# Patient Record
Sex: FEMALE | Race: WHITE | NOT HISPANIC OR LATINO | Employment: OTHER | URBAN - METROPOLITAN AREA
[De-identification: names, ages, dates, MRNs, and addresses within clinical notes are randomized per-mention and may not be internally consistent; named-entity substitution may affect disease eponyms.]

---

## 2017-01-04 ENCOUNTER — LAB (OUTPATIENT)
Dept: FAMILY MEDICINE CLINIC | Facility: CLINIC | Age: 57
End: 2017-01-04

## 2017-01-04 DIAGNOSIS — E83.52 HYPERCALCEMIA: ICD-10-CM

## 2017-01-04 DIAGNOSIS — E87.6 HYPOKALEMIA: ICD-10-CM

## 2017-01-04 DIAGNOSIS — N28.9 ABNORMAL KIDNEY FUNCTION: ICD-10-CM

## 2017-01-04 LAB
ANION GAP SERPL CALCULATED.3IONS-SCNC: 15.7 MMOL/L
BUN BLD-MCNC: 10 MG/DL (ref 6–20)
BUN/CREAT SERPL: 8.8 (ref 7–25)
CA-I BLD-MCNC: 5.2 MG/DL (ref 4.6–5.4)
CA-I SERPL ISE-MCNC: 1.29 MMOL/L (ref 1.1–1.35)
CALCIUM SPEC-SCNC: 10.6 MG/DL (ref 8.6–10.5)
CHLORIDE SERPL-SCNC: 92 MMOL/L (ref 98–107)
CO2 SERPL-SCNC: 34.3 MMOL/L (ref 22–29)
CREAT BLD-MCNC: 1.14 MG/DL (ref 0.57–1)
GFR SERPL CREATININE-BSD FRML MDRD: 49 ML/MIN/1.73
GLUCOSE BLD-MCNC: 122 MG/DL (ref 65–99)
POTASSIUM BLD-SCNC: 3.4 MMOL/L (ref 3.5–5.2)
PTH-INTACT SERPL-MCNC: 46.9 PG/ML (ref 15–65)
SODIUM BLD-SCNC: 142 MMOL/L (ref 136–145)
TSH SERPL DL<=0.05 MIU/L-ACNC: 3.33 MIU/ML (ref 0.27–4.2)

## 2017-01-04 PROCEDURE — 84443 ASSAY THYROID STIM HORMONE: CPT | Performed by: NURSE PRACTITIONER

## 2017-01-04 PROCEDURE — 82330 ASSAY OF CALCIUM: CPT | Performed by: NURSE PRACTITIONER

## 2017-01-04 PROCEDURE — 80048 BASIC METABOLIC PNL TOTAL CA: CPT | Performed by: NURSE PRACTITIONER

## 2017-01-04 PROCEDURE — 83970 ASSAY OF PARATHORMONE: CPT | Performed by: NURSE PRACTITIONER

## 2017-01-06 ENCOUNTER — TELEPHONE (OUTPATIENT)
Dept: FAMILY MEDICINE CLINIC | Facility: CLINIC | Age: 57
End: 2017-01-06

## 2017-01-06 DIAGNOSIS — R94.4 ABNORMAL RESULTS OF KIDNEY FUNCTION STUDIES: Primary | ICD-10-CM

## 2017-01-06 NOTE — TELEPHONE ENCOUNTER
----- Message from Dori Amado MA sent at 1/6/2017  9:30 AM EST -----  Contact: [phone]      ----- Message -----     From: Jessica Yen     Sent: 1/6/2017   9:01 AM       To: Dori Amado MA    PLEASE CALL PT WITH LAB RESULTS    Calcium elevated but further testing is negative for ionized calcium or parathyroid. We will monitor, no FU needed. BS slightly high 122 enc DM diet and regular exercise for BS control and weight loss. Kidney function is decreased again, recommend 24 hr urine collection to evaluate, suspect d/t dehydration from illness, increase H20 8 glasses day and RTC lab only recheck urine testing.

## 2017-01-12 ENCOUNTER — LAB (OUTPATIENT)
Dept: FAMILY MEDICINE CLINIC | Facility: CLINIC | Age: 57
End: 2017-01-12

## 2017-01-12 DIAGNOSIS — R94.4 NONSPECIFIC ABNORMAL RESULTS OF KIDNEY FUNCTION STUDY: Primary | ICD-10-CM

## 2017-01-12 DIAGNOSIS — R94.4 ABNORMAL RESULTS OF KIDNEY FUNCTION STUDIES: ICD-10-CM

## 2017-01-12 LAB
CREAT 24H UR-MCNC: 44.5 MG/DL
CREAT 24H UR-MRATE: 0.82 G/24 HR (ref 0.7–1.6)
CREAT CL 24H UR+SERPL-VRATE: 56 ML/MIN (ref 74.3–113.9)
CREAT SERPL-MCNC: 1.02 MG/DL (ref 0.57–1)
PROT 24H UR-MRATE: 74 MG/24HOURS (ref 0–150)
PROT UR-MCNC: 4 MG/DL

## 2017-01-17 ENCOUNTER — TELEPHONE (OUTPATIENT)
Dept: FAMILY MEDICINE CLINIC | Facility: CLINIC | Age: 57
End: 2017-01-17

## 2017-01-17 DIAGNOSIS — N28.9 FUNCTION KIDNEY DECREASED: Primary | ICD-10-CM

## 2017-01-17 NOTE — TELEPHONE ENCOUNTER
Kidney testing with urine shows some improvement but still decreased, need to FU with nephrology, referral made. Cont increase H20

## 2017-02-10 RX ORDER — TRIAMTERENE AND HYDROCHLOROTHIAZIDE 37.5; 25 MG/1; MG/1
TABLET ORAL
Qty: 30 TABLET | Refills: 4 | Status: SHIPPED | OUTPATIENT
Start: 2017-02-10 | End: 2017-04-17 | Stop reason: ALTCHOICE

## 2017-02-21 ENCOUNTER — TELEPHONE (OUTPATIENT)
Dept: FAMILY MEDICINE CLINIC | Facility: CLINIC | Age: 57
End: 2017-02-21

## 2017-02-21 NOTE — TELEPHONE ENCOUNTER
----- Message from JYOTI Disla sent at 2/21/2017 10:34 AM EST -----  Contact: TELE: [phone]   Did he recommend [facility] or [facility] or the hospital? He surely sends his patient for labs somewhere    ----- Message -----     From: Joel Vasquez MA     Sent: 2/21/2017   9:21 AM       To: JYOTI Disla        ----- Message -----     From: Nhung Greenwood     Sent: 2/21/2017   8:55 AM       To: ASPEN Archuleta DR THE NEPHROLOGIST  DOESN'T DO LABS AT THE OFFICE. WE NEED TO FIND A CLOSER PLACE TO HER THAT SHE CAN DO HER LABS.  WHAT SHOULD SHE DO?     Pt informed to call her nephrologist or insurance company to see if she can use [facility] or [facility]

## 2017-02-22 RX ORDER — LEVOTHYROXINE SODIUM 0.05 MG/1
TABLET ORAL
Qty: 90 TABLET | Refills: 2 | Status: SHIPPED | OUTPATIENT
Start: 2017-02-22 | End: 2017-11-21 | Stop reason: SDUPTHER

## 2017-04-06 RX ORDER — EZETIMIBE 10 MG/1
10 TABLET ORAL DAILY
Qty: 90 TABLET | Refills: 1 | Status: SHIPPED | OUTPATIENT
Start: 2017-04-06 | End: 2017-11-21 | Stop reason: SDUPTHER

## 2017-04-17 ENCOUNTER — OFFICE VISIT (OUTPATIENT)
Dept: FAMILY MEDICINE CLINIC | Facility: CLINIC | Age: 57
End: 2017-04-17

## 2017-04-17 VITALS
HEIGHT: 62 IN | TEMPERATURE: 98.5 F | OXYGEN SATURATION: 94 % | WEIGHT: 219 LBS | HEART RATE: 89 BPM | SYSTOLIC BLOOD PRESSURE: 120 MMHG | DIASTOLIC BLOOD PRESSURE: 88 MMHG | BODY MASS INDEX: 40.3 KG/M2

## 2017-04-17 DIAGNOSIS — E03.9 HYPOTHYROIDISM, UNSPECIFIED TYPE: ICD-10-CM

## 2017-04-17 DIAGNOSIS — R73.03 PREDIABETES: ICD-10-CM

## 2017-04-17 DIAGNOSIS — N28.9 KIDNEY INSUFFICIENCY: ICD-10-CM

## 2017-04-17 DIAGNOSIS — E66.01 MORBID OBESITY DUE TO EXCESS CALORIES (HCC): ICD-10-CM

## 2017-04-17 DIAGNOSIS — H81.01 MENIERE'S DISEASE, RIGHT: Primary | ICD-10-CM

## 2017-04-17 DIAGNOSIS — E83.52 HYPERCALCEMIA: ICD-10-CM

## 2017-04-17 DIAGNOSIS — R74.8 ELEVATED LIVER ENZYMES: ICD-10-CM

## 2017-04-17 DIAGNOSIS — L40.50 PSORIATIC ARTHRITIS (HCC): ICD-10-CM

## 2017-04-17 PROBLEM — I10 HYPERTENSION: Status: ACTIVE | Noted: 2017-04-17

## 2017-04-17 PROCEDURE — 99213 OFFICE O/P EST LOW 20 MIN: CPT | Performed by: NURSE PRACTITIONER

## 2017-04-17 RX ORDER — FUROSEMIDE 40 MG/1
40 TABLET ORAL DAILY
Qty: 90 TABLET | Refills: 1 | Status: SHIPPED | OUTPATIENT
Start: 2017-04-17 | End: 2021-10-27 | Stop reason: DRUGHIGH

## 2017-04-17 RX ORDER — POTASSIUM CHLORIDE 20 MEQ/1
20 TABLET, EXTENDED RELEASE ORAL DAILY
Qty: 90 TABLET | Refills: 1 | Status: SHIPPED | OUTPATIENT
Start: 2017-04-17 | End: 2017-08-25 | Stop reason: SDUPTHER

## 2017-04-17 RX ORDER — FUROSEMIDE 40 MG/1
20 TABLET ORAL EVERY OTHER DAY
COMMUNITY
End: 2017-04-17 | Stop reason: SDUPTHER

## 2017-04-17 NOTE — PROGRESS NOTES
Subjective   Laura Cerda is a 57 y.o. female.     History of Present Illness   Here to FU on kidney insufficiency, Saw Dr William payne on lasix 20 mg every other day with potassium, thinks HCTZ exacerbating kidney function but also monitoring hypercalcemia, checked here with PTH, TSH, and calcium all normal, also monitoring elevated liver enzymes, concern about stopping HCTZ causing exacerbation in meniere's, now on lasix 20 mg 1 PO QOD but has had problem getting at pharmacy needs 90 day supply request refill, Had FU with [facility] now has hearing aid R ear since 12/16 with menierfrank on mazide sees Dr Michelle, seems stable  With fibromyalgia and psoriatic arthritis sees Dr Diane Be, thinks already had hep c screening prior to starting humira  Last A1C 6.6 working on diet and exercise    The following portions of the patient's history were reviewed and updated as appropriate: allergies, current medications, past family history, past medical history, past social history, past surgical history and problem list.    Review of Systems   Constitutional: Negative for fever.   HENT: Positive for ear pain and hearing loss. Negative for ear discharge and facial swelling.    Respiratory: Negative for cough, shortness of breath and wheezing.    Cardiovascular: Negative for chest pain, palpitations and leg swelling.   Genitourinary: Negative for dysuria and flank pain.   Musculoskeletal: Positive for arthralgias. Negative for back pain.   Neurological: Negative for dizziness and headaches.   All other systems reviewed and are negative.      Objective   Physical Exam   Constitutional: She is oriented to person, place, and time. She appears well-developed and well-nourished.   HENT:   Head: Normocephalic and atraumatic.   Eyes: Conjunctivae and EOM are normal. Pupils are equal, round, and reactive to light.   Cardiovascular: Normal rate, regular rhythm and normal heart sounds.    Pulmonary/Chest: Effort normal and breath  sounds normal.   Musculoskeletal: Normal range of motion.   Neurological: She is alert and oriented to person, place, and time.   Skin: Skin is warm and dry.   Psychiatric: She has a normal mood and affect. Her behavior is normal. Judgment and thought content normal.   Vitals reviewed.      Assessment/Plan   Laura was seen today for follow-up.    Diagnoses and all orders for this visit:    Meniere's disease, right    Morbid obesity due to excess calories    Hypothyroidism, unspecified type    Psoriatic arthritis    Kidney insufficiency    Hypercalcemia    Elevated liver enzymes    Other orders  -     furosemide (LASIX) 40 MG tablet; Take 1 tablet by mouth Daily.  -     potassium chloride (KLOR-CON) 20 MEQ CR tablet; Take 1 tablet by mouth Daily.    will get labs from Dr Thomas [facility] to review, cont FU with him, cont FU with rheum, Dr Michelle and [facility], [facility] healthy diet and regular exercise for wt loss, cont levothyroxine 50 mcg daily, will recheck labs in 3 mo elevated A1C 6.6, refill lasix and potassium

## 2017-04-17 NOTE — PATIENT INSTRUCTIONS
will get labs from Dr Thomas [facility] to review, cont FU with him, cont FU with rheum, Dr Michelle and [facility], [facility] healthy diet and regular exercise for wt loss, cont levothyroxine 50 mcg daily, will recheck labs in 3 mo elevated A1C 6.6, refill lasix and potassium

## 2017-08-25 ENCOUNTER — TELEPHONE (OUTPATIENT)
Dept: FAMILY MEDICINE CLINIC | Facility: CLINIC | Age: 57
End: 2017-08-25

## 2017-08-25 DIAGNOSIS — E87.6 HYPOKALEMIA: Primary | ICD-10-CM

## 2017-08-25 RX ORDER — POTASSIUM CHLORIDE 20 MEQ/1
TABLET, EXTENDED RELEASE ORAL
Qty: 180 TABLET | Refills: 1 | Status: SHIPPED | OUTPATIENT
Start: 2017-08-25 | End: 2017-08-25 | Stop reason: DRUGHIGH

## 2017-08-25 RX ORDER — POTASSIUM CHLORIDE 20 MEQ/1
20 TABLET, EXTENDED RELEASE ORAL 3 TIMES DAILY
Qty: 90 TABLET | Refills: 1
Start: 2017-08-25 | End: 2018-03-13

## 2017-08-25 NOTE — TELEPHONE ENCOUNTER
Need to call as soon as possible 2 tablets 3 times a day and had magnesium 1 tablet twice a day and recheck labs on Monday

## 2017-08-28 ENCOUNTER — LAB (OUTPATIENT)
Dept: FAMILY MEDICINE CLINIC | Facility: CLINIC | Age: 57
End: 2017-08-28

## 2017-08-28 DIAGNOSIS — E87.6 HYPOKALEMIA: ICD-10-CM

## 2017-08-28 LAB
ANION GAP SERPL CALCULATED.3IONS-SCNC: 14.8 MMOL/L
BUN BLD-MCNC: 18 MG/DL (ref 6–20)
BUN/CREAT SERPL: 22.8 (ref 7–25)
CALCIUM SPEC-SCNC: 10.2 MG/DL (ref 8.6–10.5)
CHLORIDE SERPL-SCNC: 97 MMOL/L (ref 98–107)
CO2 SERPL-SCNC: 28.2 MMOL/L (ref 22–29)
CREAT BLD-MCNC: 0.79 MG/DL (ref 0.57–1)
GFR SERPL CREATININE-BSD FRML MDRD: 75 ML/MIN/1.73
GLUCOSE BLD-MCNC: 106 MG/DL (ref 65–99)
MAGNESIUM SERPL-MCNC: 2.2 MG/DL (ref 1.6–2.6)
POTASSIUM BLD-SCNC: 4 MMOL/L (ref 3.5–5.2)
SODIUM BLD-SCNC: 140 MMOL/L (ref 136–145)

## 2017-08-28 PROCEDURE — 80048 BASIC METABOLIC PNL TOTAL CA: CPT | Performed by: INTERNAL MEDICINE

## 2017-08-28 PROCEDURE — 83735 ASSAY OF MAGNESIUM: CPT | Performed by: INTERNAL MEDICINE

## 2017-08-28 PROCEDURE — 36415 COLL VENOUS BLD VENIPUNCTURE: CPT | Performed by: INTERNAL MEDICINE

## 2017-08-30 DIAGNOSIS — E87.6 HYPOKALEMIA: Primary | ICD-10-CM

## 2017-08-30 RX ORDER — SPIRONOLACTONE 50 MG/1
25 TABLET, FILM COATED ORAL DAILY
Qty: 30 TABLET | Refills: 3 | Status: SHIPPED | OUTPATIENT
Start: 2017-08-30 | End: 2017-08-30

## 2017-08-30 NOTE — TELEPHONE ENCOUNTER
Called and cancelled spirolactone sent bymistake and informed pt of labs .lab sent to [place] office

## 2017-11-22 RX ORDER — LEVOTHYROXINE SODIUM 0.05 MG/1
TABLET ORAL
Qty: 90 TABLET | Refills: 1 | Status: SHIPPED | OUTPATIENT
Start: 2017-11-22 | End: 2018-05-18 | Stop reason: SDUPTHER

## 2017-11-22 RX ORDER — EZETIMIBE 10 MG/1
TABLET ORAL
Qty: 90 TABLET | Refills: 0 | Status: SHIPPED | OUTPATIENT
Start: 2017-11-22 | End: 2018-04-12 | Stop reason: SDUPTHER

## 2018-01-10 ENCOUNTER — OFFICE VISIT (OUTPATIENT)
Dept: FAMILY MEDICINE CLINIC | Facility: CLINIC | Age: 58
End: 2018-01-10

## 2018-01-10 VITALS
WEIGHT: 215.8 LBS | BODY MASS INDEX: 39.71 KG/M2 | TEMPERATURE: 98.3 F | RESPIRATION RATE: 16 BRPM | HEART RATE: 119 BPM | DIASTOLIC BLOOD PRESSURE: 90 MMHG | HEIGHT: 62 IN | SYSTOLIC BLOOD PRESSURE: 120 MMHG | OXYGEN SATURATION: 98 %

## 2018-01-10 DIAGNOSIS — J01.00 ACUTE MAXILLARY SINUSITIS, RECURRENCE NOT SPECIFIED: Primary | ICD-10-CM

## 2018-01-10 DIAGNOSIS — J02.9 ACUTE PHARYNGITIS, UNSPECIFIED ETIOLOGY: ICD-10-CM

## 2018-01-10 DIAGNOSIS — J06.9 ACUTE URI: ICD-10-CM

## 2018-01-10 DIAGNOSIS — R05.9 COUGH: ICD-10-CM

## 2018-01-10 DIAGNOSIS — J30.2 CHRONIC SEASONAL ALLERGIC RHINITIS, UNSPECIFIED TRIGGER: ICD-10-CM

## 2018-01-10 PROCEDURE — 99213 OFFICE O/P EST LOW 20 MIN: CPT | Performed by: NURSE PRACTITIONER

## 2018-01-10 RX ORDER — CLINDAMYCIN HYDROCHLORIDE 300 MG/1
300 CAPSULE ORAL 3 TIMES DAILY
Qty: 30 CAPSULE | Refills: 0 | Status: SHIPPED | OUTPATIENT
Start: 2018-01-10 | End: 2018-04-30

## 2018-01-10 RX ORDER — TRIAMTERENE AND HYDROCHLOROTHIAZIDE 37.5; 25 MG/1; MG/1
1 CAPSULE ORAL EVERY OTHER DAY
COMMUNITY
End: 2018-03-23 | Stop reason: ALTCHOICE

## 2018-01-10 RX ORDER — BENZONATATE 100 MG/1
100 CAPSULE ORAL 3 TIMES DAILY PRN
Qty: 30 CAPSULE | Refills: 0 | Status: SHIPPED | OUTPATIENT
Start: 2018-01-10 | End: 2018-04-30

## 2018-01-10 RX ORDER — METHOCARBAMOL 750 MG/1
750 TABLET, FILM COATED ORAL
Status: ON HOLD | COMMUNITY
End: 2018-11-30

## 2018-01-10 RX ORDER — MOMETASONE FUROATE 50 UG/1
2 SPRAY, METERED NASAL DAILY
COMMUNITY
End: 2022-10-18

## 2018-01-10 RX ORDER — MELATONIN
1000 DAILY
Status: ON HOLD | COMMUNITY
End: 2018-08-24

## 2018-01-10 NOTE — PATIENT INSTRUCTIONS
Start cleocin 300mg 3x day for 10 days. Prev tolerated.  Tessalon perles 100mg up to 3x day as needed for cough.  May try plain mucinex OTC as needed for cough.  Cont allegra and nasonex daily.   May use albuterol inhaler as needed as prescribed.   If symptoms persist 5-7 days call office.   Avoid cold medicines, monitor BP at home.   Increase fluid intake, get plenty of rest.   Patient agrees with plan of care and understands instructions. Call if worsening symptoms or any problems or concerns.

## 2018-01-10 NOTE — PROGRESS NOTES
Subjective   Laura Cerda is a 57 y.o. female.     History of Present Illness   C/o sneezing, cough, congestion, sinus pressure, nasal drainage, sore throat, ear pain, chills and sweats. She states symptoms started about 7 days ago, she tried mucinex sius and sudafed OTC, he denies fever,she states she had low grade temp at home, she did get the flu shot, she denies vomiting or diarrhea. She has watery eyes, she has sinus pressure. She states her pressure has become worse. She has a productive cough,thick, green and yellow in color. She has not used inhaler, she has a hx of asthma, she has seasonal allergies. She has hx of psoriatic arthritis. She takes allegra. She denies wheezing.     The following portions of the patient's history were reviewed and updated as appropriate: allergies, current medications, past family history, past medical history, past social history, past surgical history and problem list.    Review of Systems   Constitutional: Positive for chills, diaphoresis and fever.   HENT: Positive for congestion, ear pain, postnasal drip, rhinorrhea, sinus pressure, sneezing and sore throat.    Eyes: Positive for discharge (watery). Negative for photophobia, pain and visual disturbance.   Respiratory: Positive for cough and wheezing. Negative for chest tightness and shortness of breath.    Cardiovascular: Negative for chest pain.   Musculoskeletal: Positive for myalgias. Negative for arthralgias.   Skin: Negative for pallor.   Allergic/Immunologic: Positive for environmental allergies.   Neurological: Negative for dizziness, light-headedness and headaches.   All other systems reviewed and are negative.      Objective   Physical Exam   Constitutional: She is oriented to person, place, and time. She appears well-developed and well-nourished.   HENT:   Head: Normocephalic.   Right Ear: Tympanic membrane and ear canal normal.   Left Ear: Ear canal normal. Tympanic membrane is erythematous.   Nose: Mucosal edema  present. Right sinus exhibits maxillary sinus tenderness. Left sinus exhibits maxillary sinus tenderness.   Mouth/Throat: Uvula is midline and mucous membranes are normal. Posterior oropharyngeal erythema present.   Eyes: Pupils are equal, round, and reactive to light.   Neck: Neck supple.   Cardiovascular: Normal rate, regular rhythm and normal heart sounds.    Pulmonary/Chest: Effort normal and breath sounds normal. No respiratory distress. She has no decreased breath sounds. She has no wheezes. She has no rhonchi. She has no rales.   Musculoskeletal: Normal range of motion.   Neurological: She is alert and oriented to person, place, and time.   Skin: Skin is warm and dry.   Psychiatric: She has a normal mood and affect. Her behavior is normal. Judgment and thought content normal.   Nursing note and vitals reviewed.      Assessment/Plan   Laura was seen today for uri.    Diagnoses and all orders for this visit:    Acute maxillary sinusitis, recurrence not specified    Acute URI    Cough    Chronic seasonal allergic rhinitis, unspecified trigger    Acute pharyngitis, unspecified etiology    Other orders  -     clindamycin (CLEOCIN) 300 MG capsule; Take 1 capsule by mouth 3 (Three) Times a Day. x10d  -     benzonatate (TESSALON PERLES) 100 MG capsule; Take 1 capsule by mouth 3 (Three) Times a Day As Needed for Cough.      Start cleocin 300mg 3x day for 10 days. Prev tolerated.  Tessalon perles 100mg up to 3x day as needed for cough.  May try plain mucinex OTC as needed for cough.  Cont allegra and nasonex daily.   May use albuterol inhaler as needed as prescribed.   If symptoms persist 5-7 days call office.   Avoid cold medicines, monitor BP at home.   Increase fluid intake, get plenty of rest.   Patient agrees with plan of care and understands instructions. Call if worsening symptoms or any problems or concerns.

## 2018-01-30 ENCOUNTER — OFFICE VISIT (OUTPATIENT)
Dept: OBSTETRICS AND GYNECOLOGY | Age: 58
End: 2018-01-30

## 2018-01-30 VITALS
BODY MASS INDEX: 40.48 KG/M2 | HEIGHT: 62 IN | DIASTOLIC BLOOD PRESSURE: 80 MMHG | WEIGHT: 220 LBS | SYSTOLIC BLOOD PRESSURE: 120 MMHG

## 2018-01-30 DIAGNOSIS — Z01.419 ENCOUNTER FOR GYNECOLOGICAL EXAMINATION: Primary | ICD-10-CM

## 2018-01-30 DIAGNOSIS — N94.10 DYSPAREUNIA, FEMALE: ICD-10-CM

## 2018-01-30 PROCEDURE — 99396 PREV VISIT EST AGE 40-64: CPT | Performed by: NURSE PRACTITIONER

## 2018-01-30 RX ORDER — ESTRADIOL 0.1 MG/G
1 CREAM VAGINAL 2 TIMES WEEKLY
Qty: 42.5 G | Refills: 12 | Status: SHIPPED | OUTPATIENT
Start: 2018-02-01 | End: 2018-05-04 | Stop reason: ALTCHOICE

## 2018-01-30 NOTE — PROGRESS NOTES
Subjective       History of Present Illness    Chief Complaint   Patient presents with   • Gynecologic Exam     Pt had MG in December.       Laura Cerda is a 58 y.o. female who presents for annual exam.  Her only problem is painful intercourse.  This has been an ongoing issue with menopause.  She has tried replense but it doesn't help.  No other problems.  No vaginal bleeding, bowel or bladder problems.    OB History    Para Term  AB Living   2 2 2   2   SAB TAB Ectopic Multiple Live Births       2      # Outcome Date GA Lbr Bernard/2nd Weight Sex Delivery Anes PTL Lv   2 Term  40w0d  3175 g (7 lb) M CS-LTranv Spinal N VASHTI   1 Term  40w0d  3629 g (8 lb) F CS-LTranv Spinal N VASHTI      Complications: Breech delivery          The following portions of the patient's history were reviewed and updated as appropriate: allergies, current medications, past family history, past medical history, past social history, past surgical history and problem list.      Current contraception: post menopausal status  History of abnormal Pap smear: no  Received Gardasil immunization: no  Perform regular self breast exam: yes - occasionallyt  Family history of uterine or ovarian cancer: no  Family History of colon cancer: no  Family history of breast cancer: yes - mother at 72    Mammogram: up to date.  Colonoscopy: up to date. 3 years ago, polyp removed.  She is due for another one soon and will schedule.  DEXA: never, suggested for next year  Last Pap:2016 neg/neg    Smoking status: Never Smoker                                                              Smokeless status: Never Used                        Exercise: moderately active  Calcium/Vitamin D: uses supplements    The following portions of the patient's history were reviewed and updated as appropriate: allergies, current medications, past family history, past medical history, past social history, past surgical history and problem list.    Review of Systems  "  Constitutional: Negative.    HENT: Negative.    Eyes: Negative.    Respiratory: Negative.    Cardiovascular: Negative.    Gastrointestinal: Negative.    Endocrine: Negative.    Genitourinary: Positive for dyspareunia.   Musculoskeletal: Negative.    Skin: Negative.    Allergic/Immunologic: Negative.    Neurological: Negative.    Hematological: Negative.    Psychiatric/Behavioral: Negative.          Objective   Physical Exam   Constitutional: She is oriented to person, place, and time. She appears well-developed and well-nourished.   Neck: No thyroid mass present.   Cardiovascular: Normal rate, regular rhythm and normal heart sounds.    Pulmonary/Chest: Effort normal and breath sounds normal. Right breast exhibits no mass, no nipple discharge, no skin change and no tenderness. Left breast exhibits no mass, no nipple discharge, no skin change and no tenderness.   Abdominal: Soft. There is no tenderness.   Genitourinary: Vagina normal and uterus normal. There is no rash or lesion on the right labia. There is no rash or lesion on the left labia. Cervix exhibits no motion tenderness, no discharge and no friability. Right adnexum displays no mass and no tenderness. Left adnexum displays no mass and no tenderness.   Neurological: She is alert and oriented to person, place, and time.   Skin: Skin is warm and dry.   Psychiatric: She has a normal mood and affect. Her behavior is normal.   Vitals reviewed.      /80  Ht 157.5 cm (62\")  Wt 99.8 kg (220 lb)  BMI 40.24 kg/m2    Assessment/Plan   Laura was seen today for gynecologic exam.    Diagnoses and all orders for this visit:    Encounter for gynecological examination    Dyspareunia, female    Other orders  -     estradiol (ESTRACE VAGINAL) 0.1 MG/GM vaginal cream; Insert 1 g into the vagina 2 (Two) Times a Week.        Breast self exam technique reviewed and patient encouraged to perform self-exam monthly.  Discussed healthy lifestyle modifications.  Pap smear up " to date  Recommended 30 minutes of aerobic exercise five times per week.  Discussed calcium needs to prevent osteoporosis  Discussed estrace vaginal cream.  R/B/A reviewed.  Patient would like to try it.  Plan BMD next year.

## 2018-02-26 RX ORDER — FEXOFENADINE HCL 180 MG/1
TABLET ORAL
Qty: 90 TABLET | Refills: 2 | Status: SHIPPED | OUTPATIENT
Start: 2018-02-26

## 2018-03-13 DIAGNOSIS — E87.6 HYPOKALEMIA: Primary | ICD-10-CM

## 2018-03-13 RX ORDER — POTASSIUM CHLORIDE 20 MEQ/1
20 TABLET, EXTENDED RELEASE ORAL 3 TIMES DAILY
Qty: 90 TABLET | Refills: 1
Start: 2018-03-13 | End: 2018-03-13

## 2018-03-13 RX ORDER — POTASSIUM CHLORIDE 20 MEQ/1
TABLET, EXTENDED RELEASE ORAL
Qty: 90 TABLET | Refills: 3 | Status: SHIPPED | OUTPATIENT
Start: 2018-03-13 | End: 2018-03-13 | Stop reason: SDUPTHER

## 2018-03-13 RX ORDER — POTASSIUM CHLORIDE 20 MEQ/1
TABLET, EXTENDED RELEASE ORAL
Qty: 120 TABLET | Refills: 3 | Status: SHIPPED | OUTPATIENT
Start: 2018-03-13 | End: 2018-03-13

## 2018-03-13 RX ORDER — PANTOPRAZOLE SODIUM 40 MG/1
TABLET, DELAYED RELEASE ORAL
Qty: 90 TABLET | Refills: 2 | Status: SHIPPED | OUTPATIENT
Start: 2018-03-13 | End: 2018-03-23 | Stop reason: ALTCHOICE

## 2018-03-13 RX ORDER — POTASSIUM CHLORIDE 20 MEQ/1
TABLET, EXTENDED RELEASE ORAL
Qty: 90 TABLET | Refills: 3 | Status: SHIPPED | OUTPATIENT
Start: 2018-03-13 | End: 2019-12-23

## 2018-03-20 ENCOUNTER — LAB (OUTPATIENT)
Dept: FAMILY MEDICINE CLINIC | Facility: CLINIC | Age: 58
End: 2018-03-20

## 2018-03-20 ENCOUNTER — TELEPHONE (OUTPATIENT)
Dept: FAMILY MEDICINE CLINIC | Facility: CLINIC | Age: 58
End: 2018-03-20

## 2018-03-20 DIAGNOSIS — E87.6 HYPOKALEMIA: ICD-10-CM

## 2018-03-20 LAB
ANION GAP SERPL CALCULATED.3IONS-SCNC: 12.3 MMOL/L
BUN BLD-MCNC: 9 MG/DL (ref 6–20)
BUN/CREAT SERPL: 10.3 (ref 7–25)
CALCIUM SPEC-SCNC: 9.9 MG/DL (ref 8.6–10.5)
CHLORIDE SERPL-SCNC: 100 MMOL/L (ref 98–107)
CO2 SERPL-SCNC: 30.7 MMOL/L (ref 22–29)
CREAT BLD-MCNC: 0.87 MG/DL (ref 0.57–1)
GFR SERPL CREATININE-BSD FRML MDRD: 67 ML/MIN/1.73
GLUCOSE BLD-MCNC: 102 MG/DL (ref 65–99)
MAGNESIUM SERPL-MCNC: 2.5 MG/DL (ref 1.6–2.6)
POTASSIUM BLD-SCNC: 4.3 MMOL/L (ref 3.5–5.2)
SODIUM BLD-SCNC: 143 MMOL/L (ref 136–145)

## 2018-03-20 PROCEDURE — 80048 BASIC METABOLIC PNL TOTAL CA: CPT | Performed by: INTERNAL MEDICINE

## 2018-03-20 PROCEDURE — 36415 COLL VENOUS BLD VENIPUNCTURE: CPT | Performed by: INTERNAL MEDICINE

## 2018-03-20 PROCEDURE — 83735 ASSAY OF MAGNESIUM: CPT | Performed by: INTERNAL MEDICINE

## 2018-03-23 ENCOUNTER — OFFICE VISIT (OUTPATIENT)
Dept: FAMILY MEDICINE CLINIC | Facility: CLINIC | Age: 58
End: 2018-03-23

## 2018-03-23 VITALS
DIASTOLIC BLOOD PRESSURE: 88 MMHG | OXYGEN SATURATION: 100 % | HEART RATE: 98 BPM | BODY MASS INDEX: 41.07 KG/M2 | SYSTOLIC BLOOD PRESSURE: 134 MMHG | TEMPERATURE: 97.9 F | HEIGHT: 62 IN | WEIGHT: 223.2 LBS

## 2018-03-23 DIAGNOSIS — R79.89 ELEVATED PTHRP LEVEL: Primary | ICD-10-CM

## 2018-03-23 DIAGNOSIS — E03.9 HYPOTHYROIDISM, UNSPECIFIED TYPE: ICD-10-CM

## 2018-03-23 LAB
T-UPTAKE NFR SERPL: 1.09 TBI (ref 0.8–1.3)
T4 SERPL-MCNC: 6.13 MCG/DL (ref 4.5–11.7)
TSH SERPL DL<=0.05 MIU/L-ACNC: 2.24 MIU/ML (ref 0.27–4.2)

## 2018-03-23 PROCEDURE — 99213 OFFICE O/P EST LOW 20 MIN: CPT | Performed by: INTERNAL MEDICINE

## 2018-03-23 PROCEDURE — 84479 ASSAY OF THYROID (T3 OR T4): CPT | Performed by: INTERNAL MEDICINE

## 2018-03-23 PROCEDURE — 84443 ASSAY THYROID STIM HORMONE: CPT | Performed by: INTERNAL MEDICINE

## 2018-03-23 PROCEDURE — 84436 ASSAY OF TOTAL THYROXINE: CPT | Performed by: INTERNAL MEDICINE

## 2018-03-23 PROCEDURE — 36415 COLL VENOUS BLD VENIPUNCTURE: CPT | Performed by: INTERNAL MEDICINE

## 2018-03-23 RX ORDER — RANITIDINE 150 MG/1
150 TABLET ORAL 2 TIMES DAILY
COMMUNITY
End: 2018-12-05

## 2018-03-23 NOTE — PROGRESS NOTES
Subjective   Laura Cerda is a 58 y.o. female.     History of Present Illness   Patient was seen for an elevated PTH.  Her nephrologists checked her PTH and it was 83.  She was referred to endocrine for hyperparathyroidism.  Her thyroid levels also recheck today.    Much of this encounter note is an electronic transcription/translation of spoken language to printed text.  The electronic translation of spoken language may permit erroneous, or at times, nonsensical words or phrases to be inadvertently transcribed.  Although I have reviewed the note for such errors, some may still exist.  The following portions of the patient's history were reviewed and updated as appropriate: allergies, current medications, past family history, past medical history, past social history, past surgical history and problem list.    Review of Systems   Constitutional: Negative for fatigue and fever.   HENT: Positive for congestion. Negative for trouble swallowing.    Eyes: Negative for discharge and visual disturbance.   Respiratory: Negative for choking and shortness of breath.    Cardiovascular: Negative for chest pain and palpitations.   Gastrointestinal: Negative for abdominal pain and blood in stool.   Endocrine: Negative.    Genitourinary: Negative for genital sores and hematuria.   Musculoskeletal: Negative for gait problem and joint swelling.   Skin: Negative for color change, pallor, rash and wound.   Allergic/Immunologic: Positive for environmental allergies. Negative for immunocompromised state.   Neurological: Negative for facial asymmetry and speech difficulty.   Psychiatric/Behavioral: Negative for hallucinations and suicidal ideas.       Objective   Physical Exam   Constitutional: She is oriented to person, place, and time. She appears well-developed and well-nourished.   HENT:   Head: Normocephalic.   Eyes: Conjunctivae are normal. Pupils are equal, round, and reactive to light.   Neck: Normal range of motion. Neck supple.    Cardiovascular: Normal rate, regular rhythm and normal heart sounds.    Pulmonary/Chest: Effort normal and breath sounds normal.   Abdominal: Soft. Bowel sounds are normal.   Musculoskeletal: Normal range of motion.   Neurological: She is alert and oriented to person, place, and time.   Skin: Skin is warm and dry.   Psychiatric: She has a normal mood and affect. Her behavior is normal. Judgment and thought content normal.   Nursing note and vitals reviewed.      Assessment/Plan   Problems Addressed this Visit        Endocrine    Hypothyroid    Relevant Orders    Thyroid Panel With TSH       Other    Elevated PTHrP level - Primary    Relevant Orders    Ambulatory Referral to Endocrinology      Other Visit Diagnoses    None.

## 2018-03-27 ENCOUNTER — TELEPHONE (OUTPATIENT)
Dept: FAMILY MEDICINE CLINIC | Facility: CLINIC | Age: 58
End: 2018-03-27

## 2018-04-12 RX ORDER — EZETIMIBE 10 MG/1
TABLET ORAL
Qty: 90 TABLET | Refills: 0 | Status: SHIPPED | OUTPATIENT
Start: 2018-04-12 | End: 2018-08-07 | Stop reason: SDUPTHER

## 2018-04-30 ENCOUNTER — OFFICE VISIT (OUTPATIENT)
Dept: FAMILY MEDICINE CLINIC | Facility: CLINIC | Age: 58
End: 2018-04-30

## 2018-04-30 VITALS
TEMPERATURE: 98.1 F | DIASTOLIC BLOOD PRESSURE: 78 MMHG | WEIGHT: 223.4 LBS | HEIGHT: 62 IN | RESPIRATION RATE: 16 BRPM | OXYGEN SATURATION: 98 % | HEART RATE: 99 BPM | BODY MASS INDEX: 41.11 KG/M2 | SYSTOLIC BLOOD PRESSURE: 100 MMHG

## 2018-04-30 DIAGNOSIS — R30.0 DYSURIA: Primary | ICD-10-CM

## 2018-04-30 DIAGNOSIS — M54.50 LOW BACK PAIN WITHOUT SCIATICA, UNSPECIFIED BACK PAIN LATERALITY, UNSPECIFIED CHRONICITY: ICD-10-CM

## 2018-04-30 LAB
BACTERIA UR QL AUTO: NORMAL /HPF
BILIRUB UR QL STRIP: NEGATIVE
CLARITY UR: CLEAR
COLOR UR: YELLOW
GLUCOSE UR STRIP-MCNC: NEGATIVE MG/DL
HGB UR QL STRIP.AUTO: NEGATIVE
KETONES UR QL STRIP: NEGATIVE
LEUKOCYTE ESTERASE UR QL STRIP.AUTO: NEGATIVE
NITRITE UR QL STRIP: NEGATIVE
PH UR STRIP.AUTO: 6 [PH] (ref 4.6–8)
PROT UR QL STRIP: NEGATIVE
RBC # UR: NORMAL /HPF
REF LAB TEST METHOD: NORMAL
SP GR UR STRIP: 1.01 (ref 1–1.03)
SQUAMOUS #/AREA URNS HPF: NORMAL /HPF
UROBILINOGEN UR QL STRIP: NORMAL
WBC UR QL AUTO: NORMAL /HPF

## 2018-04-30 PROCEDURE — 99213 OFFICE O/P EST LOW 20 MIN: CPT | Performed by: NURSE PRACTITIONER

## 2018-04-30 PROCEDURE — 81001 URINALYSIS AUTO W/SCOPE: CPT | Performed by: NURSE PRACTITIONER

## 2018-04-30 PROCEDURE — 87086 URINE CULTURE/COLONY COUNT: CPT | Performed by: NURSE PRACTITIONER

## 2018-04-30 RX ORDER — CIPROFLOXACIN 250 MG/1
250 TABLET, FILM COATED ORAL EVERY 12 HOURS SCHEDULED
Qty: 6 TABLET | Refills: 0 | Status: ON HOLD | OUTPATIENT
Start: 2018-04-30 | End: 2018-11-30

## 2018-04-30 NOTE — PROGRESS NOTES
Subjective   Laura Cerda is a 58 y.o. female.     History of Present Illness   C/o low back pain, dysuria, denies urinary frequency, she has fatigue and HA, she denies fever, she states symptoms started about 1 week ago, she sees endocrine per her nephrologist for elevated PTH, currently on lasix 20mg. She has tried cranberry juice, increased water. She denies vag bleeding or d/c. She does have hx of low back pain, has had massage for back pain but did not help, she has had some pelvic pain, she sees rheumatology.     The following portions of the patient's history were reviewed and updated as appropriate: allergies, current medications, past family history, past medical history, past social history, past surgical history and problem list.    Review of Systems   Constitutional: Negative for chills, diaphoresis and fever.   Respiratory: Negative for cough and shortness of breath.    Cardiovascular: Negative for chest pain.   Genitourinary: Positive for dysuria and frequency. Negative for decreased urine volume, flank pain, menstrual problem, pelvic pain and vaginal bleeding.   Musculoskeletal: Positive for back pain and myalgias. Negative for arthralgias.   Skin: Negative for pallor.   Neurological: Negative for dizziness, light-headedness and headaches.   All other systems reviewed and are negative.      Objective   Physical Exam   Constitutional: She is oriented to person, place, and time. She appears well-developed and well-nourished.   HENT:   Head: Normocephalic.   Eyes: Pupils are equal, round, and reactive to light.   Neck: Normal range of motion.   Cardiovascular: Normal rate, regular rhythm and normal heart sounds.    Pulmonary/Chest: Effort normal and breath sounds normal.   Abdominal: There is no CVA tenderness.   Musculoskeletal: Normal range of motion.   Lymphadenopathy:     She has no cervical adenopathy.   Neurological: She is alert and oriented to person, place, and time.   Skin: Skin is warm and dry.    Psychiatric: She has a normal mood and affect. Her behavior is normal.   Nursing note and vitals reviewed.        Assessment/Plan   Laura was seen today for urinary tract infection.    Diagnoses and all orders for this visit:    Dysuria  -     Urinalysis With Microscopic - Urine, Clean Catch  -     Urinalysis - Urine, Clean Catch  -     Urinalysis, Microscopic Only - Urine, Clean Catch  -     Urine Culture - Urine, Urine, Clean Catch    Low back pain without sciatica, unspecified back pain laterality, unspecified chronicity  -     Urine Culture - Urine, Urine, Clean Catch    Other orders  -     ciprofloxacin (CIPRO) 250 MG tablet; Take 1 tablet by mouth Every 12 (Twelve) Hours.    Start cipro 250mg 2x day for 3 days.  UA today, sent for culture will call with results.   Drink plenty of H2O, wipe front to back after urination.   Avoid bladder irritants- coffee, caffeine, carbonation.  If symptoms persist call office, can consider PT for back pain if needed.   Increase fluid intake, get plenty of rest.   Patient agrees with plan of care and understands instructions. Call if worsening symptoms or any problems or concerns.

## 2018-04-30 NOTE — PATIENT INSTRUCTIONS
Start cipro 250mg 2x day for 3 days.  UA today, sent for culture will call with results.   Drink plenty of H2O, wipe front to back after urination.   Avoid bladder irritants- coffee, caffeine, carbonation.  If symptoms persist call office, can consider PT for back pain if needed.   Increase fluid intake, get plenty of rest.   Patient agrees with plan of care and understands instructions. Call if worsening symptoms or any problems or concerns.

## 2018-05-02 ENCOUNTER — TELEPHONE (OUTPATIENT)
Dept: FAMILY MEDICINE CLINIC | Facility: CLINIC | Age: 58
End: 2018-05-02

## 2018-05-02 LAB — BACTERIA SPEC AEROBE CULT: NO GROWTH

## 2018-05-02 NOTE — TELEPHONE ENCOUNTER
----- Message from JYOTI Calixto sent at 5/2/2018  8:16 AM EDT -----  Please call patient with results. Urine culture shows no growth, we do not need to extend the abx, she should f/u if back pain or symptoms persist.    Pt informed of lab results

## 2018-05-04 ENCOUNTER — OFFICE VISIT (OUTPATIENT)
Dept: ENDOCRINOLOGY | Age: 58
End: 2018-05-04

## 2018-05-04 VITALS
BODY MASS INDEX: 41.11 KG/M2 | SYSTOLIC BLOOD PRESSURE: 128 MMHG | HEIGHT: 62 IN | WEIGHT: 223.4 LBS | DIASTOLIC BLOOD PRESSURE: 88 MMHG | RESPIRATION RATE: 16 BRPM

## 2018-05-04 DIAGNOSIS — R53.82 CHRONIC FATIGUE: ICD-10-CM

## 2018-05-04 DIAGNOSIS — E06.3 HYPOTHYROIDISM DUE TO HASHIMOTO'S THYROIDITIS: ICD-10-CM

## 2018-05-04 DIAGNOSIS — E03.8 HYPOTHYROIDISM DUE TO HASHIMOTO'S THYROIDITIS: ICD-10-CM

## 2018-05-04 DIAGNOSIS — I10 ESSENTIAL HYPERTENSION: ICD-10-CM

## 2018-05-04 DIAGNOSIS — M79.7 FIBROMYALGIA: ICD-10-CM

## 2018-05-04 DIAGNOSIS — R73.9 HYPERGLYCEMIA: ICD-10-CM

## 2018-05-04 DIAGNOSIS — E21.0 PRIMARY HYPERPARATHYROIDISM (HCC): ICD-10-CM

## 2018-05-04 DIAGNOSIS — E55.9 VITAMIN D DEFICIENCY: ICD-10-CM

## 2018-05-04 DIAGNOSIS — R79.89 ELEVATED PTHRP LEVEL: Primary | ICD-10-CM

## 2018-05-04 DIAGNOSIS — E78.2 MIXED HYPERLIPIDEMIA: ICD-10-CM

## 2018-05-04 DIAGNOSIS — E66.01 MORBID OBESITY DUE TO EXCESS CALORIES (HCC): ICD-10-CM

## 2018-05-04 PROCEDURE — 99204 OFFICE O/P NEW MOD 45 MIN: CPT | Performed by: INTERNAL MEDICINE

## 2018-05-04 RX ORDER — LIRAGLUTIDE 6 MG/ML
3 INJECTION, SOLUTION SUBCUTANEOUS DAILY
Qty: 5 PEN | Refills: 5 | Status: SHIPPED | OUTPATIENT
Start: 2018-05-04 | End: 2018-05-09 | Stop reason: CLARIF

## 2018-05-04 NOTE — PROGRESS NOTES
"Subjective   Laura Cerda is a 58 y.o. female seen as a new patient for elevated PTH. She states that her legs hurt, aches, swelling, fatigue, trouble sleeping, heat/cold intolerance, choking, weakness, brain fog, and trouble concentrating.     History of Present Illness this is a 58-year-old female known patient with history of hypertension and dyslipidemia as well as the hypothyroidism and stage III chronic kidney disease.  In the process of her workup she was found to have inappropriately elevated level of parathyroid hormone and that is why she is being referred to us.  She also has fibromyalgia as well as psoriatic arthritis.  She also complains of having gained weight beyond her control and very frustrated because of inability to lose weight.  She does have some hot flashes and dyspareunia and vaginal dryness.  She said the prescription for estrogen vaginal cream she never picked it up by virtue of the fact that she has a mother with the breast cancer and has undergone double mastectomy.  That same mother has type II diabetes and high cholesterol.    /88   Resp 16   Ht 157.5 cm (62\")   Wt 101 kg (223 lb 6.4 oz)   BMI 40.86 kg/m²      Allergies   Allergen Reactions   • Augmentin [Amoxicillin-Pot Clavulanate] Hives     Temperature high   • Lyrica [Pregabalin] Swelling   • Azithromycin Rash   • Ceclor [Cefaclor] Rash       Current Outpatient Prescriptions:   •  acetaminophen (TYLENOL) 500 MG tablet, Take 500 mg by mouth as needed for mild pain (1-3)., Disp: , Rfl:   •  Cholecalciferol (VITAMIN D) 2000 UNITS capsule, Take  by mouth 2 (Two) Times a Day., Disp: , Rfl:   •  cholecalciferol (VITAMIN D3) 1000 units tablet, Take 1,000 Units by mouth Daily., Disp: , Rfl:   •  CIMZIA PREFILLED 2 X 200 MG/ML kit injection, , Disp: , Rfl: 11  •  ciprofloxacin (CIPRO) 250 MG tablet, Take 1 tablet by mouth Every 12 (Twelve) Hours., Disp: 6 tablet, Rfl: 0  •  DIFLUNISAL PO, Take 500 mg by mouth daily., Disp: , Rfl:   •  " estradiol (ESTRACE VAGINAL) 0.1 MG/GM vaginal cream, Insert 1 g into the vagina 2 (Two) Times a Week., Disp: 42.5 g, Rfl: 12  •  ezetimibe (ZETIA) 10 MG tablet, TAKE ONE TABLET BY MOUTH DAILY, Disp: 90 tablet, Rfl: 0  •  fexofenadine (ALLEGRA) 180 MG tablet, TAKE ONE TABLET BY MOUTH DAILY, Disp: 90 tablet, Rfl: 2  •  furosemide (LASIX) 40 MG tablet, Take 1 tablet by mouth Daily. (Patient taking differently: Take 20 mg by mouth Daily.), Disp: 90 tablet, Rfl: 1  •  levothyroxine (SYNTHROID, LEVOTHROID) 50 MCG tablet, TAKE ONE TABLET BY MOUTH DAILY, Disp: 90 tablet, Rfl: 1  •  Magnesium 400 MG capsule, Take 400 mg by mouth 2 (Two) Times a Day With Meals., Disp: 60 capsule, Rfl: 3  •  methocarbamol (ROBAXIN) 750 MG tablet, Take 750 mg by mouth every night at bedtime., Disp: , Rfl:   •  milnacipran (SAVELLA) 50 MG tablet tablet, Take  by mouth 2 (two) times a day., Disp: , Rfl:   •  mometasone (NASONEX) 50 MCG/ACT nasal spray, 2 sprays into each nostril Daily., Disp: , Rfl:   •  Multiple Vitamins-Minerals (MULTIVITAMIN ADULT PO), Take  by mouth daily., Disp: , Rfl:   •  Omega-3 Fatty Acids (FISH OIL) 1000 MG capsule capsule, Take  by mouth daily with breakfast., Disp: , Rfl:   •  potassium chloride (K-DUR,KLOR-CON) 20 MEQ CR tablet, 1 by mouth 3 times a day, Disp: 90 tablet, Rfl: 3  •  raNITIdine (ZANTAC) 150 MG tablet, Take 150 mg by mouth 2 (Two) Times a Day., Disp: , Rfl:     The following portions of the patient's history were reviewed and updated as appropriate: allergies, current medications, past family history, past medical history, past social history, past surgical history and problem list.    Review of Systems   Constitutional: Positive for fatigue.   HENT: Negative.    Eyes: Negative.    Respiratory: Positive for choking.    Cardiovascular: Negative.    Gastrointestinal: Negative.    Endocrine: Positive for cold intolerance and heat intolerance.   Genitourinary: Negative.    Musculoskeletal: Negative.     Skin: Negative.    Allergic/Immunologic: Negative.    Neurological: Positive for weakness.   Hematological: Negative.    Psychiatric/Behavioral: Positive for confusion, decreased concentration and sleep disturbance.       Objective   Physical Exam   Constitutional: She is oriented to person, place, and time. She appears well-developed and well-nourished. No distress.   Morbidly obese   HENT:   Head: Normocephalic and atraumatic.   Right Ear: External ear normal.   Left Ear: External ear normal.   Nose: Nose normal.   Mouth/Throat: Oropharynx is clear and moist. No oropharyngeal exudate.   Eyes: Conjunctivae and EOM are normal. Pupils are equal, round, and reactive to light. Right eye exhibits no discharge. Left eye exhibits no discharge. No scleral icterus.   Neck: Normal range of motion. Neck supple. No JVD present. No tracheal deviation present. No thyromegaly present.   Cardiovascular: Normal rate, regular rhythm, normal heart sounds and intact distal pulses.  Exam reveals no gallop and no friction rub.    No murmur heard.  Pulmonary/Chest: Effort normal and breath sounds normal. No stridor. No respiratory distress. She has no wheezes. She has no rales. She exhibits no tenderness.   Abdominal: Soft. Bowel sounds are normal. She exhibits no distension and no mass. There is no tenderness. There is no rebound and no guarding. No hernia.   Musculoskeletal: Normal range of motion. She exhibits no edema, tenderness or deformity.   Lymphadenopathy:     She has no cervical adenopathy.   Neurological: She is alert and oriented to person, place, and time. She has normal reflexes. She displays normal reflexes. No cranial nerve deficit. She exhibits normal muscle tone. Coordination normal.   Skin: Skin is warm and dry. No rash noted. She is not diaphoretic. No erythema. No pallor.   Psychiatric: She has a normal mood and affect. Her behavior is normal. Judgment and thought content normal.   Vitals  reviewed.        Assessment/Plan   Diagnoses and all orders for this visit:    Elevated PTHrP level  -     T3, Free  -     T4 & TSH ([facility])  -     T4, Free  -     Thyroglobulin With Anti-TG  -     Uric Acid  -     Vitamin D 25 Hydroxy  -     Comprehensive Metabolic Panel  -     C-Peptide  -     Follicle Stimulating Hormone  -     Hemoglobin A1c  -     Lipid Panel  -     Luteinizing Hormone  -     Prolactin  -     ACTH  -     Cortisol  -     Calcium, Ionized  -     Phosphorus  -     PTH, Intact    Hypothyroidism due to Hashimoto's thyroiditis  -     T3, Free  -     T4 & TSH ([facility])  -     T4, Free  -     Thyroglobulin With Anti-TG  -     Uric Acid  -     Vitamin D 25 Hydroxy  -     Comprehensive Metabolic Panel  -     C-Peptide  -     Follicle Stimulating Hormone  -     Hemoglobin A1c  -     Lipid Panel  -     Luteinizing Hormone  -     Prolactin  -     ACTH  -     Cortisol  -     Calcium, Ionized  -     Phosphorus  -     PTH, Intact    Primary hyperparathyroidism  -     T3, Free  -     T4 & TSH ([facility])  -     T4, Free  -     Thyroglobulin With Anti-TG  -     Uric Acid  -     Vitamin D 25 Hydroxy  -     Comprehensive Metabolic Panel  -     C-Peptide  -     Follicle Stimulating Hormone  -     Hemoglobin A1c  -     Lipid Panel  -     Luteinizing Hormone  -     Prolactin  -     ACTH  -     Cortisol  -     Calcium, Ionized  -     Phosphorus  -     PTH, Intact    Mixed hyperlipidemia  -     T3, Free  -     T4 & TSH ([facility])  -     T4, Free  -     Thyroglobulin With Anti-TG  -     Uric Acid  -     Vitamin D 25 Hydroxy  -     Comprehensive Metabolic Panel  -     C-Peptide  -     Follicle Stimulating Hormone  -     Hemoglobin A1c  -     Lipid Panel  -     Luteinizing Hormone  -     Prolactin  -     ACTH  -     Cortisol  -     Calcium, Ionized  -     Phosphorus  -     PTH, Intact    Essential hypertension  -     T3, Free  -     T4 & TSH ([facility])  -     T4, Free  -     Thyroglobulin With Anti-TG  -     Uric Acid  -      Vitamin D 25 Hydroxy  -     Comprehensive Metabolic Panel  -     C-Peptide  -     Follicle Stimulating Hormone  -     Hemoglobin A1c  -     Lipid Panel  -     Luteinizing Hormone  -     Prolactin  -     ACTH  -     Cortisol  -     Calcium, Ionized  -     Phosphorus  -     PTH, Intact    Morbid obesity due to excess calories  -     T3, Free  -     T4 & TSH ([facility])  -     T4, Free  -     Thyroglobulin With Anti-TG  -     Uric Acid  -     Vitamin D 25 Hydroxy  -     Comprehensive Metabolic Panel  -     C-Peptide  -     Follicle Stimulating Hormone  -     Hemoglobin A1c  -     Lipid Panel  -     Luteinizing Hormone  -     Prolactin  -     ACTH  -     Cortisol  -     Calcium, Ionized  -     Phosphorus  -     PTH, Intact    Fibromyalgia  -     T3, Free  -     T4 & TSH ([facility])  -     T4, Free  -     Thyroglobulin With Anti-TG  -     Uric Acid  -     Vitamin D 25 Hydroxy  -     Comprehensive Metabolic Panel  -     C-Peptide  -     Follicle Stimulating Hormone  -     Hemoglobin A1c  -     Lipid Panel  -     Luteinizing Hormone  -     Prolactin  -     ACTH  -     Cortisol  -     Calcium, Ionized  -     Phosphorus  -     PTH, Intact    Chronic fatigue  -     T3, Free  -     T4 & TSH ([facility])  -     T4, Free  -     Thyroglobulin With Anti-TG  -     Uric Acid  -     Vitamin D 25 Hydroxy  -     Comprehensive Metabolic Panel  -     C-Peptide  -     Follicle Stimulating Hormone  -     Hemoglobin A1c  -     Lipid Panel  -     Luteinizing Hormone  -     Prolactin  -     ACTH  -     Cortisol  -     Calcium, Ionized  -     Phosphorus  -     PTH, Intact    Hyperglycemia  -     T3, Free  -     T4 & TSH ([facility])  -     T4, Free  -     Thyroglobulin With Anti-TG  -     Uric Acid  -     Vitamin D 25 Hydroxy  -     Comprehensive Metabolic Panel  -     C-Peptide  -     Follicle Stimulating Hormone  -     Hemoglobin A1c  -     Lipid Panel  -     Luteinizing Hormone  -     Prolactin  -     ACTH  -     Cortisol  -     Calcium, Ionized  -      Phosphorus  -     PTH, Intact    Vitamin D deficiency  -     T3, Free  -     T4 & TSH ([facility])  -     T4, Free  -     Thyroglobulin With Anti-TG  -     Uric Acid  -     Vitamin D 25 Hydroxy  -     Comprehensive Metabolic Panel  -     C-Peptide  -     Follicle Stimulating Hormone  -     Hemoglobin A1c  -     Lipid Panel  -     Luteinizing Hormone  -     Prolactin  -     ACTH  -     Cortisol  -     Calcium, Ionized  -     Phosphorus  -     PTH, Intact    Other orders  -     SAXENDA 18 MG/3ML solution pen-injector; Inject 3 mg under the skin Daily. 0.6 mg daily for 7 days, 1.2, 1.8, 2.4 each daily for 7 days 3 mg daily as maintenance               In summary I saw and examined this 58-year-old female for above-mentioned problems.  I reviewed her laboratory evaluation and medications of 01/04/2017 as well as 08/28/2017 and 03/20/2018 and at this point we will go ahead and order a more comprehensive laboratory evaluation and once the results come back we will go ahead and call for any possible modification or new medications.  I am hesitant to continue their estrogenic replacement therapy by virtue of her mother having had and survived breast cancer in the past.  She will see Ms. Hanna Monge in 4 months or sooner if needed with laboratory evaluation prior to each office visit.

## 2018-05-07 LAB
25(OH)D3+25(OH)D2 SERPL-MCNC: 34.9 NG/ML (ref 30–100)
ACTH PLAS-MCNC: 5.3 PG/ML (ref 7.2–63.3)
ALBUMIN SERPL-MCNC: 4.4 G/DL (ref 3.5–5.2)
ALBUMIN/GLOB SERPL: 1.5 G/DL
ALP SERPL-CCNC: 105 U/L (ref 39–117)
ALT SERPL-CCNC: 26 U/L (ref 1–33)
AST SERPL-CCNC: 30 U/L (ref 1–32)
BILIRUB SERPL-MCNC: 0.2 MG/DL (ref 0.1–1.2)
BUN SERPL-MCNC: 14 MG/DL (ref 6–20)
BUN/CREAT SERPL: 13.9 (ref 7–25)
C PEPTIDE SERPL-MCNC: 3.1 NG/ML (ref 1.1–4.4)
CA-I SERPL ISE-MCNC: 5.3 MG/DL (ref 4.5–5.6)
CALCIUM SERPL-MCNC: 9.8 MG/DL (ref 8.6–10.5)
CHLORIDE SERPL-SCNC: 99 MMOL/L (ref 98–107)
CHOLEST SERPL-MCNC: 254 MG/DL (ref 0–200)
CO2 SERPL-SCNC: 27.9 MMOL/L (ref 22–29)
CORTIS SERPL-MCNC: 7.7 UG/DL
CREAT SERPL-MCNC: 1.01 MG/DL (ref 0.57–1)
FSH SERPL-ACNC: 78.7 MIU/ML
GFR SERPLBLD CREATININE-BSD FMLA CKD-EPI: 56 ML/MIN/1.73
GFR SERPLBLD CREATININE-BSD FMLA CKD-EPI: 68 ML/MIN/1.73
GLOBULIN SER CALC-MCNC: 2.9 GM/DL
GLUCOSE SERPL-MCNC: 99 MG/DL (ref 65–99)
HBA1C MFR BLD: 5.9 % (ref 4.8–5.6)
HDLC SERPL-MCNC: 75 MG/DL (ref 40–60)
INTERPRETATION: NORMAL
LDLC SERPL CALC-MCNC: 149 MG/DL (ref 0–100)
LH SERPL-ACNC: 51.6 MIU/ML
Lab: NORMAL
PHOSPHATE SERPL-MCNC: 3.8 MG/DL (ref 2.5–4.5)
POTASSIUM SERPL-SCNC: 4.4 MMOL/L (ref 3.5–5.2)
PROLACTIN SERPL-MCNC: 10.9 NG/ML (ref 4.8–23.3)
PROT SERPL-MCNC: 7.3 G/DL (ref 6–8.5)
PTH-INTACT SERPL-MCNC: 38 PG/ML (ref 15–65)
SODIUM SERPL-SCNC: 144 MMOL/L (ref 136–145)
T3FREE SERPL-MCNC: 2.8 PG/ML (ref 2–4.4)
T4 FREE SERPL-MCNC: 1.21 NG/DL (ref 0.93–1.7)
T4 SERPL-MCNC: 6.5 MCG/DL (ref 4.5–11.7)
THYROGLOB AB SERPL-ACNC: <1 IU/ML (ref 0–0.9)
THYROGLOB SERPL-MCNC: 10.4 NG/ML (ref 1.5–38.5)
TRIGL SERPL-MCNC: 150 MG/DL (ref 0–150)
TSH SERPL DL<=0.005 MIU/L-ACNC: 2.27 MIU/ML (ref 0.27–4.2)
URATE SERPL-MCNC: 5.4 MG/DL (ref 2.4–5.7)
VLDLC SERPL CALC-MCNC: 30 MG/DL (ref 5–40)

## 2018-05-09 ENCOUNTER — TELEPHONE (OUTPATIENT)
Dept: ENDOCRINOLOGY | Age: 58
End: 2018-05-09

## 2018-05-09 NOTE — TELEPHONE ENCOUNTER
Pa was started on 5/9/18 and submitted to [facility] AND WAS DENIED ON 5/12/2018. PROVIDER WAS NOTIFIED.     ----- Message from Martin Castro MD sent at 5/9/2018  9:16 AM EDT -----  IM switching to Victoza 1.8 mg daily.  It is the exact same formulation with 2 levels less potent.  ----- Message -----  From: Claudia Gant MA  Sent: 5/9/2018   9:04 AM  To: Martin Castro MD    PT'S Insurance considers Saxenda as a plan exclusion. Please advise.

## 2018-05-18 RX ORDER — LEVOTHYROXINE SODIUM 0.05 MG/1
TABLET ORAL
Qty: 90 TABLET | Refills: 0 | Status: SHIPPED | OUTPATIENT
Start: 2018-05-18 | End: 2018-08-26 | Stop reason: SDUPTHER

## 2018-06-18 RX ORDER — EXENATIDE 2 MG/.85ML
INJECTION, SUSPENSION, EXTENDED RELEASE SUBCUTANEOUS
Qty: 4 PEN | Refills: 5 | Status: ON HOLD | OUTPATIENT
Start: 2018-06-18 | End: 2018-08-24

## 2018-08-07 RX ORDER — EZETIMIBE 10 MG/1
TABLET ORAL
Qty: 90 TABLET | Refills: 0 | Status: SHIPPED | OUTPATIENT
Start: 2018-08-07 | End: 2019-03-08 | Stop reason: SDUPTHER

## 2018-08-16 ENCOUNTER — OFFICE VISIT (OUTPATIENT)
Dept: GASTROENTEROLOGY | Facility: CLINIC | Age: 58
End: 2018-08-16

## 2018-08-16 VITALS
BODY MASS INDEX: 41.33 KG/M2 | HEIGHT: 62 IN | WEIGHT: 224.6 LBS | DIASTOLIC BLOOD PRESSURE: 82 MMHG | SYSTOLIC BLOOD PRESSURE: 134 MMHG | TEMPERATURE: 98.5 F

## 2018-08-16 DIAGNOSIS — K59.00 CONSTIPATION, UNSPECIFIED CONSTIPATION TYPE: ICD-10-CM

## 2018-08-16 DIAGNOSIS — R13.10 DYSPHAGIA, UNSPECIFIED TYPE: ICD-10-CM

## 2018-08-16 DIAGNOSIS — K21.9 GASTROESOPHAGEAL REFLUX DISEASE, ESOPHAGITIS PRESENCE NOT SPECIFIED: Primary | ICD-10-CM

## 2018-08-16 DIAGNOSIS — R19.7 DIARRHEA, UNSPECIFIED TYPE: ICD-10-CM

## 2018-08-16 DIAGNOSIS — K63.5 POLYP OF COLON, UNSPECIFIED PART OF COLON, UNSPECIFIED TYPE: ICD-10-CM

## 2018-08-16 PROCEDURE — 99204 OFFICE O/P NEW MOD 45 MIN: CPT | Performed by: INTERNAL MEDICINE

## 2018-08-16 NOTE — PROGRESS NOTES
Chief Complaint   Patient presents with   • Heartburn   • Difficulty Swallowing   • Constipation   • Diarrhea        Laura Cerda is a  58 y.o. female here for an initial visit for GERD, dysphagia, constipation, diarrhea    HPI this 58-year-old white female patient of Dr. Guillermo Maldonado resterry with a known history of reflux and colon polyps.  She has been studied in the past with upper and lower endoscopic evaluations.  Per her recollection last study was 3-1/2-4 years ago.  She is due for follow-up examination of her colon given her history of polyps.  Her reflux had been well-controlled with a proton pump inhibitor but this was discontinued by her nephrologist due to concerns of worsening kidney function.  Subsequent to stopping her PPI she has had significant recurrence of her reflux.  This is also precipitated some dysphagia of solid foods.  She resumed an over-the-counter dose of PPI and has obtained significant relief but not resolution of her reflux.  I advised her to consider both upper and lower endoscopic examination at this time to better define these issues.  She is amenable to this.    Past Medical History:   Diagnosis Date   • Arthritis     psoriatic   • Chest pain    • Chronic kidney disease    • Elevated PTHrP level (CMS/HCC)    • Fatigue    • Fibromyalgia    • Fibromyalgia, primary    • Foot swelling    • GERD (gastroesophageal reflux disease)    • History of Holter monitoring 08/22/2016   • Hyperlipidemia    • Hypertension    • Hypothyroidism    • Joint pain     worsening   • Meniere's disease    • Nausea    • Osteoarthritis    • Palpitations    • Postmenopausal    • Psoriatic arthritis (CMS/HCC)    • Rapid heart rate    • Raynaud disease        Current Outpatient Prescriptions   Medication Sig Dispense Refill   • Ca Carbonate-Mag Hydroxide (ROLAIDS PO) Take  by mouth.     • Cholecalciferol (VITAMIN D) 2000 UNITS capsule Take  by mouth 2 (Two) Times a Day.     • DIFLUNISAL PO Take 500 mg by mouth  daily.     • esomeprazole (nexIUM) 20 MG capsule Take 20 mg by mouth Every Morning Before Breakfast.     • ezetimibe (ZETIA) 10 MG tablet TAKE ONE TABLET BY MOUTH DAILY 90 tablet 0   • fexofenadine (ALLEGRA) 180 MG tablet TAKE ONE TABLET BY MOUTH DAILY 90 tablet 2   • furosemide (LASIX) 40 MG tablet Take 1 tablet by mouth Daily. (Patient taking differently: Take 20 mg by mouth Daily.) 90 tablet 1   • levothyroxine (SYNTHROID, LEVOTHROID) 50 MCG tablet TAKE ONE TABLET BY MOUTH DAILY 90 tablet 0   • Magnesium 400 MG capsule Take 400 mg by mouth 2 (Two) Times a Day With Meals. 60 capsule 3   • methocarbamol (ROBAXIN) 750 MG tablet Take 750 mg by mouth every night at bedtime.     • milnacipran (SAVELLA) 50 MG tablet tablet Take  by mouth 2 (two) times a day.     • mometasone (NASONEX) 50 MCG/ACT nasal spray 2 sprays into each nostril Daily.     • Multiple Vitamins-Minerals (MULTIVITAMIN ADULT PO) Take  by mouth daily.     • Omega-3 Fatty Acids (FISH OIL) 1000 MG capsule capsule Take  by mouth daily with breakfast.     • potassium chloride (K-DUR,KLOR-CON) 20 MEQ CR tablet 1 by mouth 3 times a day 90 tablet 3   • Secukinumab (COSENTYX SC) Inject  under the skin into the appropriate area as directed.     • acetaminophen (TYLENOL) 500 MG tablet Take 500 mg by mouth as needed for mild pain (1-3).     • BYDUREON BCISE 2 MG/0.85ML auto-injector injection 1 pen injection per week 4 pen 5   • cholecalciferol (VITAMIN D3) 1000 units tablet Take 1,000 Units by mouth Daily.     • CIMZIA PREFILLED 2 X 200 MG/ML kit injection   11   • ciprofloxacin (CIPRO) 250 MG tablet Take 1 tablet by mouth Every 12 (Twelve) Hours. 6 tablet 0   • raNITIdine (ZANTAC) 150 MG tablet Take 150 mg by mouth 2 (Two) Times a Day.       No current facility-administered medications for this visit.        PRN Meds:.    Allergies   Allergen Reactions   • Augmentin [Amoxicillin-Pot Clavulanate] Hives     Temperature high   • Lyrica [Pregabalin] Swelling   •  Azithromycin Rash   • Ceclor [Cefaclor] Rash       Social History     Social History   • Marital status:      Spouse name: N/A   • Number of children: 2   • Years of education: N/A     Occupational History   • Not on file.     Social History Main Topics   • Smoking status: Never Smoker   • Smokeless tobacco: Never Used   • Alcohol use No   • Drug use: No   • Sexual activity: Yes     Partners: Male     Birth control/ protection: Post-menopausal     Other Topics Concern   • Not on file     Social History Narrative   • No narrative on file       Family History   Problem Relation Age of Onset   • Cancer Mother         Breast   • Heart failure Mother    • Hypertension Mother    • Diabetes Mother    • Inflammatory bowel disease Mother    • Diverticulitis Mother    • Cancer Father         Colon   • Diabetes Father    • Arthritis Brother    • Heart disease Maternal Grandmother    • Stroke Maternal Grandmother    • Inflammatory bowel disease Maternal Grandmother    • Diverticulitis Maternal Grandmother    • Heart disease Maternal Grandfather    • Colon cancer Paternal Grandmother        Review of Systems   Constitutional: Negative for activity change, appetite change, fatigue and unexpected weight change.   HENT: Negative for congestion, facial swelling, sore throat, trouble swallowing and voice change.    Eyes: Negative for photophobia and visual disturbance.   Respiratory: Negative for cough and choking.    Cardiovascular: Negative for chest pain.   Gastrointestinal: Positive for constipation and diarrhea. Negative for abdominal distention, abdominal pain, anal bleeding, blood in stool, nausea, rectal pain and vomiting.        GERD  Dysphasia   Endocrine: Negative for polyphagia.   Musculoskeletal: Negative for arthralgias, gait problem and joint swelling.   Skin: Negative for color change, pallor and rash.   Allergic/Immunologic: Negative for food allergies.   Neurological: Negative for speech difficulty and  headaches.   Hematological: Does not bruise/bleed easily.   Psychiatric/Behavioral: Negative for agitation, confusion and sleep disturbance.       Vitals:    08/16/18 1424   BP: 134/82   Temp: 98.5 °F (36.9 °C)       Physical Exam   Constitutional: She is oriented to person, place, and time. She appears well-developed and well-nourished. No distress.   HENT:   Head: Normocephalic.   Mouth/Throat: Oropharynx is clear and moist. No oropharyngeal exudate.   Eyes: Conjunctivae and EOM are normal. No scleral icterus.   Neck: Normal range of motion. No thyromegaly present.   Cardiovascular: Normal rate and regular rhythm.    No murmur heard.  Pulmonary/Chest: Breath sounds normal. No respiratory distress. She has no wheezes. She has no rales.   Abdominal: Soft. Bowel sounds are normal. She exhibits no distension and no mass. There is no hepatosplenomegaly. There is no tenderness.   Musculoskeletal: Normal range of motion. She exhibits no edema or tenderness.   Lymphadenopathy:     She has no cervical adenopathy.   Neurological: She is alert and oriented to person, place, and time.   Skin: Skin is warm and dry. No rash noted. She is not diaphoretic. No erythema.   Psychiatric: She has a normal mood and affect. Her behavior is normal.   Vitals reviewed.      ASSESSMENT   #1 GERD: Acute exacerbation once PPI stopped  #2 dysphagia: Better with resumption of PPI  #3 bowel pattern changes ranging from constipation to diarrhea  #4 history of polyps      PLAN  Schedule EGD and colonoscopy      ICD-10-CM ICD-9-CM   1. Gastroesophageal reflux disease, esophagitis presence not specified K21.9 530.81   2. Dysphagia, unspecified type R13.10 787.20   3. Constipation, unspecified constipation type K59.00 564.00   4. Diarrhea, unspecified type R19.7 787.91

## 2018-08-17 PROBLEM — K59.00 CONSTIPATION: Status: ACTIVE | Noted: 2018-08-17

## 2018-08-17 PROBLEM — R19.7 DIARRHEA: Status: ACTIVE | Noted: 2018-08-17

## 2018-08-17 PROBLEM — K21.9 GASTROESOPHAGEAL REFLUX DISEASE: Status: ACTIVE | Noted: 2018-08-17

## 2018-08-17 PROBLEM — K63.5 POLYP OF COLON: Status: ACTIVE | Noted: 2018-08-17

## 2018-08-17 PROBLEM — R13.10 DYSPHAGIA: Status: ACTIVE | Noted: 2018-08-17

## 2018-08-24 ENCOUNTER — ANESTHESIA (OUTPATIENT)
Dept: GASTROENTEROLOGY | Facility: HOSPITAL | Age: 58
End: 2018-08-24

## 2018-08-24 ENCOUNTER — ANESTHESIA EVENT (OUTPATIENT)
Dept: GASTROENTEROLOGY | Facility: HOSPITAL | Age: 58
End: 2018-08-24

## 2018-08-24 ENCOUNTER — HOSPITAL ENCOUNTER (OUTPATIENT)
Facility: HOSPITAL | Age: 58
Setting detail: HOSPITAL OUTPATIENT SURGERY
Discharge: HOME | End: 2018-08-24
Attending: INTERNAL MEDICINE | Admitting: INTERNAL MEDICINE

## 2018-08-24 VITALS
BODY MASS INDEX: 41.07 KG/M2 | RESPIRATION RATE: 16 BRPM | SYSTOLIC BLOOD PRESSURE: 157 MMHG | WEIGHT: 223.19 LBS | DIASTOLIC BLOOD PRESSURE: 81 MMHG | HEART RATE: 82 BPM | TEMPERATURE: 97.9 F | HEIGHT: 62 IN | OXYGEN SATURATION: 98 %

## 2018-08-24 DIAGNOSIS — R13.10 DYSPHAGIA, UNSPECIFIED TYPE: ICD-10-CM

## 2018-08-24 DIAGNOSIS — K59.00 CONSTIPATION, UNSPECIFIED CONSTIPATION TYPE: ICD-10-CM

## 2018-08-24 DIAGNOSIS — R19.7 DIARRHEA, UNSPECIFIED TYPE: ICD-10-CM

## 2018-08-24 DIAGNOSIS — K63.5 POLYP OF COLON, UNSPECIFIED PART OF COLON, UNSPECIFIED TYPE: ICD-10-CM

## 2018-08-24 DIAGNOSIS — K21.9 GASTROESOPHAGEAL REFLUX DISEASE, ESOPHAGITIS PRESENCE NOT SPECIFIED: ICD-10-CM

## 2018-08-24 PROCEDURE — 87081 CULTURE SCREEN ONLY: CPT | Performed by: INTERNAL MEDICINE

## 2018-08-24 PROCEDURE — S0260 H&P FOR SURGERY: HCPCS | Performed by: INTERNAL MEDICINE

## 2018-08-24 PROCEDURE — 45380 COLONOSCOPY AND BIOPSY: CPT | Performed by: INTERNAL MEDICINE

## 2018-08-24 PROCEDURE — 25010000002 PROPOFOL 10 MG/ML EMULSION: Performed by: ANESTHESIOLOGY

## 2018-08-24 PROCEDURE — 43239 EGD BIOPSY SINGLE/MULTIPLE: CPT | Performed by: INTERNAL MEDICINE

## 2018-08-24 PROCEDURE — 88305 TISSUE EXAM BY PATHOLOGIST: CPT | Performed by: INTERNAL MEDICINE

## 2018-08-24 PROCEDURE — 88312 SPECIAL STAINS GROUP 1: CPT | Performed by: INTERNAL MEDICINE

## 2018-08-24 RX ORDER — SODIUM CHLORIDE, SODIUM LACTATE, POTASSIUM CHLORIDE, CALCIUM CHLORIDE 600; 310; 30; 20 MG/100ML; MG/100ML; MG/100ML; MG/100ML
INJECTION, SOLUTION INTRAVENOUS CONTINUOUS PRN
Status: DISCONTINUED | OUTPATIENT
Start: 2018-08-24 | End: 2018-08-24 | Stop reason: SURG

## 2018-08-24 RX ORDER — TIZANIDINE 2 MG/1
2 TABLET ORAL NIGHTLY PRN
COMMUNITY

## 2018-08-24 RX ORDER — SODIUM CHLORIDE 9 MG/ML
1000 INJECTION, SOLUTION INTRAVENOUS CONTINUOUS
Status: DISCONTINUED | OUTPATIENT
Start: 2018-08-24 | End: 2018-08-24 | Stop reason: HOSPADM

## 2018-08-24 RX ORDER — SUCRALFATE ORAL 1 G/10ML
1 SUSPENSION ORAL 4 TIMES DAILY
Qty: 1200 ML | Refills: 3 | Status: SHIPPED | OUTPATIENT
Start: 2018-08-24 | End: 2019-02-05

## 2018-08-24 RX ORDER — PROPOFOL 10 MG/ML
VIAL (ML) INTRAVENOUS AS NEEDED
Status: DISCONTINUED | OUTPATIENT
Start: 2018-08-24 | End: 2018-08-24 | Stop reason: SURG

## 2018-08-24 RX ORDER — PROPOFOL 10 MG/ML
VIAL (ML) INTRAVENOUS CONTINUOUS PRN
Status: DISCONTINUED | OUTPATIENT
Start: 2018-08-24 | End: 2018-08-24 | Stop reason: SURG

## 2018-08-24 RX ORDER — LIDOCAINE HYDROCHLORIDE 20 MG/ML
INJECTION, SOLUTION INFILTRATION; PERINEURAL AS NEEDED
Status: DISCONTINUED | OUTPATIENT
Start: 2018-08-24 | End: 2018-08-24 | Stop reason: SURG

## 2018-08-24 RX ADMIN — SODIUM CHLORIDE, POTASSIUM CHLORIDE, SODIUM LACTATE AND CALCIUM CHLORIDE: 600; 310; 30; 20 INJECTION, SOLUTION INTRAVENOUS at 12:08

## 2018-08-24 RX ADMIN — LIDOCAINE HYDROCHLORIDE 30 MG: 20 INJECTION, SOLUTION INFILTRATION; PERINEURAL at 12:16

## 2018-08-24 RX ADMIN — PROPOFOL 180 MCG/KG/MIN: 10 INJECTION, EMULSION INTRAVENOUS at 12:16

## 2018-08-24 RX ADMIN — SODIUM CHLORIDE 1000 ML: 9 INJECTION, SOLUTION INTRAVENOUS at 11:58

## 2018-08-24 RX ADMIN — PROPOFOL 140 MG: 10 INJECTION, EMULSION INTRAVENOUS at 12:16

## 2018-08-24 NOTE — ANESTHESIA POSTPROCEDURE EVALUATION
"Patient: Laura Cerda    Procedure Summary     Date:  08/24/18 Room / Location:  [facility] ENDOSCOPY 10 / [facility] ENDOSCOPY    Anesthesia Start:  1208 Anesthesia Stop:  1300    Procedures:       COLONOSCOPY INTO CECUM AND TERMINAL ILEUM WTIH COLD BIOPSIES (N/A )      ESOPHAGOGASTRODUODENOSCOPY WITH BIOPSIES (N/A Esophagus) Diagnosis:       Constipation, unspecified constipation type      Gastroesophageal reflux disease, esophagitis presence not specified      Dysphagia, unspecified type      Diarrhea, unspecified type      Polyp of colon, unspecified part of colon, unspecified type      (Constipation, unspecified constipation type [K59.00])      (Gastroesophageal reflux disease, esophagitis presence not specified [K21.9])      (Dysphagia, unspecified type [R13.10])      (Diarrhea, unspecified type [R19.7])      (Polyp of colon, unspecified part of colon, unspecified type [K63.5])    Surgeon:  Reji Rodríguez MD Provider:  Juan Daniel Holt MD    Anesthesia Type:  MAC ASA Status:  3          Anesthesia Type: MAC  Last vitals  BP   145/92 (08/24/18 1147)   Temp   36.7 °C (98.1 °F) (08/24/18 1147)   Pulse   89 (08/24/18 1147)   Resp   16 (08/24/18 1147)     SpO2   97 % (08/24/18 1147)     Post Anesthesia Care and Evaluation    Patient location during evaluation: bedside  Patient participation: complete - patient participated  Level of consciousness: awake and alert  Pain score: 0  Pain management: adequate  Airway patency: patent  Anesthetic complications: No anesthetic complications    Cardiovascular status: acceptable  Respiratory status: acceptable  Hydration status: acceptable    Comments: /92 (BP Location: Left arm, Patient Position: Lying)   Pulse 89   Temp 36.7 °C (98.1 °F) (Oral)   Resp 16   Ht 157.5 cm (62\")   Wt 101 kg (223 lb 3 oz)   SpO2 97%   BMI 40.82 kg/m²       "

## 2018-08-24 NOTE — ANESTHESIA PREPROCEDURE EVALUATION
Anesthesia Evaluation     Patient summary reviewed and Nursing notes reviewed   history of anesthetic complications: PONV  NPO Solid Status: > 8 hours  NPO Liquid Status: > 2 hours           Airway   Mallampati: III  TM distance: >3 FB  Neck ROM: full  no difficulty expected  Dental - normal exam     Pulmonary - normal exam   (+) sleep apnea,   (-) COPD, asthma, not a smoker, lung cancer  Cardiovascular - normal exam  Exercise tolerance: good (4-7 METS)    Rhythm: regular  Rate: normal    (+) hypertension well controlled 2 medications or greater, hyperlipidemia,   (-) past MI, CAD, dysrhythmias, angina, cardiac stents      Neuro/Psych  (+) psychiatric history,     (-) seizures, TIA, CVA  GI/Hepatic/Renal/Endo    (+) morbid obesity, GERD poorly controlled,  renal disease CRI, diabetes mellitus well controlled, hypothyroidism,   (-) hepatitis, liver disease    Musculoskeletal     (+) myalgias,   Abdominal  - normal exam   Substance History - negative use     OB/GYN          Other   (+) arthritis                   Anesthesia Plan    ASA 3     MAC     intravenous induction   Anesthetic plan and risks discussed with patient.    Plan discussed with attending.

## 2018-08-27 RX ORDER — LEVOTHYROXINE SODIUM 0.05 MG/1
TABLET ORAL
Qty: 90 TABLET | Refills: 0 | Status: SHIPPED | OUTPATIENT
Start: 2018-08-27 | End: 2018-12-03 | Stop reason: SDUPTHER

## 2018-08-28 ENCOUNTER — TELEPHONE (OUTPATIENT)
Dept: GASTROENTEROLOGY | Facility: CLINIC | Age: 58
End: 2018-08-28

## 2018-08-28 NOTE — TELEPHONE ENCOUNTER
Called pt and advised per Dr Rodríguez that the duodenum bx was normal. The stomach bx showed no active inflammation and was neg for h pylori.  The ge junction bx showed acute and chronic inflammation and was neg for intestinal metaplasia.  The distal esophageal bx showed ulceration and acute inflammation .  The colon bx showed no inflammation.  She also had a benign polyp.   Advised pt that they are checking for fungal stains.   Called Pathology and spoke with Kristen who advised that the stains are not back yet.     Advised that she needs to continue ppi and sucralfate and get a repeat egd in 3 mo to assess healing.     Pt verb understanding and reports that her Kidney doctor is concerned about her taking a ppi.  She states she call them and see what they recommend and will let us know.      Also pt reports that yesterday she drank a lot of purple crystal light and today she is having black diarrhea.  She is asking if she should be concerned.  Advised would send message to Dr Abad.

## 2018-08-28 NOTE — TELEPHONE ENCOUNTER
----- Message from Danna Curiel sent at 8/28/2018  1:23 PM EDT -----  Regarding: PT CALLED - [employer] DIARRHEA   Contact: [phone]  PT SAID SHE DID DRINK PURPLE CRYSTAL LIGHT. DIDN'T KNOW IF THIS COULD BE THE PROBLEM. SHE HAS DIARRHEA & CONSTIPATION BACK AND FORTH.

## 2018-08-28 NOTE — TELEPHONE ENCOUNTER
----- Message from Reji SINGH MD sent at 8/27/2018 12:09 PM EDT -----  Regarding: Biopsy results  Okay to call results, would continue PPI and mucosal defense agent.  Needs follow-up EGD in 3 months to confirm healing.  ----- Message -----  From: Lab, Background User  Sent: 8/25/2018   4:36 PM  To: Reji SINGH MD

## 2018-08-28 NOTE — TELEPHONE ENCOUNTER
May need to consider PPI and Carafate treatment to allow for ulcer healing with follow-up examination in 3 months.  If healing occurs, can then consider switch to a histamine blocker therapy.  Follow-up colonoscopy should be in 5 years given her history of polyps.  Not sure what her black diarrhea is associated with, if it persists would need to test to see if there is evidence of blood.

## 2018-08-29 ENCOUNTER — PREP FOR SURGERY (OUTPATIENT)
Dept: OTHER | Facility: HOSPITAL | Age: 58
End: 2018-08-29

## 2018-08-29 DIAGNOSIS — K22.10 ULCER OF ESOPHAGUS WITHOUT BLEEDING: Primary | ICD-10-CM

## 2018-08-29 RX ORDER — PANTOPRAZOLE SODIUM 40 MG/1
40 TABLET, DELAYED RELEASE ORAL DAILY
Qty: 90 TABLET | Refills: 1 | Status: SHIPPED | OUTPATIENT
Start: 2018-08-29 | End: 2019-10-02 | Stop reason: SDUPTHER

## 2018-08-29 RX ORDER — SODIUM CHLORIDE, SODIUM LACTATE, POTASSIUM CHLORIDE, CALCIUM CHLORIDE 600; 310; 30; 20 MG/100ML; MG/100ML; MG/100ML; MG/100ML
30 INJECTION, SOLUTION INTRAVENOUS CONTINUOUS
Status: CANCELLED | OUTPATIENT
Start: 2018-11-30

## 2018-08-29 NOTE — TELEPHONE ENCOUNTER
Called pt and advised per Dr Rodríguez that she may need to consider ppi and carafate treatment to allow for ulcer healing with f/u exam in 3 mo.  If healing occurs , can then consider  Switch to a histamine blocker therapy.  Advised to have repeat c/s in 5 yrs given her hx of polyps.      Pt verb understanding and reports that her stools are back to normal.  She states that she has not heard back [facility] and is asking us to call.  Advised pt would call and get back with her.     Called [facility] at [phone] and spoke with ASPEN Balderrama who advised that they have the pt taking pantoprazole 40mg po daily and she can take this.      Called pt and advised of the above ,  Also advised we have escribed this to her Cindy James. Pt verb understanding.

## 2018-09-11 DIAGNOSIS — E03.8 HYPOTHYROIDISM DUE TO HASHIMOTO'S THYROIDITIS: ICD-10-CM

## 2018-09-11 DIAGNOSIS — E21.0 PRIMARY HYPERPARATHYROIDISM (HCC): ICD-10-CM

## 2018-09-11 DIAGNOSIS — E55.9 VITAMIN D DEFICIENCY: Primary | ICD-10-CM

## 2018-09-11 DIAGNOSIS — E06.3 HYPOTHYROIDISM DUE TO HASHIMOTO'S THYROIDITIS: ICD-10-CM

## 2018-09-13 PROBLEM — K22.10 ULCER OF ESOPHAGUS WITHOUT BLEEDING: Status: ACTIVE | Noted: 2018-09-13

## 2018-10-11 ENCOUNTER — RESULTS ENCOUNTER (OUTPATIENT)
Dept: ENDOCRINOLOGY | Age: 58
End: 2018-10-11

## 2018-10-11 DIAGNOSIS — E21.0 PRIMARY HYPERPARATHYROIDISM (HCC): ICD-10-CM

## 2018-10-11 DIAGNOSIS — E03.8 HYPOTHYROIDISM DUE TO HASHIMOTO'S THYROIDITIS: ICD-10-CM

## 2018-10-11 DIAGNOSIS — E55.9 VITAMIN D DEFICIENCY: ICD-10-CM

## 2018-10-11 DIAGNOSIS — E06.3 HYPOTHYROIDISM DUE TO HASHIMOTO'S THYROIDITIS: ICD-10-CM

## 2018-11-30 ENCOUNTER — ANESTHESIA (OUTPATIENT)
Dept: GASTROENTEROLOGY | Facility: HOSPITAL | Age: 58
End: 2018-11-30

## 2018-11-30 ENCOUNTER — ANESTHESIA EVENT (OUTPATIENT)
Dept: GASTROENTEROLOGY | Facility: HOSPITAL | Age: 58
End: 2018-11-30

## 2018-11-30 ENCOUNTER — HOSPITAL ENCOUNTER (OUTPATIENT)
Facility: HOSPITAL | Age: 58
Setting detail: HOSPITAL OUTPATIENT SURGERY
Discharge: HOME | End: 2018-11-30
Attending: INTERNAL MEDICINE | Admitting: INTERNAL MEDICINE

## 2018-11-30 VITALS
BODY MASS INDEX: 41.77 KG/M2 | DIASTOLIC BLOOD PRESSURE: 61 MMHG | TEMPERATURE: 98.2 F | RESPIRATION RATE: 16 BRPM | HEIGHT: 62 IN | HEART RATE: 84 BPM | SYSTOLIC BLOOD PRESSURE: 133 MMHG | WEIGHT: 227 LBS | OXYGEN SATURATION: 98 %

## 2018-11-30 DIAGNOSIS — K22.10 ULCER OF ESOPHAGUS WITHOUT BLEEDING: ICD-10-CM

## 2018-11-30 PROCEDURE — 43239 EGD BIOPSY SINGLE/MULTIPLE: CPT | Performed by: INTERNAL MEDICINE

## 2018-11-30 PROCEDURE — 25010000002 PROPOFOL 10 MG/ML EMULSION: Performed by: ANESTHESIOLOGY

## 2018-11-30 PROCEDURE — 88305 TISSUE EXAM BY PATHOLOGIST: CPT | Performed by: INTERNAL MEDICINE

## 2018-11-30 PROCEDURE — 87081 CULTURE SCREEN ONLY: CPT | Performed by: INTERNAL MEDICINE

## 2018-11-30 PROCEDURE — 25010000002 PROPOFOL 1000 MG/ML EMULSION: Performed by: ANESTHESIOLOGY

## 2018-11-30 PROCEDURE — S0260 H&P FOR SURGERY: HCPCS | Performed by: INTERNAL MEDICINE

## 2018-11-30 RX ORDER — SODIUM CHLORIDE 9 MG/ML
30 INJECTION, SOLUTION INTRAVENOUS CONTINUOUS PRN
Status: DISCONTINUED | OUTPATIENT
Start: 2018-11-30 | End: 2018-11-30 | Stop reason: HOSPADM

## 2018-11-30 RX ORDER — SODIUM CHLORIDE, SODIUM LACTATE, POTASSIUM CHLORIDE, CALCIUM CHLORIDE 600; 310; 30; 20 MG/100ML; MG/100ML; MG/100ML; MG/100ML
30 INJECTION, SOLUTION INTRAVENOUS CONTINUOUS
Status: DISCONTINUED | OUTPATIENT
Start: 2018-11-30 | End: 2018-11-30 | Stop reason: HOSPADM

## 2018-11-30 RX ORDER — SODIUM CHLORIDE 0.9 % (FLUSH) 0.9 %
3-10 SYRINGE (ML) INJECTION AS NEEDED
Status: DISCONTINUED | OUTPATIENT
Start: 2018-11-30 | End: 2018-11-30 | Stop reason: HOSPADM

## 2018-11-30 RX ORDER — LIDOCAINE HYDROCHLORIDE 20 MG/ML
INJECTION, SOLUTION INFILTRATION; PERINEURAL AS NEEDED
Status: DISCONTINUED | OUTPATIENT
Start: 2018-11-30 | End: 2018-11-30 | Stop reason: SURG

## 2018-11-30 RX ORDER — SODIUM CHLORIDE 0.9 % (FLUSH) 0.9 %
3 SYRINGE (ML) INJECTION EVERY 12 HOURS SCHEDULED
Status: DISCONTINUED | OUTPATIENT
Start: 2018-11-30 | End: 2018-11-30 | Stop reason: HOSPADM

## 2018-11-30 RX ORDER — PROPOFOL 10 MG/ML
VIAL (ML) INTRAVENOUS AS NEEDED
Status: DISCONTINUED | OUTPATIENT
Start: 2018-11-30 | End: 2018-11-30 | Stop reason: SURG

## 2018-11-30 RX ADMIN — SODIUM CHLORIDE, POTASSIUM CHLORIDE, SODIUM LACTATE AND CALCIUM CHLORIDE: 600; 310; 30; 20 INJECTION, SOLUTION INTRAVENOUS at 09:01

## 2018-11-30 RX ADMIN — PROPOFOL 180 MCG/KG/MIN: 10 INJECTION, EMULSION INTRAVENOUS at 09:21

## 2018-11-30 RX ADMIN — LIDOCAINE HYDROCHLORIDE 60 MG: 20 INJECTION, SOLUTION INFILTRATION; PERINEURAL at 09:21

## 2018-11-30 RX ADMIN — PROPOFOL 150 MG: 10 INJECTION, EMULSION INTRAVENOUS at 09:21

## 2018-11-30 RX ADMIN — SODIUM CHLORIDE 30 ML/HR: 9 INJECTION, SOLUTION INTRAVENOUS at 09:07

## 2018-11-30 NOTE — ANESTHESIA PREPROCEDURE EVALUATION
Anesthesia Evaluation     Patient summary reviewed   history of anesthetic complications: PONV  NPO Solid Status: > 8 hours  NPO Liquid Status: > 8 hours           Airway   Mallampati: II  TM distance: >3 FB  Dental      Pulmonary    Cardiovascular     Rhythm: regular  Rate: normal    (+) hyperlipidemia,       Neuro/Psych  GI/Hepatic/Renal/Endo    (+) morbid obesity, GERD, PUD, GI bleeding, hypothyroidism,     Musculoskeletal     (+) myalgias,   Abdominal    Substance History      OB/GYN          Other   (+) arthritis                     Anesthesia Plan    ASA 3     MAC   total IV anesthesia  Anesthetic plan, all risks, benefits, and alternatives have been provided, discussed and informed consent has been obtained with: patient.

## 2018-11-30 NOTE — ANESTHESIA POSTPROCEDURE EVALUATION
"Patient: Laura Cerda    Procedure Summary     Date:  11/30/18 Room / Location:  [facility] ENDOSCOPY 9 / [facility] ENDOSCOPY    Anesthesia Start:  0916 Anesthesia Stop:  0929    Procedure:  ESOPHAGOGASTRODUODENOSCOPY (N/A Esophagus) Diagnosis:       Esophagitis      Hiatal hernia      Duodenogastric bile reflux      Gastritis      (Ulcer of esophagus without bleeding [K22.10])    Surgeon:  Reji Rodríguez MD Provider:  Patty Orona MD    Anesthesia Type:  MAC ASA Status:  3          Anesthesia Type: MAC  Last vitals  BP   133/61 (11/30/18 0951)   Temp   36.8 °C (98.2 °F) (11/30/18 0941)   Pulse   84 (11/30/18 0951)   Resp   16 (11/30/18 0951)     SpO2   98 % (11/30/18 0951)     Post Anesthesia Care and Evaluation    Patient location during evaluation: bedside  Patient participation: complete - patient participated  Level of consciousness: awake and alert  Pain management: adequate  Airway patency: patent  Anesthetic complications: No anesthetic complications  PONV Status: none  Cardiovascular status: acceptable  Respiratory status: acceptable  Hydration status: acceptable    Comments: /61 (BP Location: Left arm, Patient Position: Sitting)   Pulse 84   Temp 36.8 °C (98.2 °F) (Oral)   Resp 16   Ht 157.5 cm (62\")   Wt 103 kg (227 lb)   SpO2 98%   BMI 41.52 kg/m²         "

## 2018-12-01 LAB — UREASE TISS QL: NEGATIVE

## 2018-12-03 LAB
CYTO UR: NORMAL
LAB AP CASE REPORT: NORMAL
PATH REPORT.FINAL DX SPEC: NORMAL
PATH REPORT.GROSS SPEC: NORMAL

## 2018-12-04 RX ORDER — LEVOTHYROXINE SODIUM 0.05 MG/1
TABLET ORAL
Qty: 90 TABLET | Refills: 0 | Status: SHIPPED | OUTPATIENT
Start: 2018-12-04 | End: 2019-03-08 | Stop reason: SDUPTHER

## 2018-12-05 ENCOUNTER — TELEPHONE (OUTPATIENT)
Dept: FAMILY MEDICINE CLINIC | Facility: CLINIC | Age: 58
End: 2018-12-05

## 2018-12-05 ENCOUNTER — TELEPHONE (OUTPATIENT)
Dept: GASTROENTEROLOGY | Facility: CLINIC | Age: 58
End: 2018-12-05

## 2018-12-05 ENCOUNTER — OFFICE VISIT (OUTPATIENT)
Dept: FAMILY MEDICINE CLINIC | Facility: CLINIC | Age: 58
End: 2018-12-05

## 2018-12-05 VITALS
SYSTOLIC BLOOD PRESSURE: 138 MMHG | BODY MASS INDEX: 41.92 KG/M2 | HEART RATE: 96 BPM | WEIGHT: 227.8 LBS | TEMPERATURE: 98.2 F | OXYGEN SATURATION: 98 % | DIASTOLIC BLOOD PRESSURE: 78 MMHG | HEIGHT: 62 IN

## 2018-12-05 DIAGNOSIS — R79.89 ELEVATED PTHRP LEVEL: ICD-10-CM

## 2018-12-05 DIAGNOSIS — E78.2 MIXED HYPERLIPIDEMIA: Primary | ICD-10-CM

## 2018-12-05 DIAGNOSIS — R73.03 PREDIABETES: ICD-10-CM

## 2018-12-05 DIAGNOSIS — E06.3 HYPOTHYROIDISM DUE TO HASHIMOTO'S THYROIDITIS: ICD-10-CM

## 2018-12-05 DIAGNOSIS — K21.9 GASTROESOPHAGEAL REFLUX DISEASE, ESOPHAGITIS PRESENCE NOT SPECIFIED: ICD-10-CM

## 2018-12-05 DIAGNOSIS — E55.9 VITAMIN D DEFICIENCY: ICD-10-CM

## 2018-12-05 DIAGNOSIS — E03.8 HYPOTHYROIDISM DUE TO HASHIMOTO'S THYROIDITIS: ICD-10-CM

## 2018-12-05 DIAGNOSIS — E66.01 MORBID OBESITY DUE TO EXCESS CALORIES (HCC): ICD-10-CM

## 2018-12-05 LAB
25(OH)D3 SERPL-MCNC: 28.3 NG/ML (ref 30–100)
ALBUMIN SERPL-MCNC: 4.1 G/DL (ref 3.5–5.2)
ALBUMIN/GLOB SERPL: 1.1 G/DL
ALP SERPL-CCNC: 119 U/L (ref 39–117)
ALT SERPL W P-5'-P-CCNC: 16 U/L (ref 1–33)
AMORPH URATE CRY URNS QL MICRO: ABNORMAL /HPF
ANION GAP SERPL CALCULATED.3IONS-SCNC: 12.8 MMOL/L
AST SERPL-CCNC: 19 U/L (ref 1–32)
BACTERIA UR QL AUTO: ABNORMAL /HPF
BILIRUB SERPL-MCNC: 0.4 MG/DL (ref 0.1–1.2)
BILIRUB UR QL STRIP: NEGATIVE
BUN BLD-MCNC: 10 MG/DL (ref 6–20)
BUN/CREAT SERPL: 11.2 (ref 7–25)
CALCIUM SPEC-SCNC: 9.9 MG/DL (ref 8.6–10.5)
CHLORIDE SERPL-SCNC: 101 MMOL/L (ref 98–107)
CHOLEST SERPL-MCNC: 230 MG/DL (ref 0–200)
CLARITY UR: CLEAR
CO2 SERPL-SCNC: 29.2 MMOL/L (ref 22–29)
COLOR UR: YELLOW
CREAT BLD-MCNC: 0.89 MG/DL (ref 0.57–1)
ERYTHROCYTE [DISTWIDTH] IN BLOOD BY AUTOMATED COUNT: 15.5 % (ref 4.5–15)
GFR SERPL CREATININE-BSD FRML MDRD: 65 ML/MIN/1.73
GLOBULIN UR ELPH-MCNC: 3.6 GM/DL
GLUCOSE BLD-MCNC: 110 MG/DL (ref 65–99)
GLUCOSE UR STRIP-MCNC: NEGATIVE MG/DL
HBA1C MFR BLD: 6.06 % (ref 4.8–5.6)
HCT VFR BLD AUTO: 42.2 % (ref 31–42)
HDLC SERPL-MCNC: 62 MG/DL (ref 40–60)
HGB BLD-MCNC: 13.4 G/DL (ref 12–18)
HGB UR QL STRIP.AUTO: NEGATIVE
KETONES UR QL STRIP: NEGATIVE
LDLC SERPL CALC-MCNC: 140 MG/DL (ref 0–100)
LDLC/HDLC SERPL: 2.26 {RATIO}
LEUKOCYTE ESTERASE UR QL STRIP.AUTO: NEGATIVE
LYMPHOCYTES # BLD AUTO: 1.9 10*3/MM3 (ref 1.2–3.4)
LYMPHOCYTES NFR BLD AUTO: 29.5 % (ref 21–51)
MCH RBC QN AUTO: 28.1 PG (ref 26.1–33.1)
MCHC RBC AUTO-ENTMCNC: 31.8 G/DL (ref 33–37)
MCV RBC AUTO: 88.3 FL (ref 80–99)
MONOCYTES # BLD AUTO: 0.4 10*3/MM3 (ref 0.1–0.6)
MONOCYTES NFR BLD AUTO: 6.3 % (ref 2–9)
NEUTROPHILS # BLD AUTO: 4.2 10*3/MM3 (ref 1.4–6.5)
NEUTROPHILS NFR BLD AUTO: 64.2 % (ref 42–75)
NITRITE UR QL STRIP: NEGATIVE
PH UR STRIP.AUTO: 5.5 [PH] (ref 4.6–8)
PLATELET # BLD AUTO: 190 10*3/MM3 (ref 150–450)
PMV BLD AUTO: 10 FL (ref 7.1–10.5)
POTASSIUM BLD-SCNC: 3.8 MMOL/L (ref 3.5–5.2)
PROT SERPL-MCNC: 7.7 G/DL (ref 6–8.5)
PROT UR QL STRIP: NEGATIVE
PTH-INTACT SERPL-MCNC: 67.5 PG/ML (ref 15–65)
RBC # BLD AUTO: 4.78 10*6/MM3 (ref 4–6)
RBC # UR: ABNORMAL /HPF
REF LAB TEST METHOD: ABNORMAL
SODIUM BLD-SCNC: 143 MMOL/L (ref 136–145)
SP GR UR STRIP: >=1.03 (ref 1–1.03)
SQUAMOUS #/AREA URNS HPF: ABNORMAL /HPF
TRIGL SERPL-MCNC: 140 MG/DL (ref 0–150)
TSH SERPL DL<=0.05 MIU/L-ACNC: 2.23 MIU/ML (ref 0.27–4.2)
UROBILINOGEN UR QL STRIP: NORMAL
VLDLC SERPL-MCNC: 28 MG/DL (ref 5–40)
WBC NRBC COR # BLD: 6.6 10*3/MM3 (ref 4.5–10)
WBC UR QL AUTO: ABNORMAL /HPF

## 2018-12-05 PROCEDURE — 84443 ASSAY THYROID STIM HORMONE: CPT | Performed by: NURSE PRACTITIONER

## 2018-12-05 PROCEDURE — 80053 COMPREHEN METABOLIC PANEL: CPT | Performed by: NURSE PRACTITIONER

## 2018-12-05 PROCEDURE — 85025 COMPLETE CBC W/AUTO DIFF WBC: CPT | Performed by: NURSE PRACTITIONER

## 2018-12-05 PROCEDURE — 83970 ASSAY OF PARATHORMONE: CPT | Performed by: NURSE PRACTITIONER

## 2018-12-05 PROCEDURE — 80061 LIPID PANEL: CPT | Performed by: NURSE PRACTITIONER

## 2018-12-05 PROCEDURE — 83036 HEMOGLOBIN GLYCOSYLATED A1C: CPT | Performed by: NURSE PRACTITIONER

## 2018-12-05 PROCEDURE — 99214 OFFICE O/P EST MOD 30 MIN: CPT | Performed by: NURSE PRACTITIONER

## 2018-12-05 PROCEDURE — 81001 URINALYSIS AUTO W/SCOPE: CPT | Performed by: NURSE PRACTITIONER

## 2018-12-05 PROCEDURE — 36415 COLL VENOUS BLD VENIPUNCTURE: CPT | Performed by: NURSE PRACTITIONER

## 2018-12-05 PROCEDURE — 82306 VITAMIN D 25 HYDROXY: CPT | Performed by: NURSE PRACTITIONER

## 2018-12-05 RX ORDER — ATORVASTATIN CALCIUM 20 MG/1
20 TABLET, FILM COATED ORAL NIGHTLY
Qty: 90 TABLET | Refills: 3 | Status: SHIPPED | OUTPATIENT
Start: 2018-12-05 | End: 2019-08-05 | Stop reason: SDUPTHER

## 2018-12-05 RX ORDER — ERGOCALCIFEROL 1.25 MG/1
50000 CAPSULE ORAL WEEKLY
Qty: 12 CAPSULE | Refills: 0 | Status: SHIPPED | OUTPATIENT
Start: 2018-12-05 | End: 2019-08-05 | Stop reason: SDUPTHER

## 2018-12-05 NOTE — PROGRESS NOTES
Subjective   Laura Cerda is a 58 y.o. female.     History of Present Illness   Here to FU on chronic chol on zetia 10 mg with last chol 05/18 elevated and fasting today, not allergic to statins unsure if has ever taken  Has seen Dr Rodríguez GI 08/18 colonoscopy and 11/18 EGD with ulcer using carafate QID and protonix 40 mg with cont acid reflux  Saw endo Dr Castro 05/18 for elevated calcium and PTH and recommended weight loss with saxenda or victoza with normal calcium levels on recheck, and never took medication as concern about kidney function, with CKD3 sees nephrology Dr Mcmillan monitoring every 6 mo on lasix 20 mg and potassium 20 mEq, with chronic HTN no CP dizziness HA LE edema, with chronic hypothyroid on levothyroxine 50 mcg daily with last TSH 05/8 well controlled, with prediabetes last A1C 5.9 05/18, with vit D def taking 2000 u daily request recheck today  Sees Rheum Dr Contreras for psoriatic arthritis on costentyx and already had flu shot this year    The following portions of the patient's history were reviewed and updated as appropriate: allergies, current medications, past family history, past medical history, past social history, past surgical history and problem list.    Review of Systems   Constitutional: Negative for fever.   Respiratory: Negative for cough, shortness of breath and wheezing.    Cardiovascular: Negative for chest pain, palpitations and leg swelling.   Gastrointestinal: Negative for abdominal distention, abdominal pain, anal bleeding, blood in stool, constipation, diarrhea, nausea, rectal pain and vomiting.        Heartburn   Endocrine: Negative for cold intolerance, heat intolerance, polydipsia, polyphagia and polyuria.   Neurological: Negative for dizziness and headaches.   All other systems reviewed and are negative.      Objective   Physical Exam   Constitutional: She is oriented to person, place, and time. She appears well-developed and well-nourished.   HENT:   Head:  Normocephalic and atraumatic.   Eyes: Conjunctivae and EOM are normal. Pupils are equal, round, and reactive to light.   Neck: Normal range of motion. Neck supple. No thyromegaly present.   Cardiovascular: Normal rate, regular rhythm and normal heart sounds.   Pulmonary/Chest: Effort normal and breath sounds normal.   Musculoskeletal: Normal range of motion.   Lymphadenopathy:     She has no cervical adenopathy.   Neurological: She is alert and oriented to person, place, and time.   Skin: Skin is warm and dry.   Psychiatric: She has a normal mood and affect. Her behavior is normal. Judgment and thought content normal.   Vitals reviewed.      Assessment/Plan   Laura was seen today for lab f/u and hyperlipidemia.    Diagnoses and all orders for this visit:    Mixed hyperlipidemia  -     CBC & Differential  -     Comprehensive Metabolic Panel  -     TSH  -     Lipid Panel  -     CBC Auto Differential    Prediabetes  -     Comprehensive Metabolic Panel  -     Urinalysis With Microscopic - Urine, Clean Catch  -     Hemoglobin A1c  -     Urinalysis without microscopic (no culture) - Urine, Clean Catch  -     Urinalysis, Microscopic Only - Urine, Clean Catch    Elevated PTHrP level  -     Comprehensive Metabolic Panel  -     TSH  -     Urinalysis With Microscopic - Urine, Clean Catch  -     PTH, Intact  -     Urinalysis without microscopic (no culture) - Urine, Clean Catch  -     Urinalysis, Microscopic Only - Urine, Clean Catch    Hypothyroidism due to Hashimoto's thyroiditis  -     TSH    Morbid obesity due to excess calories (CMS/HCC)    Gastroesophageal reflux disease, esophagitis presence not specified  -     CBC & Differential  -     Comprehensive Metabolic Panel  -     TSH  -     Lipid Panel  -     Urinalysis With Microscopic - Urine, Clean Catch  -     CBC Auto Differential  -     Urinalysis without microscopic (no culture) - Urine, Clean Catch  -     Urinalysis, Microscopic Only - Urine, Clean Catch    Vitamin D  deficiency  -     Vitamin D 25 Hydroxy    recheck labs and call with results, cont all chronic dz meds stable, Enc healthy diet and regular exercise for wt loss, cont FU with nephrology and consider endo consult pending labs

## 2018-12-05 NOTE — TELEPHONE ENCOUNTER
Thyroid, kidney and liver normal. Chol is still high on zetia 10 mg daily, recommend add lipitor 20 mg HS, erx to pharmacy and we can recheck levels in 3-6 mo. Parathyroid hormone elevated again, recommend you FU with Hanna Monge at [facility] or I can refer to different endo if she would like. BS sl elevated 110 and A1C 6 still prediabetic enc healthy diet and regular exercise for wt loss. Vit D is low 28, erx vit D 50,000 u once weekly x 12 wks then resume daily vit D supplement. No anemias. Urine normal.

## 2018-12-05 NOTE — PATIENT INSTRUCTIONS
recheck labs and call with results, cont all chronic dz meds stable, Enc healthy diet and regular exercise for wt loss, cont FU with nephrology and consider endo consult pending labs

## 2018-12-05 NOTE — TELEPHONE ENCOUNTER
----- Message from Reji SINGH MD sent at 12/4/2018  5:49 PM EST -----  Regarding: Biopsy results  Okay to call results, recommend continue PPI, can taper or stop Carafate as tolerated.  ----- Message -----  From: Lab, Background User  Sent: 12/1/2018   1:33 PM  To: Reji SINGH MD

## 2018-12-19 LAB
25(OH)D3+25(OH)D2 SERPL-MCNC: 34.7 NG/ML (ref 30–100)
ALBUMIN SERPL-MCNC: 4.3 G/DL (ref 3.5–5.2)
ALBUMIN/GLOB SERPL: 1.4 G/DL
ALP SERPL-CCNC: 123 U/L (ref 39–117)
ALT SERPL-CCNC: 16 U/L (ref 1–33)
AST SERPL-CCNC: 21 U/L (ref 1–32)
BILIRUB SERPL-MCNC: 0.4 MG/DL (ref 0.1–1.2)
BUN SERPL-MCNC: 15 MG/DL (ref 6–20)
BUN/CREAT SERPL: 14.2 (ref 7–25)
CA-I SERPL ISE-MCNC: 5.4 MG/DL (ref 4.5–5.6)
CALCIUM SERPL-MCNC: 10.3 MG/DL (ref 8.6–10.5)
CHLORIDE SERPL-SCNC: 98 MMOL/L (ref 98–107)
CHOLEST SERPL-MCNC: 175 MG/DL (ref 0–200)
CO2 SERPL-SCNC: 27.2 MMOL/L (ref 22–29)
CREAT SERPL-MCNC: 1.06 MG/DL (ref 0.57–1)
GLOBULIN SER CALC-MCNC: 3.1 GM/DL
GLUCOSE SERPL-MCNC: 101 MG/DL (ref 65–99)
HDLC SERPL-MCNC: 68 MG/DL (ref 40–60)
INTERPRETATION: NORMAL
LDLC SERPL CALC-MCNC: 84 MG/DL (ref 0–100)
PHOSPHATE SERPL-MCNC: 3.4 MG/DL (ref 2.5–4.5)
POTASSIUM SERPL-SCNC: 4.3 MMOL/L (ref 3.5–5.2)
PROT SERPL-MCNC: 7.4 G/DL (ref 6–8.5)
PTH-INTACT SERPL-MCNC: 52 PG/ML (ref 15–65)
SODIUM SERPL-SCNC: 140 MMOL/L (ref 136–145)
T3FREE SERPL-MCNC: 2.4 PG/ML (ref 2–4.4)
T4 FREE SERPL-MCNC: 1.1 NG/DL (ref 0.93–1.7)
THYROGLOB AB SERPL-ACNC: <1 IU/ML
THYROGLOB SERPL-MCNC: 8.7 NG/ML
THYROGLOB SERPL-MCNC: NORMAL NG/ML
TRIGL SERPL-MCNC: 114 MG/DL (ref 0–150)
VLDLC SERPL CALC-MCNC: 22.8 MG/DL (ref 5–40)

## 2018-12-28 ENCOUNTER — OFFICE VISIT (OUTPATIENT)
Dept: ENDOCRINOLOGY | Age: 58
End: 2018-12-28

## 2018-12-28 VITALS
HEIGHT: 62 IN | SYSTOLIC BLOOD PRESSURE: 126 MMHG | BODY MASS INDEX: 41.77 KG/M2 | WEIGHT: 227 LBS | DIASTOLIC BLOOD PRESSURE: 72 MMHG

## 2018-12-28 DIAGNOSIS — E21.0 PRIMARY HYPERPARATHYROIDISM (HCC): ICD-10-CM

## 2018-12-28 DIAGNOSIS — E78.2 MIXED HYPERLIPIDEMIA: ICD-10-CM

## 2018-12-28 DIAGNOSIS — E66.01 MORBID OBESITY DUE TO EXCESS CALORIES (HCC): ICD-10-CM

## 2018-12-28 DIAGNOSIS — R73.03 PREDIABETES: ICD-10-CM

## 2018-12-28 DIAGNOSIS — R73.9 HYPERGLYCEMIA: ICD-10-CM

## 2018-12-28 DIAGNOSIS — I10 ESSENTIAL HYPERTENSION: ICD-10-CM

## 2018-12-28 DIAGNOSIS — E55.9 VITAMIN D DEFICIENCY: ICD-10-CM

## 2018-12-28 DIAGNOSIS — E03.8 HYPOTHYROIDISM DUE TO HASHIMOTO'S THYROIDITIS: Primary | ICD-10-CM

## 2018-12-28 DIAGNOSIS — E06.3 HYPOTHYROIDISM DUE TO HASHIMOTO'S THYROIDITIS: Primary | ICD-10-CM

## 2018-12-28 PROCEDURE — 99214 OFFICE O/P EST MOD 30 MIN: CPT | Performed by: NURSE PRACTITIONER

## 2018-12-28 NOTE — PROGRESS NOTES
"Subjective   Laura Cerda is a 58 y.o. female is here today for follow-up.  Chief Complaint   Patient presents with   • Hypothyroidism     recent labs   • hyperparathyroidism   • Vitamin D Deficiency     /72   Ht 157.5 cm (62\")   Wt 103 kg (227 lb)   BMI 41.52 kg/m²   Current Outpatient Medications on File Prior to Visit   Medication Sig   • acetaminophen (TYLENOL) 500 MG tablet Take 500 mg by mouth as needed for mild pain (1-3).   • atorvastatin (LIPITOR) 20 MG tablet Take 1 tablet by mouth Every Night. For cholesterol   • Ca Carbonate-Mag Hydroxide (ROLAIDS PO) Take  by mouth.   • Cholecalciferol (VITAMIN D) 2000 UNITS capsule Take  by mouth 2 (Two) Times a Day.   • CIMZIA PREFILLED 2 X 200 MG/ML kit injection    • DIFLUNISAL PO Take 500 mg by mouth daily.   • ezetimibe (ZETIA) 10 MG tablet TAKE ONE TABLET BY MOUTH DAILY   • fexofenadine (ALLEGRA) 180 MG tablet TAKE ONE TABLET BY MOUTH DAILY   • furosemide (LASIX) 40 MG tablet Take 1 tablet by mouth Daily. (Patient taking differently: Take 20 mg by mouth Daily.)   • levothyroxine (SYNTHROID, LEVOTHROID) 50 MCG tablet TAKE ONE TABLET BY MOUTH DAILY   • Magnesium 400 MG capsule Take 400 mg by mouth 2 (Two) Times a Day With Meals.   • milnacipran (SAVELLA) 50 MG tablet tablet Take  by mouth 2 (two) times a day.   • mometasone (NASONEX) 50 MCG/ACT nasal spray 2 sprays into each nostril Daily.   • Multiple Vitamins-Minerals (MULTIVITAMIN ADULT PO) Take  by mouth daily.   • Omega-3 Fatty Acids (FISH OIL) 1000 MG capsule capsule Take  by mouth daily with breakfast.   • pantoprazole (PROTONIX) 40 MG EC tablet Take 1 tablet by mouth Daily.   • potassium chloride (K-DUR,KLOR-CON) 20 MEQ CR tablet 1 by mouth 3 times a day (Patient taking differently: Take 20 mEq by mouth 2 (Two) Times a Day.)   • Secukinumab (COSENTYX SC) Inject  under the skin into the appropriate area as directed Every 30 (Thirty) Days.   • sucralfate (CARAFATE) 1 GM/10ML suspension Take 10 mL by " mouth 4 (Four) Times a Day.   • tiZANidine (ZANAFLEX) 2 MG tablet Take 2 mg by mouth At Night As Needed for Muscle Spasms.   • vitamin D (ERGOCALCIFEROL) 29325 units capsule capsule Take 1 capsule by mouth 1 (One) Time Per Week.     No current facility-administered medications on file prior to visit.      Family History   Problem Relation Age of Onset   • Cancer Mother         Breast   • Heart failure Mother    • Hypertension Mother    • Diabetes Mother    • Inflammatory bowel disease Mother    • Diverticulitis Mother    • Cancer Father         Colon   • Diabetes Father    • Arthritis Brother    • Heart disease Maternal Grandmother    • Stroke Maternal Grandmother    • Inflammatory bowel disease Maternal Grandmother    • Diverticulitis Maternal Grandmother    • Heart disease Maternal Grandfather    • Colon cancer Paternal Grandmother      Social History     Tobacco Use   • Smoking status: Never Smoker   • Smokeless tobacco: Never Used   Substance Use Topics   • Alcohol use: No   • Drug use: No     Allergies   Allergen Reactions   • Augmentin [Amoxicillin-Pot Clavulanate] Hives     Temperature high   • Azithromycin Rash   • Ceclor [Cefaclor] Rash   • Lyrica [Pregabalin] Swelling         History of Present Illness  Encounter Diagnoses   Name Primary?   • Mixed hyperlipidemia    • Essential hypertension    • Morbid obesity due to excess calories (CMS/HCC)    • Vitamin D deficiency    • Hypothyroidism due to Hashimoto's thyroiditis Yes   • Hyperglycemia    • Primary hyperparathyroidism (CMS/HCC)    58-year-old female patient here today for routine follow-up visit.  She is being seen for the above-mentioned problems.  She is considering diet exercise for weight loss.  She sees a rheumatologist who is offered classes to help her lose weight.  She has a history of autoimmune diseases and has been towed and anti-inflammatory diet would be beneficial.  She has a dog that she walks.  She is considering doing yoga or karoline chi.   She had recent labs which were reviewed and she was provided a copy.  She has no complaints at today's visit.  She has seen a nephrologist in the past for chronic kidney disease.  She states she does drink enough water.  She has a history of reflux was taken off her Protonix by her nephrologist however she will put back on it because she developed 3 ulcers which have since healed.  She has no complaints at today's visit.  We discussed the option of weight loss medication however she states her insurance will not cover it.    The following portions of the patient's history were reviewed and updated as appropriate: allergies, current medications, past family history, past medical history, past social history, past surgical history and problem list.    Review of Systems   Constitutional: Negative for fatigue.   HENT: Negative for trouble swallowing.    Eyes: Negative for visual disturbance.   Cardiovascular: Negative for leg swelling.   Endocrine: Negative for polyphagia.   Skin: Negative for wound.   Neurological: Negative for numbness.       Objective   Physical Exam   Constitutional: She is oriented to person, place, and time. She appears well-developed and well-nourished. No distress.   Morbidly obese   HENT:   Head: Normocephalic and atraumatic.   Right Ear: External ear normal.   Left Ear: External ear normal.   Nose: Nose normal.   Mouth/Throat: Oropharynx is clear and moist. No oropharyngeal exudate.   Eyes: Conjunctivae and EOM are normal. Pupils are equal, round, and reactive to light. Right eye exhibits no discharge. Left eye exhibits no discharge. No scleral icterus.   Neck: Normal range of motion. Neck supple. No JVD present. No tracheal deviation present. No thyromegaly present.   Cardiovascular: Normal rate, regular rhythm, normal heart sounds and intact distal pulses. Exam reveals no gallop and no friction rub.   No murmur heard.  Pulmonary/Chest: Effort normal and breath sounds normal. No stridor. No  respiratory distress. She has no wheezes. She has no rales. She exhibits no tenderness.   Abdominal: Soft. Bowel sounds are normal. She exhibits no distension and no mass. There is no tenderness. There is no rebound and no guarding. No hernia.   Musculoskeletal: Normal range of motion. She exhibits no edema, tenderness or deformity.   Lymphadenopathy:     She has no cervical adenopathy.   Neurological: She is alert and oriented to person, place, and time. She has normal reflexes. She displays normal reflexes. No cranial nerve deficit. She exhibits normal muscle tone. Coordination normal.   Skin: Skin is warm and dry. No rash noted. She is not diaphoretic. No erythema. No pallor.   Psychiatric: She has a normal mood and affect. Her behavior is normal. Judgment and thought content normal.   Vitals reviewed.      Results for orders placed or performed in visit on 12/05/18   Comprehensive Metabolic Panel   Result Value Ref Range    Glucose 110 (H) 65 - 99 mg/dL    BUN 10 6 - 20 mg/dL    Creatinine 0.89 0.57 - 1.00 mg/dL    Sodium 143 136 - 145 mmol/L    Potassium 3.8 3.5 - 5.2 mmol/L    Chloride 101 98 - 107 mmol/L    CO2 29.2 (H) 22.0 - 29.0 mmol/L    Calcium 9.9 8.6 - 10.5 mg/dL    Total Protein 7.7 6.0 - 8.5 g/dL    Albumin 4.10 3.50 - 5.20 g/dL    ALT (SGPT) 16 1 - 33 U/L    AST (SGOT) 19 1 - 32 U/L    Alkaline Phosphatase 119 (H) 39 - 117 U/L    Total Bilirubin 0.4 0.1 - 1.2 mg/dL    eGFR Non African Amer 65 >60 mL/min/1.73    Globulin 3.6 gm/dL    A/G Ratio 1.1 g/dL    BUN/Creatinine Ratio 11.2 7.0 - 25.0    Anion Gap 12.8 mmol/L   TSH   Result Value Ref Range    TSH 2.230 0.270 - 4.200 mIU/mL   Lipid Panel   Result Value Ref Range    Total Cholesterol 230 (H) 0 - 200 mg/dL    Triglycerides 140 0 - 150 mg/dL    HDL Cholesterol 62 (H) 40 - 60 mg/dL    LDL Cholesterol  140 (H) 0 - 100 mg/dL    VLDL Cholesterol 28 5 - 40 mg/dL    LDL/HDL Ratio 2.26    PTH, Intact   Result Value Ref Range    PTH, Intact 67.5 (H) 15.0  - 65.0 pg/mL   Hemoglobin A1c   Result Value Ref Range    Hemoglobin A1C 6.06 (H) 4.80 - 5.60 %   Vitamin D 25 Hydroxy   Result Value Ref Range    25 Hydroxy, Vitamin D 28.3 (L) 30.0 - 100.0 ng/ml   CBC Auto Differential   Result Value Ref Range    WBC 6.60 4.50 - 10.00 10*3/mm3    RBC 4.78 4.00 - 6.00 10*6/mm3    Hemoglobin 13.4 12.0 - 18.0 g/dL    Hematocrit 42.2 (H) 31.0 - 42.0 %    RDW 15.5 (H) 4.5 - 15.0 %    MCV 88.3 80.0 - 99.0 fL    MCH 28.1 26.1 - 33.1 pg    MCHC 31.8 (L) 33.0 - 37.0 g/dL    MPV 10.0 7.1 - 10.5 fL    Platelets 190 150 - 450 10*3/mm3    Neutrophil % 64.2 42.0 - 75.0 %    Lymphocyte % 29.5 21.0 - 51.0 %    Monocyte % 6.3 2.0 - 9.0 %    Neutrophils, Absolute 4.20 1.40 - 6.50 10*3/mm3    Lymphocytes, Absolute 1.90 1.20 - 3.40 10*3/mm3    Monocytes, Absolute 0.40 0.10 - 0.60 10*3/mm3   Urinalysis without microscopic (no culture) - Urine, Clean Catch   Result Value Ref Range    Color, UA Yellow Yellow, Straw    Appearance, UA Clear Clear    pH, UA 5.5 4.6 - 8.0    Specific Gravity, UA >=1.030 1.001 - 1.035    Glucose, UA Negative Negative    Ketones, UA Negative Negative    Bilirubin, UA Negative Negative    Blood, UA Negative Negative    Protein, UA Negative Negative    Leuk Esterase, UA Negative Negative    Nitrite, UA Negative Negative    Urobilinogen, UA 0.2 E.U./dL 0.2 - 1.0 E.U./dL   Urinalysis, Microscopic Only - Urine, Clean Catch   Result Value Ref Range    RBC, UA 0-2 (A) None Seen /HPF    WBC, UA None Seen None Seen /HPF    Bacteria, UA None Seen None Seen /HPF    Squamous Epithelial Cells, UA 13-20 (A) None Seen, 0-2 /HPF    Amorphous Crystals, UA Small/1+ None Seen /HPF    Methodology Manual Light Microscopy        Assessment/Plan   Encounter Diagnoses   Name Primary?   • Mixed hyperlipidemia    • Essential hypertension    • Morbid obesity due to excess calories (CMS/HCC)    • Vitamin D deficiency    • Hypothyroidism due to Hashimoto's thyroiditis Yes   • Hyperglycemia    • Primary  hyperparathyroidism (CMS/[facility])    • Prediabetes        In summary, patient was seen and examined.  Metabolically she is stable.  Her labs were reviewed and she was provided a copy.  She does not need medications refilled at today's visit.  Her medication list was reviewed and updated.  We discussed diet exercise for weight loss.  Her weight is stable according to her medical records.  She has no signs and symptoms of hypothyroidism.  She will follow-up with Dr. Castro her next visit with labs prior.  Her hemoglobin A1c reflects prediabetes.  Occasions were added or changed at today's visit.  We discussed treating prediabetes with metformin or GL P1 however she wants to wait so she sees a rheumatologist for diet instruction but may contact the office if she changes her mind.

## 2019-02-01 ENCOUNTER — APPOINTMENT (OUTPATIENT)
Dept: WOMENS IMAGING | Facility: HOSPITAL | Age: 59
End: 2019-02-01

## 2019-02-01 ENCOUNTER — PROCEDURE VISIT (OUTPATIENT)
Dept: OBSTETRICS AND GYNECOLOGY | Age: 59
End: 2019-02-01

## 2019-02-01 DIAGNOSIS — Z78.0 POST-MENOPAUSAL: Primary | ICD-10-CM

## 2019-02-01 DIAGNOSIS — Z12.31 VISIT FOR SCREENING MAMMOGRAM: Primary | ICD-10-CM

## 2019-02-01 PROCEDURE — 77080 DXA BONE DENSITY AXIAL: CPT | Performed by: OBSTETRICS & GYNECOLOGY

## 2019-02-01 PROCEDURE — 77067 SCR MAMMO BI INCL CAD: CPT | Performed by: OBSTETRICS & GYNECOLOGY

## 2019-02-01 PROCEDURE — 77067 SCR MAMMO BI INCL CAD: CPT | Performed by: RADIOLOGY

## 2019-02-05 ENCOUNTER — OFFICE VISIT (OUTPATIENT)
Dept: OBSTETRICS AND GYNECOLOGY | Age: 59
End: 2019-02-05

## 2019-02-05 VITALS
DIASTOLIC BLOOD PRESSURE: 86 MMHG | WEIGHT: 231 LBS | HEIGHT: 62 IN | BODY MASS INDEX: 42.51 KG/M2 | SYSTOLIC BLOOD PRESSURE: 130 MMHG

## 2019-02-05 DIAGNOSIS — Z11.51 SCREENING FOR HPV (HUMAN PAPILLOMAVIRUS): ICD-10-CM

## 2019-02-05 DIAGNOSIS — Z78.0 POST-MENOPAUSAL: ICD-10-CM

## 2019-02-05 DIAGNOSIS — Z01.419 WELL WOMAN EXAM WITH ROUTINE GYNECOLOGICAL EXAM: Primary | ICD-10-CM

## 2019-02-05 PROCEDURE — 99396 PREV VISIT EST AGE 40-64: CPT | Performed by: PHYSICIAN ASSISTANT

## 2019-02-05 NOTE — PROGRESS NOTES
Subjective     Chief Complaint   Patient presents with   • Gynecologic Exam     annual exam, last pap 2015 neg, m/g 2019, dexa 2019, c/scope 2018       History of Present Illness    Laura Cerda is a 59 y.o.  who presents for annual exam.    Here for annual  Doing well  Had her DEXA and mammogram done last year and they were wnl  Had CSC done last year and it was wnl  She was taken off protonix d/t kidney issues but then r/s after an EGD revealed 3 ulcers  She has had recent labs done which are wnl    No new med issues otherwise    Previous pt of dr Webber  Current pt of Dr Echevarria    Obstetric History:  OB History      Para Term  AB Living    2 2 2     2    SAB TAB Ectopic Molar Multiple Live Births              2         Menstrual History:     No LMP recorded. Patient is postmenopausal.         Current contraception: post menopausal status  History of abnormal Pap smear: no  Received Gardasil immunization: no  Perform regular self breast exam: yes - mthly  Family history of uterine or ovarian cancer: no  Family History of colon cancer: no  Family history of breast cancer: yes - Mom in her 70's    Mammogram: up to date.  Colonoscopy: utd-due in 5 years  DEXA: up to date.wnl, rpt in 3 years    Exercise: not active  Calcium/Vitamin D: adequate intake    The following portions of the patient's history were reviewed and updated as appropriate: allergies, current medications, past family history, past medical history, past social history, past surgical history and problem list.    Review of Systems   All other systems reviewed and are negative.      Review of Systems   Constitutional: Negative for fatigue.   Respiratory: Negative for shortness of breath.    Gastrointestinal: Negative for abdominal pain.   Genitourinary: Negative for dysuria.   Neurological: Negative for headaches.   Psychiatric/Behavioral: Negative for dysphoric mood.         Objective   Physical Exam    /86   " Ht 157.5 cm (62\")   Wt 105 kg (231 lb)   Breastfeeding? No   BMI 42.25 kg/m²     General:   alert, appears stated age and cooperative   Neck: no adenopathy and thyroid normal to palpation   Heart: regular rate and rhythm   Lungs: clear to auscultation bilaterally   Abdomen: soft and nontender   Breast: inspection negative, no nipple discharge or bleeding, no masses or nodularity palpable   Vulva: normal   Vagina: normal mucosa, normal discharge   Cervix: no lesions and dificult to clearly visualize, blind pap attempted   Uterus: normal size, non-tender   Adnexa: normal adnexa and no mass, fullness, tenderness   Rectal: not indicated     Assessment/Plan   Laura was seen today for gynecologic exam.    Diagnoses and all orders for this visit:    Well woman exam with routine gynecological exam  -     IGP, Apt HPV,rfx 16 / 18,45    Post-menopausal    Screening for HPV (human papillomavirus)  -     IGP, Apt HPV,rfx 16 / 18,45        All questions answered.  Breast self exam technique reviewed and patient encouraged to perform self-exam monthly.  Discussed healthy lifestyle modifications.  Recommended 30 minutes of aerobic exercise five times per week.  Discussed calcium needs to prevent osteoporosis.      Pap due today  Mammogram good/dexa good for 3  Years/CSC good for 5 years  utd on labs as well           "

## 2019-02-07 LAB
CYTOLOGIST CVX/VAG CYTO: NORMAL
CYTOLOGY CVX/VAG DOC THIN PREP: NORMAL
DX ICD CODE: NORMAL
HIV 1 & 2 AB SER-IMP: NORMAL
HPV I/H RISK 4 DNA CVX QL PROBE+SIG AMP: NEGATIVE
Lab: NORMAL
OTHER STN SPEC: NORMAL
PATH REPORT.FINAL DX SPEC: NORMAL
STAT OF ADQ CVX/VAG CYTO-IMP: NORMAL

## 2019-02-07 NOTE — PROGRESS NOTES
Normal Mammogram. Breasts with scattered areas of fibroglandular density.  No suspicious findings were significant change.  BI-RADS 1. Repeat in one year.

## 2019-03-08 RX ORDER — LEVOTHYROXINE SODIUM 0.05 MG/1
TABLET ORAL
Qty: 90 TABLET | Refills: 0 | Status: SHIPPED | OUTPATIENT
Start: 2019-03-08 | End: 2019-06-10 | Stop reason: SDUPTHER

## 2019-03-11 RX ORDER — EZETIMIBE 10 MG/1
10 TABLET ORAL DAILY
Qty: 90 TABLET | Refills: 0 | Status: SHIPPED | OUTPATIENT
Start: 2019-03-11 | End: 2019-08-05 | Stop reason: SDUPTHER

## 2019-04-08 RX ORDER — ERGOCALCIFEROL 1.25 MG/1
CAPSULE ORAL
Qty: 12 CAPSULE | Refills: 0 | OUTPATIENT
Start: 2019-04-08

## 2019-06-10 RX ORDER — LEVOTHYROXINE SODIUM 0.05 MG/1
TABLET ORAL
Qty: 90 TABLET | Refills: 0 | Status: SHIPPED | OUTPATIENT
Start: 2019-06-10 | End: 2019-08-05 | Stop reason: SDUPTHER

## 2019-07-22 ENCOUNTER — LAB (OUTPATIENT)
Dept: ENDOCRINOLOGY | Age: 59
End: 2019-07-22

## 2019-07-22 DIAGNOSIS — E06.3 HYPOTHYROIDISM DUE TO HASHIMOTO'S THYROIDITIS: ICD-10-CM

## 2019-07-22 DIAGNOSIS — R73.03 PREDIABETES: ICD-10-CM

## 2019-07-22 DIAGNOSIS — E21.0 PRIMARY HYPERPARATHYROIDISM (HCC): ICD-10-CM

## 2019-07-22 DIAGNOSIS — R73.9 HYPERGLYCEMIA: ICD-10-CM

## 2019-07-22 DIAGNOSIS — E78.2 MIXED HYPERLIPIDEMIA: Primary | ICD-10-CM

## 2019-07-22 DIAGNOSIS — R53.82 CHRONIC FATIGUE: ICD-10-CM

## 2019-07-22 DIAGNOSIS — E55.9 VITAMIN D DEFICIENCY: ICD-10-CM

## 2019-07-22 DIAGNOSIS — R79.89 ELEVATED PTHRP LEVEL: ICD-10-CM

## 2019-07-22 DIAGNOSIS — E03.8 HYPOTHYROIDISM DUE TO HASHIMOTO'S THYROIDITIS: ICD-10-CM

## 2019-07-22 DIAGNOSIS — E78.2 MIXED HYPERLIPIDEMIA: ICD-10-CM

## 2019-07-25 LAB
25(OH)D3+25(OH)D2 SERPL-MCNC: 33.4 NG/ML (ref 30–100)
ALBUMIN SERPL-MCNC: 3.9 G/DL (ref 3.5–5.2)
ALBUMIN/GLOB SERPL: 1.4 G/DL
ALP SERPL-CCNC: 119 U/L (ref 39–117)
ALT SERPL-CCNC: 16 U/L (ref 1–33)
AST SERPL-CCNC: 18 U/L (ref 1–32)
BILIRUB SERPL-MCNC: 0.4 MG/DL (ref 0.2–1.2)
BUN SERPL-MCNC: 14 MG/DL (ref 8–23)
BUN/CREAT SERPL: 14.7 (ref 7–25)
C PEPTIDE SERPL-MCNC: 4 NG/ML (ref 1.1–4.4)
CA-I SERPL ISE-MCNC: 5.3 MG/DL (ref 4.5–5.6)
CALCIUM SERPL-MCNC: 9.1 MG/DL (ref 8.6–10.5)
CHLORIDE SERPL-SCNC: 99 MMOL/L (ref 98–107)
CHOLEST SERPL-MCNC: 135 MG/DL (ref 0–200)
CO2 SERPL-SCNC: 28.3 MMOL/L (ref 22–29)
CREAT SERPL-MCNC: 0.95 MG/DL (ref 0.57–1)
GLOBULIN SER CALC-MCNC: 2.8 GM/DL
GLUCOSE SERPL-MCNC: 103 MG/DL (ref 65–99)
HBA1C MFR BLD: 6.2 % (ref 4.8–5.6)
HDLC SERPL-MCNC: 58 MG/DL (ref 40–60)
INTERPRETATION: NORMAL
LDLC SERPL CALC-MCNC: 58 MG/DL (ref 0–100)
Lab: NORMAL
PHOSPHATE SERPL-MCNC: 2.7 MG/DL (ref 2.5–4.5)
POTASSIUM SERPL-SCNC: 3.5 MMOL/L (ref 3.5–5.2)
PROT SERPL-MCNC: 6.7 G/DL (ref 6–8.5)
PTH-INTACT SERPL-MCNC: 49 PG/ML (ref 15–65)
SODIUM SERPL-SCNC: 144 MMOL/L (ref 136–145)
T3FREE SERPL-MCNC: 2.5 PG/ML (ref 2–4.4)
T4 FREE SERPL-MCNC: 1.36 NG/DL (ref 0.93–1.7)
T4 SERPL-MCNC: 6.71 MCG/DL (ref 4.5–11.7)
THYROGLOB AB SERPL-ACNC: <1 IU/ML
THYROGLOB SERPL-MCNC: 8.9 NG/ML
THYROGLOB SERPL-MCNC: NORMAL NG/ML
TRIGL SERPL-MCNC: 97 MG/DL (ref 0–150)
TSH SERPL DL<=0.005 MIU/L-ACNC: 2.18 MIU/ML (ref 0.27–4.2)
URATE SERPL-MCNC: 5.6 MG/DL (ref 2.4–5.7)
VLDLC SERPL CALC-MCNC: 19.4 MG/DL

## 2019-08-05 ENCOUNTER — OFFICE VISIT (OUTPATIENT)
Dept: ENDOCRINOLOGY | Age: 59
End: 2019-08-05

## 2019-08-05 VITALS
DIASTOLIC BLOOD PRESSURE: 88 MMHG | RESPIRATION RATE: 16 BRPM | BODY MASS INDEX: 43.17 KG/M2 | HEIGHT: 62 IN | SYSTOLIC BLOOD PRESSURE: 138 MMHG | WEIGHT: 234.6 LBS

## 2019-08-05 DIAGNOSIS — E06.3 HYPOTHYROIDISM DUE TO HASHIMOTO'S THYROIDITIS: Primary | ICD-10-CM

## 2019-08-05 DIAGNOSIS — E66.01 MORBID OBESITY DUE TO EXCESS CALORIES (HCC): ICD-10-CM

## 2019-08-05 DIAGNOSIS — R53.82 CHRONIC FATIGUE: ICD-10-CM

## 2019-08-05 DIAGNOSIS — E03.8 HYPOTHYROIDISM DUE TO HASHIMOTO'S THYROIDITIS: Primary | ICD-10-CM

## 2019-08-05 DIAGNOSIS — E21.0 PRIMARY HYPERPARATHYROIDISM (HCC): ICD-10-CM

## 2019-08-05 DIAGNOSIS — R73.9 HYPERGLYCEMIA: ICD-10-CM

## 2019-08-05 DIAGNOSIS — E55.9 VITAMIN D DEFICIENCY: ICD-10-CM

## 2019-08-05 DIAGNOSIS — R73.03 PREDIABETES: ICD-10-CM

## 2019-08-05 PROCEDURE — 99214 OFFICE O/P EST MOD 30 MIN: CPT | Performed by: INTERNAL MEDICINE

## 2019-08-05 RX ORDER — LEVOTHYROXINE SODIUM 0.05 MG/1
50 TABLET ORAL DAILY
Qty: 90 TABLET | Refills: 3 | Status: SHIPPED | OUTPATIENT
Start: 2019-08-05 | End: 2019-08-05

## 2019-08-05 RX ORDER — EZETIMIBE 10 MG/1
10 TABLET ORAL DAILY
Qty: 90 TABLET | Refills: 3 | Status: SHIPPED | OUTPATIENT
Start: 2019-08-05 | End: 2020-01-13 | Stop reason: ALTCHOICE

## 2019-08-05 RX ORDER — LEVOTHYROXINE SODIUM 75 UG/1
75 TABLET ORAL DAILY
Qty: 90 TABLET | Refills: 3 | Status: SHIPPED | OUTPATIENT
Start: 2019-08-05 | End: 2020-07-08 | Stop reason: SDUPTHER

## 2019-08-05 RX ORDER — ERGOCALCIFEROL 1.25 MG/1
50000 CAPSULE ORAL 2 TIMES WEEKLY
Qty: 26 CAPSULE | Refills: 3 | Status: SHIPPED | OUTPATIENT
Start: 2019-08-05 | End: 2020-09-09

## 2019-08-05 RX ORDER — ATORVASTATIN CALCIUM 20 MG/1
20 TABLET, FILM COATED ORAL NIGHTLY
Qty: 90 TABLET | Refills: 3 | Status: SHIPPED | OUTPATIENT
Start: 2019-08-05 | End: 2020-08-17

## 2019-08-05 NOTE — PROGRESS NOTES
"Subjective   Laura Cerda is a 59 y.o. female seen for follow up for hypothyroidism, vit d deficiency, hyperlipidemia, HTN, hyperparathyroidism, lab review. Patient denies any problems or concerns. She had additional labs done with her rheumatologist.     History of Present Illness this is a 59-year-old female known patient with hypothyroidism and dyslipidemia with vitamin D deficiency and hypertension with morbid obesity and hyperparathyroidism.  Over the course of last 6 months she has had no significant health problem for which to go to the ER or hospital.    /88   Resp 16   Ht 157.5 cm (62\")   Wt 106 kg (234 lb 9.6 oz)   BMI 42.91 kg/m²      Allergies   Allergen Reactions   • Augmentin [Amoxicillin-Pot Clavulanate] Hives     Temperature high   • Azithromycin Rash   • Ceclor [Cefaclor] Rash   • Lyrica [Pregabalin] Swelling       Current Outpatient Medications:   •  acetaminophen (TYLENOL) 500 MG tablet, Take 500 mg by mouth as needed for mild pain (1-3)., Disp: , Rfl:   •  atorvastatin (LIPITOR) 20 MG tablet, Take 1 tablet by mouth Every Night. For cholesterol, Disp: 90 tablet, Rfl: 3  •  ezetimibe (ZETIA) 10 MG tablet, Take 1 tablet by mouth Daily., Disp: 90 tablet, Rfl: 0  •  fexofenadine (ALLEGRA) 180 MG tablet, TAKE ONE TABLET BY MOUTH DAILY, Disp: 90 tablet, Rfl: 2  •  furosemide (LASIX) 40 MG tablet, Take 1 tablet by mouth Daily. (Patient taking differently: Take 20 mg by mouth Daily.), Disp: 90 tablet, Rfl: 1  •  levothyroxine (SYNTHROID, LEVOTHROID) 50 MCG tablet, TAKE ONE TABLET BY MOUTH DAILY, Disp: 90 tablet, Rfl: 0  •  Magnesium 400 MG capsule, Take 400 mg by mouth 2 (Two) Times a Day With Meals., Disp: 60 capsule, Rfl: 3  •  milnacipran (SAVELLA) 50 MG tablet tablet, Take  by mouth 2 (two) times a day., Disp: , Rfl:   •  mometasone (NASONEX) 50 MCG/ACT nasal spray, 2 sprays into each nostril Daily., Disp: , Rfl:   •  Multiple Vitamins-Minerals (MULTIVITAMIN ADULT PO), Take  by mouth daily., " Disp: , Rfl:   •  Omega-3 Fatty Acids (FISH OIL) 1000 MG capsule capsule, Take  by mouth daily with breakfast., Disp: , Rfl:   •  pantoprazole (PROTONIX) 40 MG EC tablet, Take 1 tablet by mouth Daily., Disp: 90 tablet, Rfl: 1  •  potassium chloride (K-DUR,KLOR-CON) 20 MEQ CR tablet, 1 by mouth 3 times a day (Patient taking differently: Take 20 mEq by mouth 2 (Two) Times a Day.), Disp: 90 tablet, Rfl: 3  •  Secukinumab (COSENTYX SC), Inject  under the skin into the appropriate area as directed Every 30 (Thirty) Days., Disp: , Rfl:   •  tiZANidine (ZANAFLEX) 2 MG tablet, Take 2 mg by mouth At Night As Needed for Muscle Spasms., Disp: , Rfl:   •  vitamin D (ERGOCALCIFEROL) 18390 units capsule capsule, Take 1 capsule by mouth 1 (One) Time Per Week., Disp: 12 capsule, Rfl: 0      The following portions of the patient's history were reviewed and updated as appropriate: allergies, current medications, past family history, past medical history, past social history, past surgical history and problem list.    Review of Systems   Constitutional: Negative.    HENT: Negative.    Eyes: Negative.    Respiratory: Negative.    Cardiovascular: Negative.    Gastrointestinal: Negative.    Endocrine: Negative.    Genitourinary: Negative.    Musculoskeletal: Negative.    Skin: Negative.    Allergic/Immunologic: Negative.    Neurological: Negative.    Hematological: Negative.    Psychiatric/Behavioral: Negative.        Objective   Physical Exam   Constitutional: She is oriented to person, place, and time. She appears well-developed and well-nourished. No distress.   Morbidly obese   HENT:   Head: Normocephalic and atraumatic.   Right Ear: External ear normal.   Left Ear: External ear normal.   Nose: Nose normal.   Mouth/Throat: Oropharynx is clear and moist. No oropharyngeal exudate.   Eyes: Conjunctivae and EOM are normal. Pupils are equal, round, and reactive to light. Right eye exhibits no discharge. Left eye exhibits no discharge. No  scleral icterus.   Neck: Normal range of motion. Neck supple. No JVD present. No tracheal deviation present. No thyromegaly present.   Cardiovascular: Normal rate, regular rhythm, normal heart sounds and intact distal pulses. Exam reveals no gallop and no friction rub.   No murmur heard.  Pulmonary/Chest: Effort normal and breath sounds normal. No stridor. No respiratory distress. She has no wheezes. She has no rales. She exhibits no tenderness.   Abdominal: Soft. Bowel sounds are normal. She exhibits no distension and no mass. There is no tenderness. There is no rebound and no guarding. No hernia.   Musculoskeletal: Normal range of motion. She exhibits no edema, tenderness or deformity.   Lymphadenopathy:     She has no cervical adenopathy.   Neurological: She is alert and oriented to person, place, and time. She has normal reflexes. She displays normal reflexes. No cranial nerve deficit or sensory deficit. She exhibits normal muscle tone. Coordination normal.   Skin: Skin is warm and dry. No rash noted. She is not diaphoretic. No erythema. No pallor.   Psychiatric: She has a normal mood and affect. Her behavior is normal. Judgment and thought content normal.   Vitals reviewed.       Results for orders placed or performed in visit on 07/22/19   Calcium, Ionized   Result Value Ref Range    Ionized Calcium 5.3 4.5 - 5.6 mg/dL   Phosphorus   Result Value Ref Range    Phosphorus 2.7 2.5 - 4.5 mg/dL   PTH, Intact   Result Value Ref Range    PTH, Intact 49 15 - 65 pg/mL   Vitamin D 25 Hydroxy   Result Value Ref Range    25 Hydroxy, Vitamin D 33.4 30.0 - 100.0 ng/ml   Uric Acid   Result Value Ref Range    Uric Acid 5.6 2.4 - 5.7 mg/dL   T4, Free   Result Value Ref Range    Free T4 1.36 0.93 - 1.70 ng/dL   T4 & TSH ([facility])   Result Value Ref Range    TSH 2.180 0.270 - 4.200 mIU/mL    T4, Total 6.71 4.50 - 11.70 mcg/dL   T3, Free   Result Value Ref Range    T3, Free 2.5 2.0 - 4.4 pg/mL   Lipid Panel   Result Value Ref  Range    Total Cholesterol 135 0 - 200 mg/dL    Triglycerides 97 0 - 150 mg/dL    HDL Cholesterol 58 40 - 60 mg/dL    VLDL Cholesterol 19.4 mg/dL    LDL Cholesterol  58 0 - 100 mg/dL   Hemoglobin A1c   Result Value Ref Range    Hemoglobin A1C 6.20 (H) 4.80 - 5.60 %   C-Peptide   Result Value Ref Range    C-Peptide 4.0 1.1 - 4.4 ng/mL   Comprehensive Thyroglobulin   Result Value Ref Range    Thyroglobulin Ab <1.0 IU/mL    Thyroglobulin 8.9 ng/mL    Thyroglobulin (TG-JOVANNI) Comment ng/mL   Comprehensive Metabolic Panel   Result Value Ref Range    Glucose 103 (H) 65 - 99 mg/dL    BUN 14 8 - 23 mg/dL    Creatinine 0.95 0.57 - 1.00 mg/dL    eGFR Non African Am 60 (L) >60 mL/min/1.73    eGFR African Am 73 >60 mL/min/1.73    BUN/Creatinine Ratio 14.7 7.0 - 25.0    Sodium 144 136 - 145 mmol/L    Potassium 3.5 3.5 - 5.2 mmol/L    Chloride 99 98 - 107 mmol/L    Total CO2 28.3 22.0 - 29.0 mmol/L    Calcium 9.1 8.6 - 10.5 mg/dL    Total Protein 6.7 6.0 - 8.5 g/dL    Albumin 3.90 3.50 - 5.20 g/dL    Globulin 2.8 gm/dL    A/G Ratio 1.4 g/dL    Total Bilirubin 0.4 0.2 - 1.2 mg/dL    Alkaline Phosphatase 119 (H) 39 - 117 U/L    AST (SGOT) 18 1 - 32 U/L    ALT (SGPT) 16 1 - 33 U/L   Cardiovascular Risk Assessment   Result Value Ref Range    Interpretation Note    Diabetes Patient Education   Result Value Ref Range    PDF Image Not applicable          Assessment/Plan   Diagnoses and all orders for this visit:    Hypothyroidism due to Hashimoto's thyroiditis  -     T4 & TSH ([facility]); Future  -     T3, Free; Future  -     T4, Free; Future  -     Uric Acid; Future  -     Vitamin D 25 Hydroxy; Future  -     Comprehensive Metabolic Panel; Future  -     C-Peptide; Future  -     Hemoglobin A1c; Future  -     Lipid Panel; Future  -     MicroAlbumin, Urine, Random - Urine, Clean Catch; Future    Primary hyperparathyroidism (CMS/HCC)  -     T4 & TSH ([facility]); Future  -     T3, Free; Future  -     T4, Free; Future  -     Uric Acid; Future  -      Vitamin D 25 Hydroxy; Future  -     Comprehensive Metabolic Panel; Future  -     C-Peptide; Future  -     Hemoglobin A1c; Future  -     Lipid Panel; Future  -     MicroAlbumin, Urine, Random - Urine, Clean Catch; Future    Hyperglycemia  -     T4 & TSH ([facility]); Future  -     T3, Free; Future  -     T4, Free; Future  -     Uric Acid; Future  -     Vitamin D 25 Hydroxy; Future  -     Comprehensive Metabolic Panel; Future  -     C-Peptide; Future  -     Hemoglobin A1c; Future  -     Lipid Panel; Future  -     MicroAlbumin, Urine, Random - Urine, Clean Catch; Future    Chronic fatigue  -     T4 & TSH ([facility]); Future  -     T3, Free; Future  -     T4, Free; Future  -     Uric Acid; Future  -     Vitamin D 25 Hydroxy; Future  -     Comprehensive Metabolic Panel; Future  -     C-Peptide; Future  -     Hemoglobin A1c; Future  -     Lipid Panel; Future  -     MicroAlbumin, Urine, Random - Urine, Clean Catch; Future    Prediabetes  -     T4 & TSH ([facility]); Future  -     T3, Free; Future  -     T4, Free; Future  -     Uric Acid; Future  -     Vitamin D 25 Hydroxy; Future  -     Comprehensive Metabolic Panel; Future  -     C-Peptide; Future  -     Hemoglobin A1c; Future  -     Lipid Panel; Future  -     MicroAlbumin, Urine, Random - Urine, Clean Catch; Future    Vitamin D deficiency  -     T4 & TSH ([facility]); Future  -     T3, Free; Future  -     T4, Free; Future  -     Uric Acid; Future  -     Vitamin D 25 Hydroxy; Future  -     Comprehensive Metabolic Panel; Future  -     C-Peptide; Future  -     Hemoglobin A1c; Future  -     Lipid Panel; Future  -     MicroAlbumin, Urine, Random - Urine, Clean Catch; Future    Morbid obesity due to excess calories (CMS/HCC)  -     T4 & TSH ([facility]); Future  -     T3, Free; Future  -     T4, Free; Future  -     Uric Acid; Future  -     Vitamin D 25 Hydroxy; Future  -     Comprehensive Metabolic Panel; Future  -     C-Peptide; Future  -     Hemoglobin A1c; Future  -     Lipid Panel;  Future  -     MicroAlbumin, Urine, Random - Urine, Clean Catch; Future    Other orders  -     vitamin D (ERGOCALCIFEROL) 40893 units capsule capsule; Take 1 capsule by mouth 2 (Two) Times a Week.  -     Discontinue: levothyroxine (SYNTHROID, LEVOTHROID) 50 MCG tablet; Take 1 tablet by mouth Daily.  -     ezetimibe (ZETIA) 10 MG tablet; Take 1 tablet by mouth Daily.  -     atorvastatin (LIPITOR) 20 MG tablet; Take 1 tablet by mouth Every Night. For cholesterol  -     UNITHROID 75 MCG tablet; Take 1 tablet by mouth Daily.      In the summary I saw and examined this 59-year-old female for above-mentioned problems.  I reviewed her laboratory evaluation of 7/22/2019 and provided her with a hard copy of it.  Overall she is clinically and metabolically stable and therefore we will go ahead and continue all her current prescriptions.  I virtue of the fact that her mother is having breast cancer she is really is not a candidate for estrogen replacement therapy.  She will see Ms. Hanna Monge in 6 months or sooner if needed with laboratory evaluation prior to each office visit.

## 2019-10-02 RX ORDER — PANTOPRAZOLE SODIUM 40 MG/1
TABLET, DELAYED RELEASE ORAL
Qty: 90 TABLET | Refills: 0 | Status: SHIPPED | OUTPATIENT
Start: 2019-10-02 | End: 2020-01-13 | Stop reason: SDUPTHER

## 2019-10-17 NOTE — TELEPHONE ENCOUNTER
[place] pharmacist informed tid #90 on K+ not 2-tid  Also pt informed tid today ,1 qd, recheck K+ Monday plus mag. Bid this is a joint effort between /kidney doc/.  
yes...

## 2019-12-23 RX ORDER — POTASSIUM CHLORIDE 1500 MG/1
TABLET, FILM COATED, EXTENDED RELEASE ORAL
Qty: 14 TABLET | Refills: 0 | Status: SHIPPED | OUTPATIENT
Start: 2019-12-23 | End: 2020-01-30

## 2020-01-08 RX ORDER — PANTOPRAZOLE SODIUM 40 MG/1
TABLET, DELAYED RELEASE ORAL
Qty: 90 TABLET | Refills: 0 | OUTPATIENT
Start: 2020-01-08

## 2020-01-10 RX ORDER — PANTOPRAZOLE SODIUM 40 MG/1
TABLET, DELAYED RELEASE ORAL
Qty: 90 TABLET | Refills: 0 | OUTPATIENT
Start: 2020-01-10

## 2020-01-13 ENCOUNTER — OFFICE VISIT (OUTPATIENT)
Dept: GASTROENTEROLOGY | Facility: CLINIC | Age: 60
End: 2020-01-13

## 2020-01-13 VITALS
BODY MASS INDEX: 43.43 KG/M2 | WEIGHT: 236 LBS | HEIGHT: 62 IN | SYSTOLIC BLOOD PRESSURE: 126 MMHG | DIASTOLIC BLOOD PRESSURE: 78 MMHG | TEMPERATURE: 98.5 F

## 2020-01-13 DIAGNOSIS — K22.10 ULCER OF ESOPHAGUS WITHOUT BLEEDING: ICD-10-CM

## 2020-01-13 DIAGNOSIS — K44.9 HIATAL HERNIA: ICD-10-CM

## 2020-01-13 DIAGNOSIS — K63.5 POLYP OF COLON, UNSPECIFIED PART OF COLON, UNSPECIFIED TYPE: ICD-10-CM

## 2020-01-13 DIAGNOSIS — K21.9 GASTROESOPHAGEAL REFLUX DISEASE, ESOPHAGITIS PRESENCE NOT SPECIFIED: Primary | ICD-10-CM

## 2020-01-13 PROCEDURE — 99214 OFFICE O/P EST MOD 30 MIN: CPT | Performed by: NURSE PRACTITIONER

## 2020-01-13 RX ORDER — PANTOPRAZOLE SODIUM 40 MG/1
40 TABLET, DELAYED RELEASE ORAL DAILY
Qty: 90 TABLET | Refills: 3 | Status: SHIPPED | OUTPATIENT
Start: 2020-01-13 | End: 2021-01-05 | Stop reason: SDUPTHER

## 2020-01-13 NOTE — PROGRESS NOTES
Chief Complaint   Patient presents with   • Follow-up   • Heartburn   • Med Refill       Laura Cerda is a  59 y.o. female here for a follow up visit for GERD.    HPI  59-year-old female presents today for follow-up visit for GERD.  She is a patient of Dr. Rodríguez.  She was last seen in the office on 2018.  She is a history of esophageal ulcer/small hiatal hernia/gastritis and GERD.  She admits she does well on Protonix 40 mg once daily.  She denies any breakthrough reflux at this time.  She denies any dysphagia, abdominal pain, nausea vomiting, diarrhea, constipation, rectal bleeding or melena.  Her appetite is good and her weight is stable.  Her last EGD and colonoscopy was done in .  She will be due for another EGD/colonoscopy in .  Past Medical History:   Diagnosis Date   • Arthritis     psoriatic   • Chest pain    • Chronic kidney disease    • Elevated PTHrP level    • Fatigue    • Fibromyalgia    • Fibromyalgia, primary    • Foot swelling    • GERD (gastroesophageal reflux disease)    • History of Holter monitoring 2016   • Hyperlipidemia    • Hyperparathyroidism (CMS/HCC)    • Hypertension    • Hypothyroidism    • Joint pain     worsening   • Meniere's disease    • Nausea    • Osteoarthritis    • Palpitations    • PONV (postoperative nausea and vomiting)    • Postmenopausal    • Psoriatic arthritis (CMS/HCC)    • Rapid heart rate    • Raynaud disease    • Vitamin D deficiency        Past Surgical History:   Procedure Laterality Date   • BREAST BIOPSY Right 2013    benign   • CATARACT EXTRACTION  17;17   •  SECTION     •  SECTION     • CHOLECYSTECTOMY     • COLONOSCOPY N/A 2018    Procedure: COLONOSCOPY INTO CECUM AND TERMINAL ILEUM WTIH COLD BIOPSIES;  Surgeon: Reji Rodríguez MD;  Location: [facility] ENDOSCOPY;  Service: Gastroenterology   • ENDOSCOPY N/A 2018    Procedure: ESOPHAGOGASTRODUODENOSCOPY WITH BIOPSIES;  Surgeon: Anne  Reji SINGH MD;  Location: [facility] ENDOSCOPY;  Service: Gastroenterology   • ENDOSCOPY N/A 11/30/2018    Procedure: ESOPHAGOGASTRODUODENOSCOPY;  Surgeon: Reji Rodríguez MD;  Location: [facility] ENDOSCOPY;  Service: Gastroenterology   • HEMORRHOIDECTOMY     • TUBAL ABDOMINAL LIGATION     • WISDOM TOOTH EXTRACTION         Scheduled Meds:    Continuous Infusions:  No current facility-administered medications for this visit.     PRN Meds:.    Allergies   Allergen Reactions   • Augmentin [Amoxicillin-Pot Clavulanate] Hives     Temperature high   • Azithromycin Rash   • Ceclor [Cefaclor] Rash   • Lyrica [Pregabalin] Swelling       Social History     Socioeconomic History   • Marital status:      Spouse name: Not on file   • Number of children: 2   • Years of education: Not on file   • Highest education level: Not on file   Tobacco Use   • Smoking status: Never Smoker   • Smokeless tobacco: Never Used   Substance and Sexual Activity   • Alcohol use: No   • Drug use: No   • Sexual activity: Yes     Partners: Male     Birth control/protection: Post-menopausal       Family History   Problem Relation Age of Onset   • Cancer Mother         Breast   • Heart failure Mother    • Hypertension Mother    • Diabetes Mother    • Inflammatory bowel disease Mother    • Diverticulitis Mother    • Cancer Father         Colon   • Diabetes Father    • Arthritis Brother    • Heart disease Maternal Grandmother    • Stroke Maternal Grandmother    • Inflammatory bowel disease Maternal Grandmother    • Diverticulitis Maternal Grandmother    • Heart disease Maternal Grandfather    • Colon cancer Paternal Grandmother        Review of Systems   Constitutional: Negative for appetite change, chills, diaphoresis, fatigue, fever and unexpected weight change.   HENT: Negative for nosebleeds, postnasal drip, sore throat, trouble swallowing and voice change.    Respiratory: Negative for cough, choking, chest tightness, shortness of breath and  wheezing.    Cardiovascular: Negative for chest pain, palpitations and leg swelling.   Gastrointestinal: Negative for abdominal distention, abdominal pain, anal bleeding, blood in stool, constipation, diarrhea, nausea, rectal pain and vomiting.   Endocrine: Negative for polydipsia, polyphagia and polyuria.   Musculoskeletal: Positive for arthralgias and back pain. Negative for gait problem and joint swelling.   Skin: Negative for rash and wound.   Allergic/Immunologic: Negative for food allergies.   Neurological: Negative for dizziness, speech difficulty and light-headedness.   Psychiatric/Behavioral: Negative for confusion, self-injury, sleep disturbance and suicidal ideas.       Vitals:    01/13/20 1344   BP: 126/78   Temp: 98.5 °F (36.9 °C)       Physical Exam   Constitutional: She is oriented to person, place, and time. She appears well-developed and well-nourished. She does not appear ill. No distress.   HENT:   Head: Normocephalic.   Eyes: Pupils are equal, round, and reactive to light.   Cardiovascular: Normal rate, regular rhythm and normal heart sounds.   Pulmonary/Chest: Effort normal and breath sounds normal.   Abdominal: Soft. Bowel sounds are normal. She exhibits no distension and no mass. There is no hepatosplenomegaly. There is no tenderness. There is no rebound and no guarding. No hernia.   Musculoskeletal: Normal range of motion.   Neurological: She is alert and oriented to person, place, and time.   Skin: Skin is warm and dry.   Psychiatric: She has a normal mood and affect. Her speech is normal and behavior is normal. Judgment normal.       No images are attached to the encounter.    Assessment and plan    1. Gastroesophageal reflux disease, esophagitis presence not specified    2. Ulcer of esophagus without bleeding    3. Polyp of colon, unspecified part of colon, unspecified type    4. Hiatal hernia    Reviewed scope results with her today.  GERD seems well controlled on Protonix 40 mg once  daily.  Continue GERD precautions.  Will refill it for 1 year.  Patient to follow-up in 1 year.  Patient will be due again for screening scopes on 8/2023.  Patient to call the office with any issues.

## 2020-01-15 ENCOUNTER — TELEPHONE (OUTPATIENT)
Dept: FAMILY MEDICINE CLINIC | Facility: CLINIC | Age: 60
End: 2020-01-15

## 2020-01-22 ENCOUNTER — OFFICE VISIT (OUTPATIENT)
Dept: FAMILY MEDICINE CLINIC | Facility: CLINIC | Age: 60
End: 2020-01-22

## 2020-01-22 VITALS
DIASTOLIC BLOOD PRESSURE: 88 MMHG | HEIGHT: 62 IN | HEART RATE: 63 BPM | BODY MASS INDEX: 44.39 KG/M2 | OXYGEN SATURATION: 95 % | WEIGHT: 241.2 LBS | SYSTOLIC BLOOD PRESSURE: 132 MMHG | TEMPERATURE: 97.7 F

## 2020-01-22 DIAGNOSIS — I10 ESSENTIAL HYPERTENSION: Primary | ICD-10-CM

## 2020-01-22 DIAGNOSIS — K21.00 GASTROESOPHAGEAL REFLUX DISEASE WITH ESOPHAGITIS: ICD-10-CM

## 2020-01-22 DIAGNOSIS — E78.00 PURE HYPERCHOLESTEROLEMIA: ICD-10-CM

## 2020-01-22 PROCEDURE — 99214 OFFICE O/P EST MOD 30 MIN: CPT | Performed by: INTERNAL MEDICINE

## 2020-01-22 NOTE — PATIENT INSTRUCTIONS
"DASH Eating Plan  DASH stands for \"Dietary Approaches to Stop Hypertension.\" The DASH eating plan is a healthy eating plan that has been shown to reduce high blood pressure (hypertension). It may also reduce your risk for type 2 diabetes, heart disease, and stroke. The DASH eating plan may also help with weight loss.  What are tips for following this plan?    General guidelines  · Avoid eating more than 2,300 mg (milligrams) of salt (sodium) a day. If you have hypertension, you may need to reduce your sodium intake to 1,500 mg a day.  · Limit alcohol intake to no more than 1 drink a day for nonpregnant women and 2 drinks a day for men. One drink equals 12 oz of beer, 5 oz of wine, or 1½ oz of hard liquor.  · Work with your health care provider to maintain a healthy body weight or to lose weight. Ask what an ideal weight is for you.  · Get at least 30 minutes of exercise that causes your heart to beat faster (aerobic exercise) most days of the week. Activities may include walking, swimming, or biking.  · Work with your health care provider or diet and nutrition specialist (dietitian) to adjust your eating plan to your individual calorie needs.  Reading food labels    · Check food labels for the amount of sodium per serving. Choose foods with less than 5 percent of the Daily Value of sodium. Generally, foods with less than 300 mg of sodium per serving fit into this eating plan.  · To find whole grains, look for the word \"whole\" as the first word in the ingredient list.  Shopping  · Buy products labeled as \"low-sodium\" or \"no salt added.\"  · Buy fresh foods. Avoid canned foods and premade or frozen meals.  Cooking  · Avoid adding salt when cooking. Use salt-free seasonings or herbs instead of table salt or sea salt. Check with your health care provider or pharmacist before using salt substitutes.  · Do not holliday foods. Cook foods using healthy methods such as baking, boiling, grilling, and broiling instead.  · Cook with " heart-healthy oils, such as olive, canola, soybean, or sunflower oil.  Meal planning  · Eat a balanced diet that includes:  ? 5 or more servings of fruits and vegetables each day. At each meal, try to fill half of your plate with fruits and vegetables.  ? Up to 6-8 servings of whole grains each day.  ? Less than 6 oz of lean meat, poultry, or fish each day. A 3-oz serving of meat is about the same size as a deck of cards. One egg equals 1 oz.  ? 2 servings of low-fat dairy each day.  ? A serving of nuts, seeds, or beans 5 times each week.  ? Heart-healthy fats. Healthy fats called Omega-3 fatty acids are found in foods such as flaxseeds and coldwater fish, like sardines, salmon, and mackerel.  · Limit how much you eat of the following:  ? Canned or prepackaged foods.  ? Food that is high in trans fat, such as fried foods.  ? Food that is high in saturated fat, such as fatty meat.  ? Sweets, desserts, sugary drinks, and other foods with added sugar.  ? Full-fat dairy products.  · Do not salt foods before eating.  · Try to eat at least 2 vegetarian meals each week.  · Eat more home-cooked food and less restaurant, buffet, and fast food.  · When eating at a restaurant, ask that your food be prepared with less salt or no salt, if possible.  What foods are recommended?  The items listed may not be a complete list. Talk with your dietitian about what dietary choices are best for you.  Grains  Whole-grain or whole-wheat bread. Whole-grain or whole-wheat pasta. Brown rice. Oatmeal. Quinoa. Bulgur. Whole-grain and low-sodium cereals. Siomara bread. Low-fat, low-sodium crackers. Whole-wheat flour tortillas.  Vegetables  Fresh or frozen vegetables (raw, steamed, roasted, or grilled). Low-sodium or reduced-sodium tomato and vegetable juice. Low-sodium or reduced-sodium tomato sauce and tomato paste. Low-sodium or reduced-sodium canned vegetables.  Fruits  All fresh, dried, or frozen fruit. Canned fruit in natural juice (without  added sugar).  Meat and other protein foods  Skinless chicken or turkey. Ground chicken or turkey. Pork with fat trimmed off. Fish and seafood. Egg whites. Dried beans, peas, or lentils. Unsalted nuts, nut butters, and seeds. Unsalted canned beans. Lean cuts of beef with fat trimmed off. Low-sodium, lean deli meat.  Dairy  Low-fat (1%) or fat-free (skim) milk. Fat-free, low-fat, or reduced-fat cheeses. Nonfat, low-sodium ricotta or cottage cheese. Low-fat or nonfat yogurt. Low-fat, low-sodium cheese.  Fats and oils  Soft margarine without trans fats. Vegetable oil. Low-fat, reduced-fat, or light mayonnaise and salad dressings (reduced-sodium). Canola, safflower, olive, soybean, and sunflower oils. Avocado.  Seasoning and other foods  Herbs. Spices. Seasoning mixes without salt. Unsalted popcorn and pretzels. Fat-free sweets.  What foods are not recommended?  The items listed may not be a complete list. Talk with your dietitian about what dietary choices are best for you.  Grains  Baked goods made with fat, such as croissants, muffins, or some breads. Dry pasta or rice meal packs.  Vegetables  Creamed or fried vegetables. Vegetables in a cheese sauce. Regular canned vegetables (not low-sodium or reduced-sodium). Regular canned tomato sauce and paste (not low-sodium or reduced-sodium). Regular tomato and vegetable juice (not low-sodium or reduced-sodium). Pickles. Olives.  Fruits  Canned fruit in a light or heavy syrup. Fried fruit. Fruit in cream or butter sauce.  Meat and other protein foods  Fatty cuts of meat. Ribs. Fried meat. [place]ppard. Sausage. Bologna and other processed lunch meats. Salami. Fatback. Hotdogs. Bratwurst. Salted nuts and seeds. Canned beans with added salt. Canned or smoked fish. Whole eggs or egg yolks. Chicken or turkey with skin.  Dairy  Whole or 2% milk, cream, and half-and-half. Whole or full-fat cream cheese. Whole-fat or sweetened yogurt. Full-fat cheese. Nondairy creamers. Whipped toppings.  Processed cheese and cheese spreads.  Fats and oils  Butter. Stick margarine. Lard. Shortening. Ghee. [place]ppard fat. Tropical oils, such as coconut, palm kernel, or palm oil.  Seasoning and other foods  Salted popcorn and pretzels. Onion salt, garlic salt, seasoned salt, table salt, and sea salt. Worcestershire sauce. Tartar sauce. Barbecue sauce. Teriyaki sauce. Soy sauce, including reduced-sodium. Steak sauce. Canned and packaged gravies. Fish sauce. Oyster sauce. Cocktail sauce. Horseradish that you find on the shelf. Ketchup. Mustard. Meat flavorings and tenderizers. Bouillon cubes. Hot sauce and Tabasco sauce. Premade or packaged marinades. Premade or packaged taco seasonings. Relishes. Regular salad dressings.  Where to find more information:  · National Heart, Lung, and Blood [employer]: www.nhlbi.nih.gov  · American Heart Association: www.heart.org  Summary  · The DASH eating plan is a healthy eating plan that has been shown to reduce high blood pressure (hypertension). It may also reduce your risk for type 2 diabetes, heart disease, and stroke.  · With the DASH eating plan, you should limit salt (sodium) intake to 2,300 mg a day. If you have hypertension, you may need to reduce your sodium intake to 1,500 mg a day.  · When on the DASH eating plan, aim to eat more fresh fruits and vegetables, whole grains, lean proteins, low-fat dairy, and heart-healthy fats.  · Work with your health care provider or diet and nutrition specialist (dietitian) to adjust your eating plan to your individual calorie needs.  This information is not intended to replace advice given to you by your health care provider. Make sure you discuss any questions you have with your health care provider.  Document Released: 12/06/2012 Document Revised: 12/11/2017 Document Reviewed: 12/11/2017  [employer] Interactive Patient Education © 2019 [employer]

## 2020-01-22 NOTE — PROGRESS NOTES
Subjective   Laura Cerda is a 59 y.o. female.     History of Present Illness   Patient was seen for hypertension.  Blood pressures running 130s over 80s.  Patient's lipids being treated with diet exercise and statin with omega-3.  Triglycerides 97, HDL 58, LDL 58.  Her hemoglobin A1c was 6.2% in July.  Patient did have labs drawn but is pending at this time.  Patient gastroesophageal reflux.  It is being treated with PPI.    Dictated utilizing Dragon dictation. If there are questions or for further clarification, please contact me.  The following portions of the patient's history were reviewed and updated as appropriate: allergies, current medications, past family history, past medical history, past social history, past surgical history and problem list.    Review of Systems   Constitutional: Negative for fatigue and fever.   HENT: Positive for congestion. Negative for trouble swallowing.    Eyes: Negative for discharge and visual disturbance.   Respiratory: Negative for choking and shortness of breath.    Cardiovascular: Negative for chest pain and palpitations.   Gastrointestinal: Positive for abdominal pain. Negative for blood in stool.   Endocrine: Negative.    Genitourinary: Negative for genital sores and hematuria.   Musculoskeletal: Negative for gait problem and joint swelling.   Skin: Negative for color change, pallor, rash and wound.   Allergic/Immunologic: Positive for environmental allergies. Negative for immunocompromised state.   Neurological: Negative for facial asymmetry and speech difficulty.   Psychiatric/Behavioral: Negative for hallucinations and suicidal ideas.       Objective   Physical Exam   Constitutional: She is oriented to person, place, and time. She appears well-developed and well-nourished.   HENT:   Head: Normocephalic.   Eyes: Pupils are equal, round, and reactive to light. Conjunctivae are normal.   Neck: Normal range of motion. Neck supple.   Cardiovascular: Normal rate, regular rhythm  and normal heart sounds. Exam reveals no gallop and no friction rub.   No murmur heard.  Pulmonary/Chest: Effort normal and breath sounds normal. No stridor. No respiratory distress. She has no wheezes. She has no rales. She exhibits no tenderness.   Abdominal: Soft. Bowel sounds are normal. She exhibits no distension and no mass. There is no tenderness. There is no rebound and no guarding. No hernia.   Musculoskeletal: Normal range of motion.   Neurological: She is alert and oriented to person, place, and time.   Skin: Skin is warm and dry.   Psychiatric: She has a normal mood and affect. Her behavior is normal. Judgment and thought content normal.   Nursing note and vitals reviewed.      Assessment/Plan   Laura was seen today for abnormal ecg.    Diagnoses and all orders for this visit:    Essential hypertension    Pure hypercholesterolemia    Gastroesophageal reflux disease with esophagitis

## 2020-01-24 ENCOUNTER — OFFICE VISIT (OUTPATIENT)
Dept: CARDIOLOGY | Facility: CLINIC | Age: 60
End: 2020-01-24

## 2020-01-24 ENCOUNTER — RESULTS ENCOUNTER (OUTPATIENT)
Dept: ENDOCRINOLOGY | Age: 60
End: 2020-01-24

## 2020-01-24 VITALS
HEART RATE: 113 BPM | HEIGHT: 62 IN | SYSTOLIC BLOOD PRESSURE: 128 MMHG | BODY MASS INDEX: 44.35 KG/M2 | WEIGHT: 241 LBS | RESPIRATION RATE: 16 BRPM | DIASTOLIC BLOOD PRESSURE: 90 MMHG | OXYGEN SATURATION: 99 %

## 2020-01-24 DIAGNOSIS — R53.82 CHRONIC FATIGUE: ICD-10-CM

## 2020-01-24 DIAGNOSIS — E06.3 HYPOTHYROIDISM DUE TO HASHIMOTO'S THYROIDITIS: ICD-10-CM

## 2020-01-24 DIAGNOSIS — E03.8 HYPOTHYROIDISM DUE TO HASHIMOTO'S THYROIDITIS: ICD-10-CM

## 2020-01-24 DIAGNOSIS — R73.9 HYPERGLYCEMIA: ICD-10-CM

## 2020-01-24 DIAGNOSIS — E21.0 PRIMARY HYPERPARATHYROIDISM (HCC): ICD-10-CM

## 2020-01-24 DIAGNOSIS — E66.01 MORBID OBESITY DUE TO EXCESS CALORIES (HCC): ICD-10-CM

## 2020-01-24 DIAGNOSIS — R73.03 PREDIABETES: ICD-10-CM

## 2020-01-24 DIAGNOSIS — E55.9 VITAMIN D DEFICIENCY: ICD-10-CM

## 2020-01-24 DIAGNOSIS — I10 ESSENTIAL HYPERTENSION: Primary | ICD-10-CM

## 2020-01-24 PROCEDURE — 99204 OFFICE O/P NEW MOD 45 MIN: CPT | Performed by: INTERNAL MEDICINE

## 2020-01-24 PROCEDURE — 93000 ELECTROCARDIOGRAM COMPLETE: CPT | Performed by: INTERNAL MEDICINE

## 2020-01-24 NOTE — PROGRESS NOTES
Subjective:     Encounter Date:01/24/2020      Patient ID: Laura Cerda is a 60 y.o. female.    Chief Complaint: Abnormal ECG/perioperative risk assessment.  Hypertension   This is a chronic problem. The problem is controlled.       This is a 59-year-old female who presents today for evaluation.  I had seen the patient back in 2016 that time she was having some lower extremity edema.  Ultimately her medicines were changed around and the edema has resolved.  She presents today for perioperative risk assessment.  She is going to have her right knee done.  In the preop testing she was found to have an abnormal ECG with poor R wave progression.  Patient has recurrent episodes of indigestion.  She also occasionally has some shortness of breath and still has edema but nothing like what she had before.  No the symptoms are really changed recently.      Review of Systems   All other systems reviewed and are negative.        ECG 12 Lead  Date/Time: 1/24/2020 2:04 PM  Performed by: Dennis Larkin MD  Authorized by: Dennis Larkin MD   Comparison: compared with previous ECG from 1/14/2020  Similar to previous ECG  Rhythm: sinus rhythm  Other findings: poor R wave progression    Clinical impression: non-specific ECG               Objective:     Physical Exam   Constitutional: She is oriented to person, place, and time. She appears well-developed.   HENT:   Head: Normocephalic.   Eyes: Conjunctivae are normal.   Neck: Normal range of motion.   Cardiovascular: Normal rate, regular rhythm and normal heart sounds.   Pulmonary/Chest: Breath sounds normal.   Abdominal: Soft. Bowel sounds are normal.   Musculoskeletal: Normal range of motion. She exhibits no edema.   Neurological: She is alert and oriented to person, place, and time.   Skin: Skin is warm and dry.   Psychiatric: She has a normal mood and affect. Her behavior is normal.   Vitals reviewed.      Lab Review:       Assessment:          Diagnosis Plan   1.  Essential hypertension            Plan:     1.  Hypertension blood pressure is good today  2.  Abnormal ECG and perioperative risk assessment.  Patient's ECG shows poor R wave progression.  I actually repeat her ECG today and it is not changed from what it was a week ago.  I was able to find an ECG back in 2016 when I saw her then and her ECG is exactly the same.  Patient had undergone a stress echo at that time she does not have signs of an anterior wall myocardial infarction.  In light of that she remains a low risk for a surgical intervention.  Would proceed with no further testing.

## 2020-01-30 RX ORDER — POTASSIUM CHLORIDE 1500 MG/1
TABLET, FILM COATED, EXTENDED RELEASE ORAL
Qty: 14 TABLET | Refills: 0 | Status: SHIPPED | OUTPATIENT
Start: 2020-01-30 | End: 2020-03-23

## 2020-02-11 ENCOUNTER — OFFICE VISIT (OUTPATIENT)
Dept: OBSTETRICS AND GYNECOLOGY | Age: 60
End: 2020-02-11

## 2020-02-11 ENCOUNTER — APPOINTMENT (OUTPATIENT)
Dept: WOMENS IMAGING | Facility: HOSPITAL | Age: 60
End: 2020-02-11

## 2020-02-11 ENCOUNTER — PROCEDURE VISIT (OUTPATIENT)
Dept: OBSTETRICS AND GYNECOLOGY | Age: 60
End: 2020-02-11

## 2020-02-11 VITALS
DIASTOLIC BLOOD PRESSURE: 72 MMHG | BODY MASS INDEX: 44.35 KG/M2 | SYSTOLIC BLOOD PRESSURE: 124 MMHG | WEIGHT: 241 LBS | HEIGHT: 62 IN

## 2020-02-11 DIAGNOSIS — Z12.31 VISIT FOR SCREENING MAMMOGRAM: Primary | ICD-10-CM

## 2020-02-11 DIAGNOSIS — Z01.419 WELL WOMAN EXAM WITH ROUTINE GYNECOLOGICAL EXAM: ICD-10-CM

## 2020-02-11 DIAGNOSIS — E66.01 MORBID OBESITY DUE TO EXCESS CALORIES (HCC): ICD-10-CM

## 2020-02-11 DIAGNOSIS — R30.0 DYSURIA: ICD-10-CM

## 2020-02-11 DIAGNOSIS — Z13.89 SCREENING FOR BLOOD OR PROTEIN IN URINE: Primary | ICD-10-CM

## 2020-02-11 LAB
BILIRUB BLD-MCNC: NEGATIVE MG/DL
CLARITY, POC: CLEAR
COLOR UR: YELLOW
DEVELOPER EXPIRATION DATE: NORMAL
DEVELOPER LOT NUMBER: NORMAL
EXPIRATION DATE: NORMAL
FECAL OCCULT BLOOD SCREEN, POC: NEGATIVE
GLUCOSE UR STRIP-MCNC: NEGATIVE MG/DL
KETONES UR QL: NEGATIVE
LEUKOCYTE EST, POC: NEGATIVE
Lab: 1981
NEGATIVE CONTROL: NEGATIVE
NITRITE UR-MCNC: NEGATIVE MG/ML
PH UR: 6.5 [PH] (ref 5–8)
POSITIVE CONTROL: POSITIVE
PROT UR STRIP-MCNC: NEGATIVE MG/DL
RBC # UR STRIP: NEGATIVE /UL
SP GR UR: 1.02 (ref 1–1.03)
UROBILINOGEN UR QL: NORMAL

## 2020-02-11 PROCEDURE — 77067 SCR MAMMO BI INCL CAD: CPT | Performed by: PHYSICIAN ASSISTANT

## 2020-02-11 PROCEDURE — 99396 PREV VISIT EST AGE 40-64: CPT | Performed by: PHYSICIAN ASSISTANT

## 2020-02-11 PROCEDURE — 77067 SCR MAMMO BI INCL CAD: CPT | Performed by: RADIOLOGY

## 2020-02-11 PROCEDURE — 81003 URINALYSIS AUTO W/O SCOPE: CPT | Performed by: PHYSICIAN ASSISTANT

## 2020-02-11 PROCEDURE — G0328 FECAL BLOOD SCRN IMMUNOASSAY: HCPCS | Performed by: PHYSICIAN ASSISTANT

## 2020-02-11 RX ORDER — NABUMETONE 750 MG/1
750 TABLET, FILM COATED ORAL 2 TIMES DAILY
COMMUNITY
End: 2021-01-08

## 2020-02-11 NOTE — PROGRESS NOTES
Subjective     Chief Complaint   Patient presents with   • Gynecologic Exam     annual exam last pap 2019 neg/neg, m/g today , dexa 10/2016 normal, c/scope 2018       History of Present Illness    Laura Cerda is a 60 y.o.  who presents for annual exam.    She is here for her annual exam  Had some pain and burning with urination last week  Was treated with macrobid through urgent care-sx's improved but still feels some discomfort  Left a urine sample today    Getting knee replacement done tomorrow, Dr Edwards  Right knee. Needed it for 8 years approx  Not able to be very active and keeps gaining weight    Pt of Dr Echevarria    Obstetric History:  OB History        2    Para   2    Term   2            AB        Living   2       SAB        TAB        Ectopic        Molar        Multiple        Live Births   2               Menstrual History:     No LMP recorded. Patient is postmenopausal.         Current contraception: post menopausal status  History of abnormal Pap smear: no  Received Gardasil immunization: no  Perform regular self breast exam: yes - mthly  Family history of uterine or ovarian cancer: no  Family History of colon cancer: no  Family history of breast cancer: no    Mammogram: done today.  Colonoscopy: up to date.  DEXA: up to date.    Exercise: not active  Calcium/Vitamin D: adequate intake    The following portions of the patient's history were reviewed and updated as appropriate: allergies, current medications, past family history, past medical history, past social history, past surgical history and problem list.    Review of Systems   Genitourinary: Positive for dysuria.   Musculoskeletal: Positive for arthralgias and joint swelling.   All other systems reviewed and are negative.      Review of Systems   Constitutional: Negative for fatigue.   Respiratory: Negative for shortness of breath.    Gastrointestinal: Negative for abdominal pain.   Genitourinary: Negative for dysuria.  "  Neurological: Negative for headaches.   Psychiatric/Behavioral: Negative for dysphoric mood.         Objective   Physical Exam    /72   Ht 157.5 cm (62\")   Wt 109 kg (241 lb)   Breastfeeding No   BMI 44.08 kg/m²   General:   alert, comfortable and no distress   Heart: regular rate and rhythm   Lungs: clear to auscultation bilaterally   Breast: normal appearance, no masses or tenderness, Inspection negative, No nipple retraction or dimpling, No nipple discharge or bleeding, No axillary or supraclavicular adenopathy   Neck: no adenopathy and no carotid bruit   Abdomen: {normal findings: soft, non-tender   CVA: Not performed today   Pelvis: External genitalia: normal general appearance  Vaginal: normal mucosa without prolapse or lesions  Cervix: normal appearance  Adnexa: normal bimanual exam  Uterus: normal single, nontender  Rectal: good sphincter tone, no masses, guaiac negative   Extremities: Not performed today   Neurologic: negative   Psychiatric: Normal affect, judgement, and mood     Assessment/Plan   Laura was seen today for gynecologic exam.    Diagnoses and all orders for this visit:    Screening for blood or protein in urine  -     POC Urinalysis Dipstick, Multipro    Dysuria  -     Urine Culture - , Urine, Clean Catch  -     Cancel: POC Urinalysis Dipstick        All questions answered.  Breast self exam technique reviewed and patient encouraged to perform self-exam monthly.  Discussed healthy lifestyle modifications.  Recommended 30 minutes of aerobic exercise five times per week.  Discussed calcium needs to prevent osteoporosis.      Pap utd  Mammogram done today  Send urine culture and await results prior to treating. Enc good hydration as well                 "

## 2020-02-14 LAB
BACTERIA UR CULT: NO GROWTH
BACTERIA UR CULT: NORMAL

## 2020-02-24 ENCOUNTER — TELEPHONE (OUTPATIENT)
Dept: FAMILY MEDICINE CLINIC | Facility: CLINIC | Age: 60
End: 2020-02-24

## 2020-02-24 NOTE — TELEPHONE ENCOUNTER
Cont to monitor, make sure she has cardiology f/u, can forward to Dr. Maldonado, he may want her to f/u in office

## 2020-02-24 NOTE — TELEPHONE ENCOUNTER
Left message continue to monitor patient needs to F/U with Cardiology will check with Dr Maldonado tomorrow .

## 2020-02-25 NOTE — TELEPHONE ENCOUNTER
Left message patient needs to keep track of Blood Pressure and follow up with an appointment with Dr Maldonado in 2 weeks with Blood Pressure numbers.

## 2020-03-23 RX ORDER — POTASSIUM CHLORIDE 20 MEQ/1
TABLET, EXTENDED RELEASE ORAL
Qty: 14 TABLET | Refills: 0 | Status: SHIPPED | OUTPATIENT
Start: 2020-03-23 | End: 2020-06-11 | Stop reason: SDUPTHER

## 2020-06-12 RX ORDER — POTASSIUM CHLORIDE 20 MEQ/1
TABLET, EXTENDED RELEASE ORAL
Qty: 14 TABLET | Refills: 0 | Status: SHIPPED | OUTPATIENT
Start: 2020-06-12 | End: 2020-08-31

## 2020-06-25 LAB
25(OH)D3+25(OH)D2 SERPL-MCNC: 35.4 NG/ML (ref 30–100)
ALBUMIN SERPL-MCNC: 4.1 G/DL (ref 3.5–5.2)
ALBUMIN/GLOB SERPL: 1.5 G/DL
ALP SERPL-CCNC: 126 U/L (ref 39–117)
ALT SERPL-CCNC: 24 U/L (ref 1–33)
AST SERPL-CCNC: 19 U/L (ref 1–32)
BILIRUB SERPL-MCNC: 0.5 MG/DL (ref 0.2–1.2)
BUN SERPL-MCNC: 14 MG/DL (ref 8–23)
BUN/CREAT SERPL: 15.2 (ref 7–25)
C PEPTIDE SERPL-MCNC: 3.9 NG/ML (ref 1.1–4.4)
CALCIUM SERPL-MCNC: 10 MG/DL (ref 8.6–10.5)
CHLORIDE SERPL-SCNC: 101 MMOL/L (ref 98–107)
CHOLEST SERPL-MCNC: 209 MG/DL (ref 0–200)
CO2 SERPL-SCNC: 27.1 MMOL/L (ref 22–29)
CREAT SERPL-MCNC: 0.92 MG/DL (ref 0.57–1)
GLOBULIN SER CALC-MCNC: 2.7 GM/DL
GLUCOSE SERPL-MCNC: 115 MG/DL (ref 65–99)
HBA1C MFR BLD: 6.6 % (ref 4.8–5.6)
HDLC SERPL-MCNC: 55 MG/DL (ref 40–60)
INTERPRETATION: NORMAL
LDLC SERPL CALC-MCNC: 110 MG/DL (ref 0–100)
Lab: NORMAL
MICROALBUMIN UR-MCNC: 5 UG/ML
POTASSIUM SERPL-SCNC: 4.4 MMOL/L (ref 3.5–5.2)
PROT SERPL-MCNC: 6.8 G/DL (ref 6–8.5)
SODIUM SERPL-SCNC: 140 MMOL/L (ref 136–145)
T3FREE SERPL-MCNC: 2.5 PG/ML (ref 2–4.4)
T4 FREE SERPL-MCNC: 1.51 NG/DL (ref 0.93–1.7)
T4 SERPL-MCNC: 7.56 MCG/DL (ref 4.5–11.7)
TRIGL SERPL-MCNC: 220 MG/DL (ref 0–150)
TSH SERPL DL<=0.005 MIU/L-ACNC: 2.42 UIU/ML (ref 0.27–4.2)
URATE SERPL-MCNC: 9.6 MG/DL (ref 2.4–5.7)
VLDLC SERPL CALC-MCNC: 44 MG/DL

## 2020-07-08 ENCOUNTER — OFFICE VISIT (OUTPATIENT)
Dept: ENDOCRINOLOGY | Age: 60
End: 2020-07-08

## 2020-07-08 VITALS
SYSTOLIC BLOOD PRESSURE: 120 MMHG | HEIGHT: 62 IN | DIASTOLIC BLOOD PRESSURE: 60 MMHG | BODY MASS INDEX: 44.72 KG/M2 | WEIGHT: 243 LBS

## 2020-07-08 DIAGNOSIS — E21.0 PRIMARY HYPERPARATHYROIDISM (HCC): ICD-10-CM

## 2020-07-08 DIAGNOSIS — E11.8 CONTROLLED TYPE 2 DIABETES MELLITUS WITH COMPLICATION, WITHOUT LONG-TERM CURRENT USE OF INSULIN (HCC): Primary | ICD-10-CM

## 2020-07-08 DIAGNOSIS — R73.9 HYPERGLYCEMIA: ICD-10-CM

## 2020-07-08 DIAGNOSIS — E78.00 PURE HYPERCHOLESTEROLEMIA: ICD-10-CM

## 2020-07-08 DIAGNOSIS — E06.3 HYPOTHYROIDISM DUE TO HASHIMOTO'S THYROIDITIS: ICD-10-CM

## 2020-07-08 DIAGNOSIS — E55.9 VITAMIN D DEFICIENCY: ICD-10-CM

## 2020-07-08 DIAGNOSIS — E03.8 HYPOTHYROIDISM DUE TO HASHIMOTO'S THYROIDITIS: ICD-10-CM

## 2020-07-08 DIAGNOSIS — I10 ESSENTIAL HYPERTENSION: ICD-10-CM

## 2020-07-08 DIAGNOSIS — E79.0 HYPERURICEMIA: ICD-10-CM

## 2020-07-08 PROCEDURE — 99214 OFFICE O/P EST MOD 30 MIN: CPT | Performed by: NURSE PRACTITIONER

## 2020-07-08 RX ORDER — LEVOTHYROXINE SODIUM 75 UG/1
75 TABLET ORAL DAILY
Qty: 90 TABLET | Refills: 1 | Status: SHIPPED | OUTPATIENT
Start: 2020-07-08 | End: 2021-01-08 | Stop reason: SDUPTHER

## 2020-07-08 NOTE — PATIENT INSTRUCTIONS
Metformin 500 mg 1 pill twice daily with food  Diet and exercise for wt loss    Low-Purine Eating Plan  A low-purine eating plan involves making food choices to limit your intake of purine. Purine is a kind of uric acid. Too much uric acid in your blood can cause certain conditions, such as gout and kidney stones. Eating a low-purine diet can help control these conditions.  What are tips for following this plan?  Reading food labels    · Avoid foods with saturated or Trans fat.  · Check the ingredient list of grains-based foods, such as bread and cereal, to make sure that they contain whole grains.  · Check the ingredient list of sauces or soups to make sure they do not contain meat or fish.  · When choosing soft drinks, check the ingredient list to make sure they do not contain high-fructose corn syrup.  Shopping  · Buy plenty of fresh fruits and vegetables.  · Avoid buying canned or fresh fish.  · Buy dairy products labeled as low-fat or nonfat.  · Avoid buying premade or processed foods. These foods are often high in fat, salt (sodium), and added sugar.  Cooking  · Use olive oil instead of butter when cooking. Oils like olive oil, canola oil, and sunflower oil contain healthy fats.  Meal planning  · Learn which foods do or do not affect you. If you find out that a food tends to cause your gout symptoms to flare up, avoid eating that food. You can enjoy foods that do not cause problems. If you have any questions about a food item, talk with your dietitian or health care provider.  · Limit foods high in fat, especially saturated fat. Fat makes it harder for your body to get rid of uric acid.  · Choose foods that are lower in fat and are lean sources of protein.  General guidelines  · Limit alcohol intake to no more than 1 drink a day for nonpregnant women and 2 drinks a day for men. One drink equals 12 oz of beer, 5 oz of wine, or 1½ oz of hard liquor. Alcohol can affect the way your body gets rid of uric  acid.  · Drink plenty of water to keep your urine clear or pale yellow. Fluids can help remove uric acid from your body.  · If directed by your health care provider, take a vitamin C supplement.  · Work with your health care provider and dietitian to develop a plan to achieve or maintain a healthy weight. Losing weight can help reduce uric acid in your blood.  What foods are recommended?  The items listed may not be a complete list. Talk with your dietitian about what dietary choices are best for you.  Foods low in purines  Foods low in purines do not need to be limited. These include:  · All fruits.  · All low-purine vegetables, pickles, and olives.  · Breads, pasta, rice, cornbread, and popcorn. Cake and other baked goods.  · All dairy foods.  · Eggs, nuts, and nut butters.  · Spices and condiments, such as salt, herbs, and vinegar.  · Plant oils, butter, and margarine.  · Water, sugar-free soft drinks, tea, coffee, and cocoa.  · Vegetable-based soups, broths, sauces, and gravies.  Foods moderate in purines  Foods moderate in purines should be limited to the amounts listed.  · ½ cup of asparagus, cauliflower, spinach, mushrooms, or green peas, each day.  · 2/3 cup uncooked oatmeal, each day.  · ¼ cup dry wheat bran or wheat germ, each day.  · 2-3 ounces of meat or poultry, each day.  · 4-6 ounces of shellfish, such as crab, lobster, oysters, or shrimp, each day.  · 1 cup cooked beans, peas, or lentils, each day.  · Soup, broths, or bouillon made from meat or fish. Limit these foods as much as possible.  What foods are not recommended?  The items listed may not be a complete list. Talk with your dietitian about what dietary choices are best for you.  Limit your intake of foods high in purines, including:  · Beer and other alcohol.  · Meat-based gravy or sauce.  · Canned or fresh fish, such as:  ? Anchovies, sardines, herring, and tuna.  ? Mussels and scallops.  ? Codfish, trout, and tracy.  · Sheppard.  · Organ  meats, such as:  ? Liver or kidney.  ? Tripe.  ? Sweetbreads (thymus gland or pancreas).  · Wild game or goose.  · Yeast or yeast extract supplements.  · Drinks sweetened with high-fructose corn syrup.  Summary  · Eating a low-purine diet can help control conditions caused by too much uric acid in the body, such as gout or kidney stones.  · Choose low-purine foods, limit alcohol, and limit foods high in fat.  · You will learn over time which foods do or do not affect you. If you find out that a food tends to cause your gout symptoms to flare up, avoid eating that food.  This information is not intended to replace advice given to you by your health care provider. Make sure you discuss any questions you have with your health care provider.  Document Released: 04/13/2012 Document Revised: 11/30/2018 Document Reviewed: 01/31/2018  Elsevier Patient Education © 2020 [employer]    Uric Acid Nephropathy    Uric acid is a chemical compound that is made when your body digests some kinds of food and also when your body breaks down dead cells. It is a waste product that is normally removed from your body by your kidneys. If you have too much uric acid in your blood, it can build up in your kidneys and cause damage (nephropathy).  There are two types of uric acid nephropathy:  · Sudden (acute) uric acid nephropathy results from a sudden buildup of uric acid.  · Long-term (chronic) uric acid nephropathy results from a slow buildup of uric acid over a long period of time.  What are the causes?  The exact cause of this condition may depend on the type of uric acid nephropathy:  · Acute uric acid nephropathy may be caused by:  ? Receiving medicines for the treatment of cancer (chemotherapy). Use of these medicines causes rapid breakdown of cells. The cell breakdown produces excess uric acid. As uric acid builds up in your kidneys, it causes an increase of pressure and a loss of blood supply. This makes your kidneys less able to  filter blood and make urine.  ? A tumor (cancer) in the body.  ? Seizures.  ? Taking medicines that can cause excess uric acid. Examples are aspirin, water pills (diuretics), and medicines that are prescribed after an organ transplant.  ? Severe diarrhea, which causes fluid loss (dehydration).  · Chronic uric acid nephropathy may happen if you have high levels of uric acid in your body on a regular basis. One reason you may have high levels of uric acid is gout. With gout, excess uric acid forms into crystals. These crystals can get stuck inside joints and cause painful swelling. They may also build up in your kidneys and cause long-term damage.  What increases the risk?  You may be more likely to develop this condition if you:  · Are male.  · Are 50 years old or older.  · Have gout.  · Eat a lot of foods that are high in certain natural chemical compounds (purines). Shellfish and red meat contain a lot of purines.  · Drink alcohol.  · Have recently had heart surgery.  What are the signs or symptoms?  Signs and symptoms depend on the type of nephropathy that you have. They may include:  · Decreased urine output.  · Nausea and vomiting.  · Lack of energy.  · Seizures.  · Blood-tinged urine.  · Pain when passing urine.  · Pain in the sides of the lower back (flank pain).  In some cases, there are no symptoms.  How is this diagnosed?  Your health care provider may suspect uric acid nephropathy from your signs and symptoms, especially if you have gout. He or she may:  · Do a physical exam.  · Order tests to confirm the diagnosis. Tests may include:  ? Blood and urine tests. This is the best way to measure high levels of uric acid.  ? Imaging studies to check for kidney stones or kidney damage. These may include:  § X-rays.  § Ultrasound.  § CT scan.  § MRI.  How is this treated?  The goal of treatment is to lower the level of uric acid in your body and prevent kidney damage. This can be done by:  · Taking medicines that  block the production of uric acid. The most commonly used medicine is allopurinol. If you are starting chemotherapy, ask your health care provider if you should start taking a medicine to prevent high uric acid.  · Starting a diet plan that lowers your intake of purines. Work with a diet and nutrition specialist (dietitian) to limit your intake of foods and drinks that increase uric acid.  · Preventing uric acid buildup. Drink plenty of water to maintain a good flow of urine and to lower the acidity of your urine. You may also need to take a medicine called bicarbonate.  · Resting the kidneys. This can be done by using a machine to clean your blood (hemodialysis), if necessary. In hemodialysis, your blood is removed, passed through a filtering machine, and then returned to your body. Several sessions of hemodialysis usually improve kidney function by removing uric acid.  Follow these instructions at home:  Eating and drinking         · Drink enough fluid to keep your urine pale yellow.  · Do not drink alcohol.  · Do not drink beverages that contain a type of sugar called fructose.  · Limit how much red meat and shellfish you eat.  · Include plenty of low-fat dairy foods in your diet.  General instructions  · Take over-the-counter and prescription medicines only as told by your health care provider.  · Maintain a healthy weight. Lose weight as directed by your health care provider.  · Keep all follow-up visits as told by your health care provider. This is important.  Contact a health care provider if you:  · Feel tired and have low energy, even when you get enough sleep.  · Have pain when passing urine.  · Have nausea or vomiting.  Get help right away if you:  · Produce very little urine, even when you drink enough fluids.  · Have blood in your urine.  · Have a seizure.  Summary  · Uric acid is a waste product that is normally removed from your body by your kidneys. If you have too much uric acid in your blood, it  can build up in your kidneys and cause damage (nephropathy).  · Sudden (acute) uric acid nephropathy results from a sudden buildup of uric acid. Long-term (chronic) uric acid nephropathy results from a slow buildup of uric acid over a long period of time. This may happen if you have gout.  · The goal of treatment is to lower the level of uric acid in your body and prevent kidney damage.  This information is not intended to replace advice given to you by your health care provider. Make sure you discuss any questions you have with your health care provider.  Document Released: 10/14/2008 Document Revised: 04/07/2020 Document Reviewed: 01/08/2019  Elsevier Patient Education © 2020 [employer]vier Inc.

## 2020-07-08 NOTE — PROGRESS NOTES
"Subjective   Laura Cerda is a 60 y.o. female is here today for follow-up.  Chief Complaint   Patient presents with   • Hyperthyroidism     recent labs   • Hyperlipidemia   • Hypertension   • Vitamin D Deficiency     /60   Ht 157.5 cm (62.01\")   Wt 110 kg (243 lb)   BMI 44.43 kg/m²   Current Outpatient Medications on File Prior to Visit   Medication Sig   • acetaminophen (TYLENOL) 500 MG tablet Take 500 mg by mouth as needed for mild pain (1-3).   • atorvastatin (LIPITOR) 20 MG tablet Take 1 tablet by mouth Every Night. For cholesterol   • fexofenadine (ALLEGRA) 180 MG tablet TAKE ONE TABLET BY MOUTH DAILY (Patient taking differently: Take 180 mg by mouth Daily As Needed.)   • furosemide (LASIX) 40 MG tablet Take 1 tablet by mouth Daily. (Patient taking differently: Take 20 mg by mouth Daily.)   • milnacipran (SAVELLA) 50 MG tablet tablet Take  by mouth 2 (two) times a day.   • mometasone (NASONEX) 50 MCG/ACT nasal spray 2 sprays into each nostril Daily.   • Multiple Vitamins-Minerals (MULTIVITAMIN ADULT PO) Take  by mouth daily.   • nabumetone (RELAFEN) 750 MG tablet Take 750 mg by mouth 2 (Two) Times a Day.   • Omega-3 Fatty Acids (FISH OIL) 1000 MG capsule capsule Take  by mouth daily with breakfast.   • pantoprazole (PROTONIX) 40 MG EC tablet Take 1 tablet by mouth Daily.   • potassium chloride (K-DUR,KLOR-CON) 20 MEQ CR tablet TAKE ONE TABLET BY MOUTH THREE TIMES A DAY   • Secukinumab (COSENTYX SC) Inject  under the skin into the appropriate area as directed Every 30 (Thirty) Days.   • tiZANidine (ZANAFLEX) 2 MG tablet Take 2 mg by mouth At Night As Needed for Muscle Spasms.   • UNITHROID 75 MCG tablet Take 1 tablet by mouth Daily.   • vitamin D (ERGOCALCIFEROL) 13810 units capsule capsule Take 1 capsule by mouth 2 (Two) Times a Week.     No current facility-administered medications on file prior to visit.      Family History   Problem Relation Age of Onset   • Cancer Mother         Breast   • Heart " failure Mother    • Hypertension Mother    • Diabetes Mother    • Inflammatory bowel disease Mother    • Diverticulitis Mother    • Cancer Father         Colon   • Diabetes Father    • Arthritis Brother    • Heart disease Maternal Grandmother    • Stroke Maternal Grandmother    • Inflammatory bowel disease Maternal Grandmother    • Diverticulitis Maternal Grandmother    • Heart disease Maternal Grandfather    • Colon cancer Paternal Grandmother      Social History     Tobacco Use   • Smoking status: Never Smoker   • Smokeless tobacco: Never Used   Substance Use Topics   • Alcohol use: No   • Drug use: No     Allergies   Allergen Reactions   • Augmentin [Amoxicillin-Pot Clavulanate] Hives     Temperature high   • Azithromycin Rash   • Ceclor [Cefaclor] Rash   • Lyrica [Pregabalin] Swelling         History of Present Illness   Encounter Diagnoses   Name Primary?   • Pure hypercholesterolemia    • Essential hypertension    • Vitamin D deficiency    • Hypothyroidism due to Hashimoto's thyroiditis    • Primary hyperparathyroidism (CMS/HCC)    • Hyperglycemia    • Controlled type 2 diabetes mellitus with complication, without long-term current use of insulin (CMS/HCC) Yes   • Hyperuricemia      60-year-old female patient here today for follow-up visit.  She has been seen for the above-mentioned problems.  She is under a lot of stress taking care of her aging parents.  Her dad has dementia and her mother has cancer.  She is taking her medications as prescribed.  She states she is not eating very much however she states she is not able to lose weight and is gaining weight.  She does have type 2 diabetes and is currently not on any therapies.  She has been prescribed therapy in the past however her insurance would not approve it.  We discussed starting metformin 500 mg twice daily.  A prescription has been sent to her pharmacy.  She denies any symptoms associated with hypothyroidism.  She is taking her medications daily  consistently as prescribed.  Her labs were reviewed and she was provided a copy.    The following portions of the patient's history were reviewed and updated as appropriate: allergies, current medications, past family history, past medical history, past social history, past surgical history and problem list.    Review of Systems   Constitutional: Positive for fatigue. Negative for appetite change.   Eyes: Negative for visual disturbance.   Respiratory: Negative for cough.    Cardiovascular: Negative for leg swelling.   Gastrointestinal: Negative for constipation and diarrhea.   Endocrine: Negative for cold intolerance, heat intolerance, polydipsia, polyphagia and polyuria.   Genitourinary: Negative for frequency.   Neurological: Negative for numbness.       Objective   Physical Exam   Constitutional: She is oriented to person, place, and time. She appears well-developed and well-nourished. No distress.   Morbidly obese   HENT:   Head: Normocephalic and atraumatic.   Right Ear: External ear normal.   Left Ear: External ear normal.   Nose: Nose normal.   Mouth/Throat: Oropharynx is clear and moist. No oropharyngeal exudate.   Eyes: Pupils are equal, round, and reactive to light. Conjunctivae and EOM are normal. Right eye exhibits no discharge. Left eye exhibits no discharge. No scleral icterus.   Neck: Normal range of motion. Neck supple. No JVD present. No tracheal deviation present. No thyromegaly present.   Cardiovascular: Normal rate, regular rhythm, normal heart sounds and intact distal pulses. Exam reveals no gallop and no friction rub.   No murmur heard.  Pulmonary/Chest: Effort normal and breath sounds normal. No stridor. No respiratory distress. She has no wheezes. She has no rales. She exhibits no tenderness.   Abdominal: Soft. Bowel sounds are normal. She exhibits no distension and no mass. There is no tenderness. There is no rebound and no guarding. No hernia.   Musculoskeletal: Normal range of motion. She  exhibits edema. She exhibits no tenderness or deformity.   Edema in right foot    Diabetic foot exam performed: Edema and her feet especially on right following right knee replacement.   During the foot exam she had a monofilament test not performed (Denies neuropathy).  Lymphadenopathy:     She has no cervical adenopathy.   Neurological: She is alert and oriented to person, place, and time. She has normal reflexes. She displays normal reflexes. No cranial nerve deficit. She exhibits normal muscle tone. Coordination normal.   Skin: Skin is warm and dry. No rash noted. She is not diaphoretic. No erythema. No pallor.   Psychiatric: She has a normal mood and affect. Her behavior is normal. Judgment and thought content normal.   Vitals reviewed.    Results for orders placed or performed in visit on 06/24/20   Comprehensive Metabolic Panel   Result Value Ref Range    Glucose 115 (H) 65 - 99 mg/dL    BUN 14 8 - 23 mg/dL    Creatinine 0.92 0.57 - 1.00 mg/dL    eGFR Non African Am 62 >60 mL/min/1.73    eGFR African Am 75 >60 mL/min/1.73    BUN/Creatinine Ratio 15.2 7.0 - 25.0    Sodium 140 136 - 145 mmol/L    Potassium 4.4 3.5 - 5.2 mmol/L    Chloride 101 98 - 107 mmol/L    Total CO2 27.1 22.0 - 29.0 mmol/L    Calcium 10.0 8.6 - 10.5 mg/dL    Total Protein 6.8 6.0 - 8.5 g/dL    Albumin 4.10 3.50 - 5.20 g/dL    Globulin 2.7 gm/dL    A/G Ratio 1.5 g/dL    Total Bilirubin 0.5 0.2 - 1.2 mg/dL    Alkaline Phosphatase 126 (H) 39 - 117 U/L    AST (SGOT) 19 1 - 32 U/L    ALT (SGPT) 24 1 - 33 U/L   Lipid Panel   Result Value Ref Range    Total Cholesterol 209 (H) 0 - 200 mg/dL    Triglycerides 220 (H) 0 - 150 mg/dL    HDL Cholesterol 55 40 - 60 mg/dL    VLDL Cholesterol 44 mg/dL    LDL Cholesterol  110 (H) 0 - 100 mg/dL   Cardiovascular Risk Assessment   Result Value Ref Range    Interpretation Note    T4 & TSH ([facility])   Result Value Ref Range    TSH 2.420 0.270 - 4.200 uIU/mL    T4, Total 7.56 4.50 - 11.70 mcg/dL   Diabetes  Patient Education   Result Value Ref Range    PDF Image Not applicable    Hemoglobin A1c   Result Value Ref Range    Hemoglobin A1C 6.60 (H) 4.80 - 5.60 %   T4, Free   Result Value Ref Range    Free T4 1.51 0.93 - 1.70 ng/dL   C-Peptide   Result Value Ref Range    C-Peptide 3.9 1.1 - 4.4 ng/mL   Vitamin D 25 Hydroxy   Result Value Ref Range    25 Hydroxy, Vitamin D 35.4 30.0 - 100.0 ng/ml   MicroAlbumin, Urine, Random -   Result Value Ref Range    Microalbumin, Urine 5.0 Not Estab. ug/mL   Uric Acid   Result Value Ref Range    Uric Acid 9.6 (H) 2.4 - 5.7 mg/dL   T3, Free   Result Value Ref Range    T3, Free 2.5 2.0 - 4.4 pg/mL       Assessment/Plan   Problems Addressed this Visit        Cardiovascular and Mediastinum    Hyperlipidemia    Hypertension       Digestive    Vitamin D deficiency       Endocrine    Hypothyroid    Relevant Medications    UNITHROID 75 MCG tablet    Primary hyperparathyroidism (CMS/HCC)    Controlled type 2 diabetes mellitus with complication, without long-term current use of insulin (CMS/HCC) - Primary    Relevant Medications    metFORMIN (Glucophage) 500 MG tablet       Other    Hyperglycemia    Hyperuricemia        Patient was seen and examined.  She does have hyperuricemia.  She denies any history of kidney stones or gout.  She will be given information regarding foods high in purine as well as uric acid.  She does not want to take any other medication at this time to lower her uric acid.  She will be started on metformin 500 mg twice daily for an A1c of 6.6.  Her blood pressures in satisfactory range.  Thyroid hormones are stable and vitamin D is stable.  Her LDL cholesterol is above normal and she is currently on atorvastatin 20 mg.  In the past her LDL cholesterol has been in satisfactory range.  We will continue to monitor and keep her on her same dose of atorvastatin.  She will follow-up in 6 months with Dr. Castro or myself with labs prior.  She is been encouraged to reach out via my  chart should she have any questions or concerns prior to her next visit

## 2020-07-31 ENCOUNTER — TELEPHONE (OUTPATIENT)
Dept: ENDOCRINOLOGY | Age: 60
End: 2020-07-31

## 2020-07-31 RX ORDER — SITAGLIPTIN 100 MG/1
100 TABLET, FILM COATED ORAL DAILY
Qty: 30 TABLET | Refills: 5 | Status: SHIPPED | OUTPATIENT
Start: 2020-07-31 | End: 2021-01-08 | Stop reason: SDUPTHER

## 2020-08-17 RX ORDER — ATORVASTATIN CALCIUM 20 MG/1
TABLET, FILM COATED ORAL
Qty: 90 TABLET | Refills: 2 | Status: SHIPPED | OUTPATIENT
Start: 2020-08-17 | End: 2021-05-26

## 2020-08-31 RX ORDER — POTASSIUM CHLORIDE 20 MEQ/1
TABLET, EXTENDED RELEASE ORAL
Qty: 14 TABLET | Refills: 0 | Status: SHIPPED | OUTPATIENT
Start: 2020-08-31 | End: 2021-09-30

## 2020-09-09 RX ORDER — ERGOCALCIFEROL 1.25 MG/1
CAPSULE ORAL
Qty: 24 CAPSULE | Refills: 2 | Status: SHIPPED | OUTPATIENT
Start: 2020-09-09 | End: 2021-01-08 | Stop reason: SDUPTHER

## 2020-11-23 DIAGNOSIS — I10 ESSENTIAL HYPERTENSION: Primary | ICD-10-CM

## 2020-11-23 DIAGNOSIS — E55.9 VITAMIN D DEFICIENCY: ICD-10-CM

## 2020-11-23 DIAGNOSIS — E78.00 PURE HYPERCHOLESTEROLEMIA: ICD-10-CM

## 2020-11-25 ENCOUNTER — LAB (OUTPATIENT)
Dept: FAMILY MEDICINE CLINIC | Facility: CLINIC | Age: 60
End: 2020-11-25

## 2020-11-25 DIAGNOSIS — E55.9 VITAMIN D DEFICIENCY: ICD-10-CM

## 2020-11-25 DIAGNOSIS — E78.00 PURE HYPERCHOLESTEROLEMIA: ICD-10-CM

## 2020-11-25 DIAGNOSIS — I10 ESSENTIAL HYPERTENSION: ICD-10-CM

## 2020-11-25 LAB
25(OH)D3 SERPL-MCNC: 55.4 NG/ML (ref 30–100)
ALBUMIN SERPL-MCNC: 4.4 G/DL (ref 3.5–5.2)
ALBUMIN/GLOB SERPL: 2.2 G/DL
ALP SERPL-CCNC: 128 U/L (ref 39–117)
ALT SERPL W P-5'-P-CCNC: 28 U/L (ref 1–33)
ANION GAP SERPL CALCULATED.3IONS-SCNC: 11.6 MMOL/L (ref 5–15)
AST SERPL-CCNC: 23 U/L (ref 1–32)
BILIRUB SERPL-MCNC: 0.4 MG/DL (ref 0–1.2)
BUN SERPL-MCNC: 12 MG/DL (ref 8–23)
BUN/CREAT SERPL: 9 (ref 7–25)
CALCIUM SPEC-SCNC: 10.2 MG/DL (ref 8.6–10.5)
CHLORIDE SERPL-SCNC: 99 MMOL/L (ref 98–107)
CHOLEST SERPL-MCNC: 159 MG/DL (ref 0–200)
CO2 SERPL-SCNC: 29.4 MMOL/L (ref 22–29)
CREAT SERPL-MCNC: 1.33 MG/DL (ref 0.57–1)
DEPRECATED RDW RBC AUTO: 42 FL (ref 37–54)
ERYTHROCYTE [DISTWIDTH] IN BLOOD BY AUTOMATED COUNT: 13.9 % (ref 12.3–15.4)
GFR SERPL CREATININE-BSD FRML MDRD: 41 ML/MIN/1.73
GLOBULIN UR ELPH-MCNC: 2 GM/DL
GLUCOSE SERPL-MCNC: 114 MG/DL (ref 65–99)
HCT VFR BLD AUTO: 43.1 % (ref 34–46.6)
HDLC SERPL-MCNC: 54 MG/DL (ref 40–60)
HGB BLD-MCNC: 13.7 G/DL (ref 12–15.9)
LDLC SERPL CALC-MCNC: 81 MG/DL (ref 0–100)
LDLC/HDLC SERPL: 1.44 {RATIO}
MCH RBC QN AUTO: 26.4 PG (ref 26.6–33)
MCHC RBC AUTO-ENTMCNC: 31.8 G/DL (ref 31.5–35.7)
MCV RBC AUTO: 83 FL (ref 79–97)
PLATELET # BLD AUTO: 216 10*3/MM3 (ref 140–450)
PMV BLD AUTO: 12.4 FL (ref 6–12)
POTASSIUM SERPL-SCNC: 4.2 MMOL/L (ref 3.5–5.2)
PROT SERPL-MCNC: 6.4 G/DL (ref 6–8.5)
RBC # BLD AUTO: 5.19 10*6/MM3 (ref 3.77–5.28)
SODIUM SERPL-SCNC: 140 MMOL/L (ref 136–145)
TRIGL SERPL-MCNC: 136 MG/DL (ref 0–150)
VLDLC SERPL-MCNC: 24 MG/DL (ref 5–40)
WBC # BLD AUTO: 10.41 10*3/MM3 (ref 3.4–10.8)

## 2020-11-25 PROCEDURE — 82306 VITAMIN D 25 HYDROXY: CPT | Performed by: INTERNAL MEDICINE

## 2020-11-25 PROCEDURE — 80061 LIPID PANEL: CPT | Performed by: INTERNAL MEDICINE

## 2020-11-25 PROCEDURE — 85027 COMPLETE CBC AUTOMATED: CPT | Performed by: INTERNAL MEDICINE

## 2020-11-25 PROCEDURE — 80053 COMPREHEN METABOLIC PANEL: CPT | Performed by: INTERNAL MEDICINE

## 2020-11-25 PROCEDURE — 36415 COLL VENOUS BLD VENIPUNCTURE: CPT | Performed by: INTERNAL MEDICINE

## 2020-12-14 ENCOUNTER — TELEPHONE (OUTPATIENT)
Dept: GASTROENTEROLOGY | Facility: CLINIC | Age: 60
End: 2020-12-14

## 2020-12-14 RX ORDER — SUCRALFATE ORAL 1 G/10ML
1 SUSPENSION ORAL 4 TIMES DAILY
Qty: 420 ML | Refills: 3 | Status: SHIPPED | OUTPATIENT
Start: 2020-12-14 | End: 2022-02-15

## 2020-12-14 NOTE — TELEPHONE ENCOUNTER
----- Message from Reid Hou sent at 12/14/2020 11:33 AM EST -----  Regarding: question/med  Contact: [phone]  Pt is under a lot of stress( mother is in stage 3 cancer) causing reflux and stomach issues, has been on carafate liquid in the past and it really helped with her issues and was wondering if it could be called in again. Pt does had appt           MICHELE LOW [facility] - [place], [place] - [address] [place] [address] [address] - [phone] PH - [phone] FX  Phone:  [phone]  Fax:  [phone]  Address:  [address] [facility] [address]

## 2020-12-28 ENCOUNTER — LAB (OUTPATIENT)
Dept: ENDOCRINOLOGY | Age: 60
End: 2020-12-28

## 2020-12-28 DIAGNOSIS — E21.0 PRIMARY HYPERPARATHYROIDISM (HCC): ICD-10-CM

## 2020-12-28 DIAGNOSIS — E11.8 CONTROLLED TYPE 2 DIABETES MELLITUS WITH COMPLICATION, WITHOUT LONG-TERM CURRENT USE OF INSULIN (HCC): ICD-10-CM

## 2020-12-28 DIAGNOSIS — E78.00 PURE HYPERCHOLESTEROLEMIA: Primary | ICD-10-CM

## 2020-12-28 DIAGNOSIS — L40.50 PSORIATIC ARTHRITIS (HCC): ICD-10-CM

## 2020-12-28 DIAGNOSIS — I10 ESSENTIAL HYPERTENSION: ICD-10-CM

## 2020-12-28 DIAGNOSIS — E78.00 PURE HYPERCHOLESTEROLEMIA: ICD-10-CM

## 2020-12-28 DIAGNOSIS — E79.0 HYPERURICEMIA: ICD-10-CM

## 2020-12-28 DIAGNOSIS — R79.89 ELEVATED PTHRP LEVEL: ICD-10-CM

## 2020-12-28 DIAGNOSIS — E03.8 HYPOTHYROIDISM DUE TO HASHIMOTO'S THYROIDITIS: ICD-10-CM

## 2020-12-28 DIAGNOSIS — E55.9 VITAMIN D DEFICIENCY: ICD-10-CM

## 2020-12-28 DIAGNOSIS — E06.3 HYPOTHYROIDISM DUE TO HASHIMOTO'S THYROIDITIS: ICD-10-CM

## 2020-12-29 ENCOUNTER — OFFICE VISIT (OUTPATIENT)
Dept: FAMILY MEDICINE CLINIC | Facility: CLINIC | Age: 60
End: 2020-12-29

## 2020-12-29 VITALS
DIASTOLIC BLOOD PRESSURE: 80 MMHG | TEMPERATURE: 97.5 F | BODY MASS INDEX: 44.53 KG/M2 | WEIGHT: 242 LBS | RESPIRATION RATE: 16 BRPM | HEIGHT: 62 IN | SYSTOLIC BLOOD PRESSURE: 128 MMHG | HEART RATE: 88 BPM | OXYGEN SATURATION: 94 %

## 2020-12-29 DIAGNOSIS — E11.8 CONTROLLED TYPE 2 DIABETES MELLITUS WITH COMPLICATION, WITHOUT LONG-TERM CURRENT USE OF INSULIN (HCC): ICD-10-CM

## 2020-12-29 DIAGNOSIS — I10 ESSENTIAL HYPERTENSION: Primary | ICD-10-CM

## 2020-12-29 DIAGNOSIS — K21.00 GASTROESOPHAGEAL REFLUX DISEASE WITH ESOPHAGITIS WITHOUT HEMORRHAGE: ICD-10-CM

## 2020-12-29 LAB
25(OH)D3+25(OH)D2 SERPL-MCNC: 54.3 NG/ML (ref 30–100)
ALBUMIN SERPL-MCNC: 4.1 G/DL (ref 3.5–5.2)
ALBUMIN/GLOB SERPL: 1.4 G/DL
ALP SERPL-CCNC: 145 U/L (ref 39–117)
ALT SERPL-CCNC: 22 U/L (ref 1–33)
AST SERPL-CCNC: 17 U/L (ref 1–32)
BILIRUB SERPL-MCNC: 0.2 MG/DL (ref 0–1.2)
BUN SERPL-MCNC: 12 MG/DL (ref 8–23)
BUN/CREAT SERPL: 12.4 (ref 7–25)
C PEPTIDE SERPL-MCNC: 4.3 NG/ML (ref 1.1–4.4)
CALCIUM SERPL-MCNC: 9.8 MG/DL (ref 8.6–10.5)
CHLORIDE SERPL-SCNC: 102 MMOL/L (ref 98–107)
CHOLEST SERPL-MCNC: 191 MG/DL (ref 0–200)
CO2 SERPL-SCNC: 29.2 MMOL/L (ref 22–29)
CREAT SERPL-MCNC: 0.97 MG/DL (ref 0.57–1)
GLOBULIN SER CALC-MCNC: 2.9 GM/DL
GLUCOSE SERPL-MCNC: 106 MG/DL (ref 65–99)
HBA1C MFR BLD: 6.5 % (ref 4.8–5.6)
HDLC SERPL-MCNC: 60 MG/DL (ref 40–60)
INTERPRETATION: NORMAL
LDLC SERPL CALC-MCNC: 101 MG/DL (ref 0–100)
Lab: NORMAL
POTASSIUM SERPL-SCNC: 3.9 MMOL/L (ref 3.5–5.2)
PROT SERPL-MCNC: 7 G/DL (ref 6–8.5)
PTH-INTACT SERPL-MCNC: 49 PG/ML (ref 15–65)
SODIUM SERPL-SCNC: 142 MMOL/L (ref 136–145)
T3FREE SERPL-MCNC: 2.2 PG/ML (ref 2–4.4)
T4 FREE SERPL-MCNC: 1.48 NG/DL (ref 0.93–1.7)
TRIGL SERPL-MCNC: 172 MG/DL (ref 0–150)
TSH SERPL DL<=0.005 MIU/L-ACNC: 3.15 UIU/ML (ref 0.27–4.2)
URATE SERPL-MCNC: 7.3 MG/DL (ref 2.4–5.7)
VLDLC SERPL CALC-MCNC: 30 MG/DL (ref 5–40)

## 2020-12-29 PROCEDURE — 99214 OFFICE O/P EST MOD 30 MIN: CPT | Performed by: INTERNAL MEDICINE

## 2020-12-29 NOTE — PROGRESS NOTES
Subjective   Laura Cerda is a 60 y.o. female.     Vitals:    12/29/20 1539   BP: 128/80   Pulse: 88   Resp: 16   Temp: 97.5 °F (36.4 °C)   SpO2: 94%      Body mass index is 44.25 kg/m².     History of Present Illness   Patient was seen for hypertension.  Blood pressure has been running 120s over 80s.  Patient's blood sugars is running in the 130s.  Patient's hemoglobin A1c was 6.5%.  Patient got continue to monitor blood pressure and blood sugar.  Patient will continue present diet and activity levels.  Patient also has gastroesophageal reflux and is using pantoprazole 40 mg daily.  Symptoms have been relieved with this medication.    Dictated utilizing Dragon dictation. If there are questions or for further clarification, please contact me.  The following portions of the patient's history were reviewed and updated as appropriate: allergies, current medications, past family history, past medical history, past social history, past surgical history and problem list.    Review of Systems   Constitutional: Negative for fatigue and fever.   HENT: Positive for congestion. Negative for trouble swallowing.    Eyes: Negative for discharge and visual disturbance.   Respiratory: Negative for choking and shortness of breath.    Cardiovascular: Negative for chest pain and palpitations.   Gastrointestinal: Negative for abdominal pain and blood in stool.   Endocrine: Negative.    Genitourinary: Negative for genital sores and hematuria.   Musculoskeletal: Negative for gait problem and joint swelling.   Skin: Negative for color change, pallor, rash and wound.   Allergic/Immunologic: Positive for environmental allergies. Negative for immunocompromised state.   Neurological: Negative for facial asymmetry and speech difficulty.   Psychiatric/Behavioral: Negative for hallucinations and suicidal ideas.       Objective   Physical Exam  Vitals signs and nursing note reviewed.   Constitutional:       Appearance: Normal appearance. She is  well-developed.   HENT:      Head: Normocephalic and atraumatic.      Nose: Nose normal.      Mouth/Throat:      Mouth: Mucous membranes are moist.      Pharynx: Oropharynx is clear.   Eyes:      Extraocular Movements: Extraocular movements intact.      Conjunctiva/sclera: Conjunctivae normal.      Pupils: Pupils are equal, round, and reactive to light.   Neck:      Musculoskeletal: Normal range of motion and neck supple.   Cardiovascular:      Rate and Rhythm: Normal rate and regular rhythm.      Heart sounds: Normal heart sounds. No murmur. No friction rub. No gallop.    Pulmonary:      Effort: Pulmonary effort is normal. No respiratory distress.      Breath sounds: Normal breath sounds. No stridor. No wheezing, rhonchi or rales.   Chest:      Chest wall: No tenderness.   Abdominal:      General: Bowel sounds are normal.      Palpations: Abdomen is soft.   Musculoskeletal: Normal range of motion.   Skin:     General: Skin is warm and dry.   Neurological:      General: No focal deficit present.      Mental Status: She is alert and oriented to person, place, and time. Mental status is at baseline.   Psychiatric:         Mood and Affect: Mood normal.         Behavior: Behavior normal.         Thought Content: Thought content normal.         Judgment: Judgment normal.         Assessment/Plan #1 continue to monitor blood pressure blood sugar #2 continue present diet and activity levels #3 follow-up in 6 months  Problems Addressed this Visit     Cardiac and Vasculature              Hypertension - Primary          Endocrine and Metabolic              Controlled type 2 diabetes mellitus with complication, without long-term current use of insulin (CMS/[facility])          Gastrointestinal Abdominal               GERD (gastroesophageal reflux disease)            Diagnoses     Diagnosis Codes Comments    Essential hypertension    -  Primary ICD-10-CM: I10  ICD-9-CM: 401.9     Controlled type 2 diabetes mellitus with complication,  without long-term current use of insulin (CMS/[facility])     ICD-10-CM: E11.8  ICD-9-CM: 250.90     Gastroesophageal reflux disease with esophagitis without hemorrhage     ICD-10-CM: K21.00  ICD-9-CM: 530.81, 530.10

## 2021-01-05 ENCOUNTER — OFFICE VISIT (OUTPATIENT)
Dept: GASTROENTEROLOGY | Facility: CLINIC | Age: 61
End: 2021-01-05

## 2021-01-05 VITALS — WEIGHT: 248 LBS | HEIGHT: 62 IN | BODY MASS INDEX: 45.64 KG/M2 | TEMPERATURE: 97.6 F

## 2021-01-05 DIAGNOSIS — E55.9 VITAMIN D DEFICIENCY: ICD-10-CM

## 2021-01-05 DIAGNOSIS — K21.9 GASTROESOPHAGEAL REFLUX DISEASE WITHOUT ESOPHAGITIS: Primary | ICD-10-CM

## 2021-01-05 DIAGNOSIS — K22.10 ULCER OF ESOPHAGUS WITHOUT BLEEDING: ICD-10-CM

## 2021-01-05 DIAGNOSIS — K63.5 POLYP OF COLON, UNSPECIFIED PART OF COLON, UNSPECIFIED TYPE: ICD-10-CM

## 2021-01-05 PROCEDURE — 99214 OFFICE O/P EST MOD 30 MIN: CPT | Performed by: NURSE PRACTITIONER

## 2021-01-05 RX ORDER — PANTOPRAZOLE SODIUM 40 MG/1
40 TABLET, DELAYED RELEASE ORAL DAILY
Qty: 30 TABLET | Refills: 11 | Status: SHIPPED | OUTPATIENT
Start: 2021-01-05 | End: 2022-02-10 | Stop reason: SDUPTHER

## 2021-01-05 NOTE — PROGRESS NOTES
Chief Complaint   Patient presents with   • Heartburn   • Abdominal Pain       Laura Cerda is a  60 y.o. female here for a follow up visit for GERD.    HPI  60-year-old female presents today for follow-up visit for GERD.  She is a patient of Dr. Rodríguez.  She was last seen in the office by me on 2020.  She has a history of GERD/ulcers/hiatal hernia and admits she does really well as long as she takes her Protonix 40 mg daily.  She takes it every morning and admits by evening sometimes she has some breakthrough reflux.  Reflux is definitely worse through the night especially if she eats late.  She also has a history of colon polyps.  Her last EGD and colonoscopy were in .  She will be due again for screening scopes in .  Patient denies any dysphagia, abdominal pain, nausea and vomiting, diarrhea, constipation, rectal bleeding or melena.  She is appetite is good and her weight is stable.  Past Medical History:   Diagnosis Date   • Arthritis     psoriatic   • Chest pain    • Chronic kidney disease    • Elevated PTHrP level    • Fatigue    • Fibromyalgia    • Fibromyalgia, primary    • Foot swelling    • GERD (gastroesophageal reflux disease)    • GERD (gastroesophageal reflux disease)    • History of Holter monitoring 2016   • Hyperlipidemia    • Hyperparathyroidism (CMS/HCC)    • Hypertension    • Hypothyroidism    • Joint pain     worsening   • Meniere's disease    • Nausea    • Osteoarthritis    • Palpitations    • PONV (postoperative nausea and vomiting)    • Postmenopausal    • Psoriatic arthritis (CMS/HCC)    • Rapid heart rate    • Raynaud disease    • Thyroid disease    • Vitamin D deficiency        Past Surgical History:   Procedure Laterality Date   • BREAST BIOPSY Right 2013    benign   • CATARACT EXTRACTION  17;17   •  SECTION     •  SECTION     • CHOLECYSTECTOMY     • COLONOSCOPY N/A 2018    Procedure: COLONOSCOPY INTO CECUM AND TERMINAL  ILEUM WTIH COLD BIOPSIES;  Surgeon: Reji Rodríguez MD;  Location: [facility] ENDOSCOPY;  Service: Gastroenterology   • ENDOSCOPY N/A 8/24/2018    Procedure: ESOPHAGOGASTRODUODENOSCOPY WITH BIOPSIES;  Surgeon: Reji Rodríguez MD;  Location: [facility] ENDOSCOPY;  Service: Gastroenterology   • ENDOSCOPY N/A 11/30/2018    Procedure: ESOPHAGOGASTRODUODENOSCOPY;  Surgeon: Reji Rodríguez MD;  Location: [facility] ENDOSCOPY;  Service: Gastroenterology   • HEMORRHOIDECTOMY     • REPLACEMENT TOTAL KNEE Right 02/2020   • TUBAL ABDOMINAL LIGATION     • WISDOM TOOTH EXTRACTION         Scheduled Meds:    Continuous Infusions:No current facility-administered medications for this visit.       PRN Meds:.    Allergies   Allergen Reactions   • Metformin GI Intolerance   • Augmentin [Amoxicillin-Pot Clavulanate] Hives     Temperature high   • Azithromycin Rash   • Ceclor [Cefaclor] Rash   • Lyrica [Pregabalin] Swelling       Social History     Socioeconomic History   • Marital status:      Spouse name: Not on file   • Number of children: 2   • Years of education: Not on file   • Highest education level: Not on file   Tobacco Use   • Smoking status: Never Smoker   • Smokeless tobacco: Never Used   Substance and Sexual Activity   • Alcohol use: No   • Drug use: No   • Sexual activity: Yes     Partners: Male     Birth control/protection: Post-menopausal       Family History   Problem Relation Age of Onset   • Cancer Mother         Breast   • Heart failure Mother    • Hypertension Mother    • Diabetes Mother    • Inflammatory bowel disease Mother    • Diverticulitis Mother    • Cancer Father         Colon   • Diabetes Father    • Arthritis Brother    • Heart disease Maternal Grandmother    • Stroke Maternal Grandmother    • Inflammatory bowel disease Maternal Grandmother    • Diverticulitis Maternal Grandmother    • Heart disease Maternal Grandfather    • Colon cancer Paternal Grandmother        Review of Systems    Constitutional: Negative for appetite change, chills, diaphoresis, fatigue, fever and unexpected weight change.   HENT: Negative for nosebleeds, postnasal drip, sore throat, trouble swallowing and voice change.    Respiratory: Negative for cough, choking, chest tightness, shortness of breath and wheezing.    Cardiovascular: Negative for chest pain, palpitations and leg swelling.   Gastrointestinal: Negative for abdominal distention, abdominal pain, anal bleeding, blood in stool, constipation, diarrhea, nausea, rectal pain and vomiting.   Endocrine: Negative for polydipsia, polyphagia and polyuria.   Musculoskeletal: Negative for gait problem.   Skin: Negative for rash and wound.   Allergic/Immunologic: Negative for food allergies.   Neurological: Negative for dizziness, speech difficulty and light-headedness.   Psychiatric/Behavioral: Negative for confusion, self-injury, sleep disturbance and suicidal ideas.       Vitals:    01/05/21 0938   Temp: 97.6 °F (36.4 °C)       Physical Exam  Constitutional:       General: She is not in acute distress.     Appearance: She is well-developed. She is not ill-appearing.   HENT:      Head: Normocephalic.   Eyes:      Pupils: Pupils are equal, round, and reactive to light.   Cardiovascular:      Rate and Rhythm: Normal rate and regular rhythm.      Heart sounds: Normal heart sounds.   Pulmonary:      Effort: Pulmonary effort is normal.      Breath sounds: Normal breath sounds.   Abdominal:      General: Bowel sounds are normal. There is no distension.      Palpations: Abdomen is soft. There is no mass.      Tenderness: There is no abdominal tenderness. There is no guarding or rebound.      Hernia: No hernia is present.   Musculoskeletal: Normal range of motion.   Skin:     General: Skin is warm and dry.   Neurological:      Mental Status: She is alert and oriented to person, place, and time.   Psychiatric:         Speech: Speech normal.         Behavior: Behavior normal.          Judgment: Judgment normal.         No radiology results for the last 7 days     Assessment & Plan    1. Gastroesophageal reflux disease without esophagitis    2. Vitamin D deficiency    3. Ulcer of esophagus without bleeding    4. Polyp of colon, unspecified part of colon, unspecified type      GERD is well controlled with protonix 40 mg daily. Continue GERD precautions. Bowels are moving well. Can add pepcid OTC PRN. Continue daily Vitamin D.  Patient to call the office with any issues.  Patient to follow-up with me in 1 year.  Patient is agreeable to the plan.

## 2021-01-08 ENCOUNTER — OFFICE VISIT (OUTPATIENT)
Dept: ENDOCRINOLOGY | Age: 61
End: 2021-01-08

## 2021-01-08 DIAGNOSIS — E03.8 HYPOTHYROIDISM DUE TO HASHIMOTO'S THYROIDITIS: ICD-10-CM

## 2021-01-08 DIAGNOSIS — I10 ESSENTIAL HYPERTENSION: ICD-10-CM

## 2021-01-08 DIAGNOSIS — E55.9 VITAMIN D DEFICIENCY: ICD-10-CM

## 2021-01-08 DIAGNOSIS — E11.8 CONTROLLED TYPE 2 DIABETES MELLITUS WITH COMPLICATION, WITHOUT LONG-TERM CURRENT USE OF INSULIN (HCC): Primary | ICD-10-CM

## 2021-01-08 DIAGNOSIS — E78.00 PURE HYPERCHOLESTEROLEMIA: ICD-10-CM

## 2021-01-08 DIAGNOSIS — E21.0 PRIMARY HYPERPARATHYROIDISM (HCC): ICD-10-CM

## 2021-01-08 DIAGNOSIS — E06.3 HYPOTHYROIDISM DUE TO HASHIMOTO'S THYROIDITIS: ICD-10-CM

## 2021-01-08 PROCEDURE — 99443 PR PHYS/QHP TELEPHONE EVALUATION 21-30 MIN: CPT | Performed by: NURSE PRACTITIONER

## 2021-01-08 RX ORDER — ERGOCALCIFEROL 1.25 MG/1
50000 CAPSULE ORAL 2 TIMES WEEKLY
Qty: 24 CAPSULE | Refills: 1 | Status: SHIPPED | OUTPATIENT
Start: 2021-01-11 | End: 2021-06-16 | Stop reason: SDUPTHER

## 2021-01-08 RX ORDER — ALLOPURINOL 100 MG/1
100 TABLET ORAL DAILY
Qty: 90 TABLET | Refills: 1 | Status: SHIPPED | OUTPATIENT
Start: 2021-01-08 | End: 2021-05-19 | Stop reason: SDUPTHER

## 2021-01-08 RX ORDER — LEVOTHYROXINE SODIUM 75 UG/1
75 TABLET ORAL DAILY
Qty: 90 TABLET | Refills: 1 | Status: SHIPPED | OUTPATIENT
Start: 2021-01-08 | End: 2021-06-16 | Stop reason: SDUPTHER

## 2021-01-08 RX ORDER — SITAGLIPTIN 100 MG/1
100 TABLET, FILM COATED ORAL DAILY
Qty: 90 TABLET | Refills: 1 | Status: SHIPPED | OUTPATIENT
Start: 2021-01-08 | End: 2021-06-16 | Stop reason: SDUPTHER

## 2021-01-08 NOTE — PROGRESS NOTES
Subjective    Laura Cerda is a 60 y.o. female. she is here today for follow-up.  Chief Complaint   Patient presents with   • Diabetes     TYPE 2 DIABETES, RECENT LABS, DOESNT CHECK BLOOD SUGARS,  EYE EXAM 11/2020   • Hypothyroidism     There were no vitals taken for this visit.  You have chosen to receive care through a telephone visit. Do you consent to use a telephone visit for your medical care today? Yes  Total time 25 min     History of Present Illness   Encounter Diagnoses   Name Primary?   • Controlled type 2 diabetes mellitus with complication, without long-term current use of insulin (CMS/[facility], located within [facility]) Yes   • Hypothyroidism due to Hashimoto's thyroiditis    • Primary hyperparathyroidism (CMS/[facility], located within [facility])    • Vitamin D deficiency    • Essential hypertension    • Pure hypercholesterolemia      60-year-old female patient evaluated via telehealth for the above diagnoses.  She is currently under quarantine after exposure to coronavirus.  She does have some nasal stuffiness but otherwise is feeling okay.  She is taking her medications as prescribed.  Her medication list was reviewed and updated for accuracy.  She does not have any history of gout or kidney stones however she does state that she gets an ache in her big toe.  She does 1 to be treated for hyperuricemia.  Her labs were reviewed and she has access to them via [email or web address].  She denies any symptoms associated with hyper or hypothyroidism.  She is on statin therapy and does have elevated LDL cholesterol.  She does not want to increase her atorvastatin at this time and instead wants to do dietary changes.  She will be sent information via [email or web address]    The following portions of the patient's history were reviewed and updated as appropriate:   Past Medical History:   Diagnosis Date   • Arthritis     psoriatic   • Chest pain    • Chronic kidney disease    • Elevated PTHrP level    • Fatigue    • Fibromyalgia    • Fibromyalgia, primary    • Foot swelling    • GERD  (gastroesophageal reflux disease)    • GERD (gastroesophageal reflux disease)    • History of Holter monitoring 2016   • Hyperlipidemia    • Hyperparathyroidism (CMS/HCC)    • Hypertension    • Hypothyroidism    • Joint pain     worsening   • Meniere's disease    • Nausea    • Osteoarthritis    • Palpitations    • PONV (postoperative nausea and vomiting)    • Postmenopausal    • Psoriatic arthritis (CMS/HCC)    • Rapid heart rate    • Raynaud disease    • Thyroid disease    • Vitamin D deficiency      Past Surgical History:   Procedure Laterality Date   • BREAST BIOPSY Right 2013    benign   • CATARACT EXTRACTION  17;17   •  SECTION     •  SECTION     • CHOLECYSTECTOMY     • COLONOSCOPY N/A 2018    Procedure: COLONOSCOPY INTO CECUM AND TERMINAL ILEUM WTIH COLD BIOPSIES;  Surgeon: Reji Rodríguez MD;  Location: [facility] ENDOSCOPY;  Service: Gastroenterology   • ENDOSCOPY N/A 2018    Procedure: ESOPHAGOGASTRODUODENOSCOPY WITH BIOPSIES;  Surgeon: Reji Rodríguez MD;  Location: [facility] ENDOSCOPY;  Service: Gastroenterology   • ENDOSCOPY N/A 2018    Procedure: ESOPHAGOGASTRODUODENOSCOPY;  Surgeon: Reji Rodríguez MD;  Location: [facility] ENDOSCOPY;  Service: Gastroenterology   • HEMORRHOIDECTOMY     • REPLACEMENT TOTAL KNEE Right 2020   • TUBAL ABDOMINAL LIGATION     • WISDOM TOOTH EXTRACTION       Family History   Problem Relation Age of Onset   • Cancer Mother         Breast   • Heart failure Mother    • Hypertension Mother    • Diabetes Mother    • Inflammatory bowel disease Mother    • Diverticulitis Mother    • Cancer Father         Colon   • Diabetes Father    • Arthritis Brother    • Heart disease Maternal Grandmother    • Stroke Maternal Grandmother    • Inflammatory bowel disease Maternal Grandmother    • Diverticulitis Maternal Grandmother    • Heart disease Maternal Grandfather    • Colon cancer Paternal Grandmother      OB History         2    Para   2    Term   2            AB        Living   2       SAB        TAB        Ectopic        Molar        Multiple        Live Births   2              Current Outpatient Medications   Medication Sig Dispense Refill   • acetaminophen (TYLENOL) 500 MG tablet Take 500 mg by mouth as needed for mild pain (1-3).     • atorvastatin (LIPITOR) 20 MG tablet TAKE ONE TABLET BY MOUTH ONCE NIGHTLY FOR CHOLESTEROL 90 tablet 2   • fexofenadine (ALLEGRA) 180 MG tablet TAKE ONE TABLET BY MOUTH DAILY (Patient taking differently: Take 180 mg by mouth Daily As Needed.) 90 tablet 2   • furosemide (LASIX) 40 MG tablet Take 1 tablet by mouth Daily. (Patient taking differently: Take 20 mg by mouth Daily.) 90 tablet 1   • JANUVIA 100 MG tablet Take 1 tablet by mouth Daily. 30 tablet 5   • milnacipran (SAVELLA) 50 MG tablet tablet Take  by mouth 2 (two) times a day.     • mometasone (NASONEX) 50 MCG/ACT nasal spray 2 sprays into each nostril Daily.     • Multiple Vitamins-Minerals (MULTIVITAMIN ADULT PO) Take  by mouth daily.     • nabumetone (RELAFEN) 750 MG tablet Take 750 mg by mouth 2 (Two) Times a Day.     • Omega-3 Fatty Acids (FISH OIL) 1000 MG capsule capsule Take  by mouth daily with breakfast.     • pantoprazole (PROTONIX) 40 MG EC tablet Take 1 tablet by mouth Daily. 30 tablet 11   • potassium chloride (KLOR-CON) 20 MEQ CR tablet TAKE ONE TABLET BY MOUTH THREE TIMES A DAY (Patient taking differently: Daily.) 14 tablet 0   • Secukinumab (COSENTYX SC) Inject  under the skin into the appropriate area as directed Every 30 (Thirty) Days.     • sucralfate (Carafate) 1 GM/10ML suspension Take 10 mL by mouth 4 (Four) Times a Day. 420 mL 3   • tiZANidine (ZANAFLEX) 2 MG tablet Take 2 mg by mouth At Night As Needed for Muscle Spasms.     • UNITHROID 75 MCG tablet Take 1 tablet by mouth Daily. 90 tablet 1   • vitamin D (ERGOCALCIFEROL) 1.25 MG (78624 UT) capsule capsule TAKE 1 CAPSULE BY MOUTH 2 TIMES A WEEK 24  capsule 2     No current facility-administered medications for this visit.      Allergies   Allergen Reactions   • Metformin GI Intolerance   • Augmentin [Amoxicillin-Pot Clavulanate] Hives     Temperature high   • Azithromycin Rash   • Ceclor [Cefaclor] Rash   • Lyrica [Pregabalin] Swelling     Social History     Socioeconomic History   • Marital status:      Spouse name: Not on file   • Number of children: 2   • Years of education: Not on file   • Highest education level: Not on file   Tobacco Use   • Smoking status: Never Smoker   • Smokeless tobacco: Never Used   Substance and Sexual Activity   • Alcohol use: No   • Drug use: No   • Sexual activity: Yes     Partners: Male     Birth control/protection: Post-menopausal       Review of Systems  Review of Systems   Constitutional: Negative for appetite change and fatigue.   Eyes: Negative for visual disturbance.   Respiratory: Negative for cough.    Cardiovascular: Negative for leg swelling.   Gastrointestinal: Negative for constipation and diarrhea.   Endocrine: Negative for cold intolerance, heat intolerance, polydipsia, polyphagia and polyuria.   Genitourinary: Negative for frequency.   Neurological: Negative for numbness.        Objective    There were no vitals taken for this visit.     Physical Exam  Constitutional:       General: She is not in acute distress.     Appearance: She is well-developed.      Comments:  pleasant, no distress   Pulmonary:      Effort: Pulmonary effort is normal. No respiratory distress.   Neurological:      Mental Status: She is alert and oriented to person, place, and time.   Psychiatric:         Thought Content: Thought content normal.         Lab Review  Glucose (mg/dL)   Date Value   11/25/2020 114 (H)   12/05/2018 110 (H)   03/20/2018 102 (H)     Sodium (mmol/L)   Date Value   12/28/2020 142   11/25/2020 140   06/24/2020 140   07/22/2019 144   12/05/2018 143   03/20/2018 143     Potassium (mmol/L)   Date Value    12/28/2020 3.9   11/25/2020 4.2   06/24/2020 4.4   07/22/2019 3.5   12/05/2018 3.8   03/20/2018 4.3     Chloride (mmol/L)   Date Value   12/28/2020 102   11/25/2020 99   06/24/2020 101   07/22/2019 99   12/05/2018 101   03/20/2018 100     CO2 (mmol/L)   Date Value   11/25/2020 29.4 (H)   12/05/2018 29.2 (H)   03/20/2018 30.7 (H)     Total CO2 (mmol/L)   Date Value   12/28/2020 29.2 (H)   06/24/2020 27.1   07/22/2019 28.3     BUN (mg/dL)   Date Value   12/28/2020 12   11/25/2020 12   06/24/2020 14   07/22/2019 14   12/05/2018 10   03/20/2018 9     Creatinine (mg/dL)   Date Value   12/28/2020 0.97   11/25/2020 1.33 (H)   06/24/2020 0.92   07/22/2019 0.95   12/05/2018 0.89   03/20/2018 0.87     Hemoglobin A1C (%)   Date Value   12/28/2020 6.50 (H)   06/24/2020 6.60 (H)   07/22/2019 6.20 (H)   12/05/2018 6.06 (H)   12/29/2016 6.60 (H)   08/17/2016 6.5 (H)     Triglycerides (mg/dL)   Date Value   12/28/2020 172 (H)   11/25/2020 136   06/24/2020 220 (H)   07/22/2019 97   12/05/2018 140   08/05/2016 143     LDL Cholesterol  (mg/dL)   Date Value   11/25/2020 81   06/24/2020 110 (H)   07/22/2019 58   12/14/2018 84   12/05/2018 140 (H)   08/05/2016 141 (H)     LDL Chol Calc (NIH) (mg/dL)   Date Value   12/28/2020 101 (H)       Assessment/Plan      1. Controlled type 2 diabetes mellitus with complication, without long-term current use of insulin (CMS/[facility])    2. Hypothyroidism due to Hashimoto's thyroiditis    3. Primary hyperparathyroidism (CMS/HCC)    4. Vitamin D deficiency    5. Essential hypertension    6. Pure hypercholesterolemia    .    Medications prescribed:  Outpatient Encounter Medications as of 1/8/2021   Medication Sig Dispense Refill   • acetaminophen (TYLENOL) 500 MG tablet Take 500 mg by mouth as needed for mild pain (1-3).     • atorvastatin (LIPITOR) 20 MG tablet TAKE ONE TABLET BY MOUTH ONCE NIGHTLY FOR CHOLESTEROL 90 tablet 2   • fexofenadine (ALLEGRA) 180 MG tablet TAKE ONE TABLET BY MOUTH DAILY (Patient  taking differently: Take 180 mg by mouth Daily As Needed.) 90 tablet 2   • furosemide (LASIX) 40 MG tablet Take 1 tablet by mouth Daily. (Patient taking differently: Take 20 mg by mouth Daily.) 90 tablet 1   • JANUVIA 100 MG tablet Take 1 tablet by mouth Daily. 30 tablet 5   • milnacipran (SAVELLA) 50 MG tablet tablet Take  by mouth 2 (two) times a day.     • mometasone (NASONEX) 50 MCG/ACT nasal spray 2 sprays into each nostril Daily.     • Multiple Vitamins-Minerals (MULTIVITAMIN ADULT PO) Take  by mouth daily.     • nabumetone (RELAFEN) 750 MG tablet Take 750 mg by mouth 2 (Two) Times a Day.     • Omega-3 Fatty Acids (FISH OIL) 1000 MG capsule capsule Take  by mouth daily with breakfast.     • pantoprazole (PROTONIX) 40 MG EC tablet Take 1 tablet by mouth Daily. 30 tablet 11   • potassium chloride (KLOR-CON) 20 MEQ CR tablet TAKE ONE TABLET BY MOUTH THREE TIMES A DAY (Patient taking differently: Daily.) 14 tablet 0   • Secukinumab (COSENTYX SC) Inject  under the skin into the appropriate area as directed Every 30 (Thirty) Days.     • sucralfate (Carafate) 1 GM/10ML suspension Take 10 mL by mouth 4 (Four) Times a Day. 420 mL 3   • tiZANidine (ZANAFLEX) 2 MG tablet Take 2 mg by mouth At Night As Needed for Muscle Spasms.     • UNITHROID 75 MCG tablet Take 1 tablet by mouth Daily. 90 tablet 1   • vitamin D (ERGOCALCIFEROL) 1.25 MG (06742 UT) capsule capsule TAKE 1 CAPSULE BY MOUTH 2 TIMES A WEEK 24 capsule 2     No facility-administered encounter medications on file as of 1/8/2021.        Orders placed during this encounter include:  No orders of the defined types were placed in this encounter.  In summary patient was evaluated via telehealth encounter.  Her medication list was reviewed and updated.  Her refills have been sent.  She does have elevated LDL cholesterol and is currently on atorvastatin 20 she plans on doing dietary changes and exercise.  She reports that her weight has been stable.  She is been under a  lot of stress caring for some elderly parents who are in poor health.  She has no symptoms associated with hyper or hypothyroidism.  She will continue on her same dose of Unithroid.  She will follow-up in 6 months with labs prior.  I have requested the  contact her to schedule an upcoming appointment with labs prior.  She currently is not checking her blood sugars.  She recently had an eye exam and is up-to-date.  Metabolically and clinically she presents stable        Allopurinol 100 mg once daily to treat uric acid  Decrease dietary fat to lower cholesterol  Continue all other meds the same    Cholesterol Content in Foods  Cholesterol is a waxy, fat-like substance that helps to carry fat in the blood. The body needs cholesterol in small amounts, but too much cholesterol can cause damage to the arteries and heart.  Most people should eat less than 200 milligrams (mg) of cholesterol a day.  Foods with cholesterol    Cholesterol is found in animal-based foods, such as meat, seafood, and dairy. Generally, low-fat dairy and lean meats have less cholesterol than full-fat dairy and fatty meats. The milligrams of cholesterol per serving (mg per serving) of common cholesterol-containing foods are listed below.  Meat and other proteins  · Egg -- one large whole egg has 186 mg.  · Veal shank -- 4 oz has 141 mg.  · Lean ground turkey (93% lean) -- 4 oz has 118 mg.  · Fat-trimmed lamb loin -- 4 oz has 106 mg.  · Lean ground beef (90% lean) -- 4 oz has 100 mg.  · Lobster -- 3.5 oz has 90 mg.  · Pork loin chops -- 4 oz has 86 mg.  · Canned salmon -- 3.5 oz has 83 mg.  · Fat-trimmed beef top loin -- 4 oz has 78 mg.  · Frankfurter -- 1 kasia (3.5 oz) has 77 mg.  · Crab -- 3.5 oz has 71 mg.  · Roasted chicken without skin, white meat -- 4 oz has 66 mg.  · Light bologna -- 2 oz has 45 mg.  · Deli-cut turkey -- 2 oz has 31 mg.  · Canned tuna -- 3.5 oz has 31 mg.  · Sheppard -- 1 oz has 29 mg.  · Oysters and mussels (raw) --  3.5 oz has 25 mg.  · Mackerel -- 1 oz has 22 mg.  · Trout -- 1 oz has 20 mg.  · Pork sausage -- 1 link (1 oz) has 17 mg.  · Cartwright -- 1 oz has 16 mg.  · Tilapia -- 1 oz has 14 mg.  Dairy  · Soft-serve ice cream -- ½ cup (4 oz) has 103 mg.  · Whole-milk yogurt -- 1 cup (8 oz) has 29 mg.  · Cheddar cheese -- 1 oz has 28 mg.  · American cheese -- 1 oz has 28 mg.  · Whole milk -- 1 cup (8 oz) has 23 mg.  · 2% milk -- 1 cup (8 oz) has 18 mg.  · Cream cheese -- 1 tablespoon (Tbsp) has 15 mg.  · Cottage cheese -- ½ cup (4 oz) has 14 mg.  · Low-fat (1%) milk -- 1 cup (8 oz) has 10 mg.  · Sour cream -- 1 Tbsp has 8.5 mg.  · Low-fat yogurt -- 1 cup (8 oz) has 8 mg.  · Nonfat Greek yogurt -- 1 cup (8 oz) has 7 mg.  · Half-and-half cream -- 1 Tbsp has 5 mg.  Fats and oils  · Cod liver oil -- 1 tablespoon (Tbsp) has 82 mg.  · Butter -- 1 Tbsp has 15 mg.  · Lard -- 1 Tbsp has 14 mg.  · Sheppard grease -- 1 Tbsp has 14 mg.  · Mayonnaise -- 1 Tbsp has 5-10 mg.  · Margarine -- 1 Tbsp has 3-10 mg.  Exact amounts of cholesterol in these foods may vary depending on specific ingredients and brands.  Foods without cholesterol  Most plant-based foods do not have cholesterol unless you combine them with a food that has cholesterol. Foods without cholesterol include:  · Grains and cereals.  · Vegetables.  · Fruits.  · Vegetable oils, such as olive, canola, and sunflower oil.  · Legumes, such as peas, beans, and lentils.  · Nuts and seeds.  · Egg whites.  Summary  · The body needs cholesterol in small amounts, but too much cholesterol can cause damage to the arteries and heart.  · Most people should eat less than 200 milligrams (mg) of cholesterol a day.  This information is not intended to replace advice given to you by your health care provider. Make sure you discuss any questions you have with your health care provider.  Document Revised: 11/30/2018 Document Reviewed: 08/14/2018  Elsevier Patient Education © 2020 [employer] Inc.  Allopurinol  injection  What is this medicine?  ALLOPURINOL (al oh PURE i nole) reduces the amount of uric acid the body makes during chemotherapy. Too much uric acid in the blood can cause damage to your kidneys.  This medicine may be used for other purposes; ask your health care provider or pharmacist if you have questions.  COMMON BRAND NAME(S): Aloprim  What should I tell my health care provider before I take this medicine?  They need to know if you have any of these conditions:  · kidney disease  · liver disease  · an unusual or allergic reaction to allopurinol, other medicines, foods, dyes, or preservatives  · pregnant or trying to get pregnant  · breast feeding  How should I use this medicine?  The medicine is for infusion into a vein. It is given by a health care professional in a hospital or clinic setting.  Talk to your pediatrician regarding the use of this medicine in children. Special care may be needed.  Overdosage: If you think you have taken too much of this medicine contact a poison control center or emergency room at once.  NOTE: This medicine is only for you. Do not share this medicine with others.  What if I miss a dose?  This does not apply.  What may interact with this medicine?  Do not take this medicine with the following medication:  · didanosine, ddI  This medicine may also interact with the following medications:  · certain antibiotics like amoxicillin, ampicillin  · certain medicines for cancer  · certain medicines for immunosuppression like azathioprine, cyclosporine, mercaptopurine  · chlorpropamide  · probenecid  · thiazide diuretics, like hydrochlorothiazide  · sulfinpyrazone  · warfarin  This list may not describe all possible interactions. Give your health care provider a list of all the medicines, herbs, non-prescription drugs, or dietary supplements you use. Also tell them if you smoke, drink alcohol, or use illegal drugs. Some items may interact with your medicine.  What should I watch for  while using this medicine?  Your condition will be monitored carefully while you are receiving this medicine.  This medicine may cause serious skin reactions. They can happen weeks to months after starting the medicine. Contact your healthcare provider right away if you notice fevers or flu-like symptoms with a rash. The rash may be red or purple and then turn into blisters or peeling of the skin. Or, you might notice a red rash with swelling of the face, lips or lymph nodes in your neck or under your arms.  You may get drowsy or dizzy. Do not drive, use machinery, or do anything that needs mental alertness until you know how this medicine affects you. Do not stand or sit up quickly, especially if you are an older patient. This reduces the risk of dizzy or fainting spells.  Drink plenty of water while you are taking this medicine. This will help to reduce the risk of getting gout or kidney stones.  What side effects may I notice from receiving this medicine?  Side effects that you should report to your doctor or health care professional as soon as possible:  · allergic reactions like skin rash, itching or hives, swelling of the face, lips, or tongue  · breathing problems  · joint pain  · muscle pain  · rash, fever, and swollen lymph nodes  · redness, blistering, peeling, or loosening of the skin, including inside the mouth  · signs and symptoms of infection like fever or chills; cough; sore throat  · signs and symptoms of kidney injury like trouble passing urine or change in the amount of urine, flank pain  · tingling, numbness in the hands or feet  · unusual bleeding or bruising  · unusually weak or tired  Side effects that usually do not require medical attention (report to your doctor or health care professional if they continue or are bothersome):  · changes in taste  · diarrhea  · drowsiness  · headache  · nausea, vomiting  · stomach upset  This list may not describe all possible side effects. Call your doctor  for medical advice about side effects. You may report side effects to FDA at 8-276-LGZ-2778.  Where should I keep my medicine?  This drug is given in a hospital or clinic and will not be stored at home.  NOTE: This sheet is a summary. It may not cover all possible information. If you have questions about this medicine, talk to your doctor, pharmacist, or health care provider.  © 2020 Elsevier/Gold Standard (2020-03-10 09:38:12)    Uric Acid Nephropathy    Uric acid is a chemical compound that is made when your body digests some kinds of food and also when your body breaks down dead cells. It is a waste product that is normally removed from your body by your kidneys. If you have too much uric acid in your blood, it can build up in your kidneys and cause damage (nephropathy).  There are two types of uric acid nephropathy:  · Sudden (acute) uric acid nephropathy results from a sudden buildup of uric acid.  · Long-term (chronic) uric acid nephropathy results from a slow buildup of uric acid over a long period of time.  What are the causes?  The exact cause of this condition may depend on the type of uric acid nephropathy:  · Acute uric acid nephropathy may be caused by:  ? Receiving medicines for the treatment of cancer (chemotherapy). Use of these medicines causes rapid breakdown of cells. The cell breakdown produces excess uric acid. As uric acid builds up in your kidneys, it causes an increase of pressure and a loss of blood supply. This makes your kidneys less able to filter blood and make urine.  ? A tumor (cancer) in the body.  ? Seizures.  ? Taking medicines that can cause excess uric acid. Examples are aspirin, water pills (diuretics), and medicines that are prescribed after an organ transplant.  ? Severe diarrhea, which causes fluid loss (dehydration).  · Chronic uric acid nephropathy may happen if you have high levels of uric acid in your body on a regular basis. One reason you may have high levels of uric  acid is gout. With gout, excess uric acid forms into crystals. These crystals can get stuck inside joints and cause painful swelling. They may also build up in your kidneys and cause long-term damage.  What increases the risk?  You may be more likely to develop this condition if you:  · Are male.  · Are 50 years old or older.  · Have gout.  · Eat a lot of foods that are high in certain natural chemical compounds (purines). Shellfish and red meat contain a lot of purines.  · Drink alcohol.  · Have recently had heart surgery.  What are the signs or symptoms?  Signs and symptoms depend on the type of nephropathy that you have. They may include:  · Decreased urine output.  · Nausea and vomiting.  · Lack of energy.  · Seizures.  · Blood-tinged urine.  · Pain when passing urine.  · Pain in the sides of the lower back (flank pain).  In some cases, there are no symptoms.  How is this diagnosed?  Your health care provider may suspect uric acid nephropathy from your signs and symptoms, especially if you have gout. He or she may:  · Do a physical exam.  · Order tests to confirm the diagnosis. Tests may include:  ? Blood and urine tests. This is the best way to measure high levels of uric acid.  ? Imaging studies to check for kidney stones or kidney damage. These may include:  § X-rays.  § Ultrasound.  § CT scan.  § MRI.  How is this treated?  The goal of treatment is to lower the level of uric acid in your body and prevent kidney damage. This can be done by:  · Taking medicines that block the production of uric acid. The most commonly used medicine is allopurinol. If you are starting chemotherapy, ask your health care provider if you should start taking a medicine to prevent high uric acid.  · Starting a diet plan that lowers your intake of purines. Work with a diet and nutrition specialist (dietitian) to limit your intake of foods and drinks that increase uric acid.  · Preventing uric acid buildup. Drink plenty of water to  maintain a good flow of urine and to lower the acidity of your urine. You may also need to take a medicine called bicarbonate.  · Resting the kidneys. This can be done by using a machine to clean your blood (hemodialysis), if necessary. In hemodialysis, your blood is removed, passed through a filtering machine, and then returned to your body. Several sessions of hemodialysis usually improve kidney function by removing uric acid.  Follow these instructions at home:  Eating and drinking         · Drink enough fluid to keep your urine pale yellow.  · Do not drink alcohol.  · Do not drink beverages that contain a type of sugar called fructose.  · Limit how much red meat and shellfish you eat.  · Include plenty of low-fat dairy foods in your diet.  General instructions  · Take over-the-counter and prescription medicines only as told by your health care provider.  · Maintain a healthy weight. Lose weight as directed by your health care provider.  · Keep all follow-up visits as told by your health care provider. This is important.  Contact a health care provider if you:  · Feel tired and have low energy, even when you get enough sleep.  · Have pain when passing urine.  · Have nausea or vomiting.  Get help right away if you:  · Produce very little urine, even when you drink enough fluids.  · Have blood in your urine.  · Have a seizure.  Summary  · Uric acid is a waste product that is normally removed from your body by your kidneys. If you have too much uric acid in your blood, it can build up in your kidneys and cause damage (nephropathy).  · Sudden (acute) uric acid nephropathy results from a sudden buildup of uric acid. Long-term (chronic) uric acid nephropathy results from a slow buildup of uric acid over a long period of time. This may happen if you have gout.  · The goal of treatment is to lower the level of uric acid in your body and prevent kidney damage.  This information is not intended to replace advice given to  you by your health care provider. Make sure you discuss any questions you have with your health care provider.  Document Revised: 04/07/2020 Document Reviewed: 01/08/2019  [employer]vier Patient Education © 2020 [employer]    Low-Purine Eating Plan  A low-purine eating plan involves making food choices to limit your intake of purine. Purine is a kind of uric acid. Too much uric acid in your blood can cause certain conditions, such as gout and kidney stones. Eating a low-purine diet can help control these conditions.  What are tips for following this plan?  Reading food labels    · Avoid foods with saturated or Trans fat.  · Check the ingredient list of grains-based foods, such as bread and cereal, to make sure that they contain whole grains.  · Check the ingredient list of sauces or soups to make sure they do not contain meat or fish.  · When choosing soft drinks, check the ingredient list to make sure they do not contain high-fructose corn syrup.  Shopping  · Buy plenty of fresh fruits and vegetables.  · Avoid buying canned or fresh fish.  · Buy dairy products labeled as low-fat or nonfat.  · Avoid buying premade or processed foods. These foods are often high in fat, salt (sodium), and added sugar.  Cooking  · Use olive oil instead of butter when cooking. Oils like olive oil, canola oil, and sunflower oil contain healthy fats.  Meal planning  · Learn which foods do or do not affect you. If you find out that a food tends to cause your gout symptoms to flare up, avoid eating that food. You can enjoy foods that do not cause problems. If you have any questions about a food item, talk with your dietitian or health care provider.  · Limit foods high in fat, especially saturated fat. Fat makes it harder for your body to get rid of uric acid.  · Choose foods that are lower in fat and are lean sources of protein.  General guidelines  · Limit alcohol intake to no more than 1 drink a day for nonpregnant women and 2 drinks a day  for men. One drink equals 12 oz of beer, 5 oz of wine, or 1½ oz of hard liquor. Alcohol can affect the way your body gets rid of uric acid.  · Drink plenty of water to keep your urine clear or pale yellow. Fluids can help remove uric acid from your body.  · If directed by your health care provider, take a vitamin C supplement.  · Work with your health care provider and dietitian to develop a plan to achieve or maintain a healthy weight. Losing weight can help reduce uric acid in your blood.  What foods are recommended?  The items listed may not be a complete list. Talk with your dietitian about what dietary choices are best for you.  Foods low in purines  Foods low in purines do not need to be limited. These include:  · All fruits.  · All low-purine vegetables, pickles, and olives.  · Breads, pasta, rice, cornbread, and popcorn. Cake and other baked goods.  · All dairy foods.  · Eggs, nuts, and nut butters.  · Spices and condiments, such as salt, herbs, and vinegar.  · Plant oils, butter, and margarine.  · Water, sugar-free soft drinks, tea, coffee, and cocoa.  · Vegetable-based soups, broths, sauces, and gravies.  Foods moderate in purines  Foods moderate in purines should be limited to the amounts listed.  · ½ cup of asparagus, cauliflower, spinach, mushrooms, or green peas, each day.  · 2/3 cup uncooked oatmeal, each day.  · ¼ cup dry wheat bran or wheat germ, each day.  · 2-3 ounces of meat or poultry, each day.  · 4-6 ounces of shellfish, such as crab, lobster, oysters, or shrimp, each day.  · 1 cup cooked beans, peas, or lentils, each day.  · Soup, broths, or bouillon made from meat or fish. Limit these foods as much as possible.  What foods are not recommended?  The items listed may not be a complete list. Talk with your dietitian about what dietary choices are best for you.  Limit your intake of foods high in purines, including:  · Beer and other alcohol.  · Meat-based gravy or sauce.  · Canned or fresh  fish, such as:  ? Anchovies, sardines, herring, and tuna.  ? Mussels and scallops.  ? Codfish, trout, and tracy.  · [place].  · Organ meats, such as:  ? Liver or kidney.  ? Tripe.  ? Sweetbreads (thymus gland or pancreas).  · Wild game or goose.  · Yeast or yeast extract supplements.  · Drinks sweetened with high-fructose corn syrup.  Summary  · Eating a low-purine diet can help control conditions caused by too much uric acid in the body, such as gout or kidney stones.  · Choose low-purine foods, limit alcohol, and limit foods high in fat.  · You will learn over time which foods do or do not affect you. If you find out that a food tends to cause your gout symptoms to flare up, avoid eating that food.  This information is not intended to replace advice given to you by your health care provider. Make sure you discuss any questions you have with your health care provider.  Document Revised: 11/30/2018 Document Reviewed: 01/31/2018  [employer]vier Patient Education © 2020 [employer]

## 2021-01-08 NOTE — PATIENT INSTRUCTIONS
Allopurinol 100 mg once daily to treat uric acid  Decrease dietary fat to lower cholesterol  Continue all other meds the same    Cholesterol Content in Foods  Cholesterol is a waxy, fat-like substance that helps to carry fat in the blood. The body needs cholesterol in small amounts, but too much cholesterol can cause damage to the arteries and heart.  Most people should eat less than 200 milligrams (mg) of cholesterol a day.  Foods with cholesterol    Cholesterol is found in animal-based foods, such as meat, seafood, and dairy. Generally, low-fat dairy and lean meats have less cholesterol than full-fat dairy and fatty meats. The milligrams of cholesterol per serving (mg per serving) of common cholesterol-containing foods are listed below.  Meat and other proteins  · Egg -- one large whole egg has 186 mg.  · Veal shank -- 4 oz has 141 mg.  · Lean ground turkey (93% lean) -- 4 oz has 118 mg.  · Fat-trimmed lamb loin -- 4 oz has 106 mg.  · Lean ground beef (90% lean) -- 4 oz has 100 mg.  · Lobster -- 3.5 oz has 90 mg.  · Pork loin chops -- 4 oz has 86 mg.  · Canned salmon -- 3.5 oz has 83 mg.  · Fat-trimmed beef top loin -- 4 oz has 78 mg.  · Frankfurter -- 1 kasia (3.5 oz) has 77 mg.  · Crab -- 3.5 oz has 71 mg.  · Roasted chicken without skin, white meat -- 4 oz has 66 mg.  · Light bologna -- 2 oz has 45 mg.  · Deli-cut turkey -- 2 oz has 31 mg.  · Canned tuna -- 3.5 oz has 31 mg.  · Sheppard -- 1 oz has 29 mg.  · Oysters and mussels (raw) -- 3.5 oz has 25 mg.  · Mackerel -- 1 oz has 22 mg.  · Trout -- 1 oz has 20 mg.  · Pork sausage -- 1 link (1 oz) has 17 mg.  · Weikert -- 1 oz has 16 mg.  · Tilapia -- 1 oz has 14 mg.  Dairy  · Soft-serve ice cream -- ½ cup (4 oz) has 103 mg.  · Whole-milk yogurt -- 1 cup (8 oz) has 29 mg.  · Cheddar cheese -- 1 oz has 28 mg.  · American cheese -- 1 oz has 28 mg.  · Whole milk -- 1 cup (8 oz) has 23 mg.  · 2% milk -- 1 cup (8 oz) has 18 mg.  · Cream cheese -- 1 tablespoon (Tbsp) has 15  mg.  · Cottage cheese -- ½ cup (4 oz) has 14 mg.  · Low-fat (1%) milk -- 1 cup (8 oz) has 10 mg.  · Sour cream -- 1 Tbsp has 8.5 mg.  · Low-fat yogurt -- 1 cup (8 oz) has 8 mg.  · Nonfat Greek yogurt -- 1 cup (8 oz) has 7 mg.  · Half-and-half cream -- 1 Tbsp has 5 mg.  Fats and oils  · Cod liver oil -- 1 tablespoon (Tbsp) has 82 mg.  · Butter -- 1 Tbsp has 15 mg.  · Lard -- 1 Tbsp has 14 mg.  · Sheppard grease -- 1 Tbsp has 14 mg.  · Mayonnaise -- 1 Tbsp has 5-10 mg.  · Margarine -- 1 Tbsp has 3-10 mg.  Exact amounts of cholesterol in these foods may vary depending on specific ingredients and brands.  Foods without cholesterol  Most plant-based foods do not have cholesterol unless you combine them with a food that has cholesterol. Foods without cholesterol include:  · Grains and cereals.  · Vegetables.  · Fruits.  · Vegetable oils, such as olive, canola, and sunflower oil.  · Legumes, such as peas, beans, and lentils.  · Nuts and seeds.  · Egg whites.  Summary  · The body needs cholesterol in small amounts, but too much cholesterol can cause damage to the arteries and heart.  · Most people should eat less than 200 milligrams (mg) of cholesterol a day.  This information is not intended to replace advice given to you by your health care provider. Make sure you discuss any questions you have with your health care provider.  Document Revised: 11/30/2018 Document Reviewed: 08/14/2018  [employer] Patient Education © 2020 [employer]  Allopurinol injection  What is this medicine?  ALLOPURINOL (al oh PURE i nole) reduces the amount of uric acid the body makes during chemotherapy. Too much uric acid in the blood can cause damage to your kidneys.  This medicine may be used for other purposes; ask your health care provider or pharmacist if you have questions.  COMMON BRAND NAME(S): Aloprim  What should I tell my health care provider before I take this medicine?  They need to know if you have any of these conditions:  · kidney  disease  · liver disease  · an unusual or allergic reaction to allopurinol, other medicines, foods, dyes, or preservatives  · pregnant or trying to get pregnant  · breast feeding  How should I use this medicine?  The medicine is for infusion into a vein. It is given by a health care professional in a hospital or clinic setting.  Talk to your pediatrician regarding the use of this medicine in children. Special care may be needed.  Overdosage: If you think you have taken too much of this medicine contact a poison control center or emergency room at once.  NOTE: This medicine is only for you. Do not share this medicine with others.  What if I miss a dose?  This does not apply.  What may interact with this medicine?  Do not take this medicine with the following medication:  · didanosine, ddI  This medicine may also interact with the following medications:  · certain antibiotics like amoxicillin, ampicillin  · certain medicines for cancer  · certain medicines for immunosuppression like azathioprine, cyclosporine, mercaptopurine  · chlorpropamide  · probenecid  · thiazide diuretics, like hydrochlorothiazide  · sulfinpyrazone  · warfarin  This list may not describe all possible interactions. Give your health care provider a list of all the medicines, herbs, non-prescription drugs, or dietary supplements you use. Also tell them if you smoke, drink alcohol, or use illegal drugs. Some items may interact with your medicine.  What should I watch for while using this medicine?  Your condition will be monitored carefully while you are receiving this medicine.  This medicine may cause serious skin reactions. They can happen weeks to months after starting the medicine. Contact your healthcare provider right away if you notice fevers or flu-like symptoms with a rash. The rash may be red or purple and then turn into blisters or peeling of the skin. Or, you might notice a red rash with swelling of the face, lips or lymph nodes in your  neck or under your arms.  You may get drowsy or dizzy. Do not drive, use machinery, or do anything that needs mental alertness until you know how this medicine affects you. Do not stand or sit up quickly, especially if you are an older patient. This reduces the risk of dizzy or fainting spells.  Drink plenty of water while you are taking this medicine. This will help to reduce the risk of getting gout or kidney stones.  What side effects may I notice from receiving this medicine?  Side effects that you should report to your doctor or health care professional as soon as possible:  · allergic reactions like skin rash, itching or hives, swelling of the face, lips, or tongue  · breathing problems  · joint pain  · muscle pain  · rash, fever, and swollen lymph nodes  · redness, blistering, peeling, or loosening of the skin, including inside the mouth  · signs and symptoms of infection like fever or chills; cough; sore throat  · signs and symptoms of kidney injury like trouble passing urine or change in the amount of urine, flank pain  · tingling, numbness in the hands or feet  · unusual bleeding or bruising  · unusually weak or tired  Side effects that usually do not require medical attention (report to your doctor or health care professional if they continue or are bothersome):  · changes in taste  · diarrhea  · drowsiness  · headache  · nausea, vomiting  · stomach upset  This list may not describe all possible side effects. Call your doctor for medical advice about side effects. You may report side effects to FDA at 1-952-HCK-6118.  Where should I keep my medicine?  This drug is given in a hospital or clinic and will not be stored at home.  NOTE: This sheet is a summary. It may not cover all possible information. If you have questions about this medicine, talk to your doctor, pharmacist, or health care provider.  © 2020 [employer] (2020-03-10 09:38:12)    Uric Acid Nephropathy    Uric acid is a chemical  compound that is made when your body digests some kinds of food and also when your body breaks down dead cells. It is a waste product that is normally removed from your body by your kidneys. If you have too much uric acid in your blood, it can build up in your kidneys and cause damage (nephropathy).  There are two types of uric acid nephropathy:  · Sudden (acute) uric acid nephropathy results from a sudden buildup of uric acid.  · Long-term (chronic) uric acid nephropathy results from a slow buildup of uric acid over a long period of time.  What are the causes?  The exact cause of this condition may depend on the type of uric acid nephropathy:  · Acute uric acid nephropathy may be caused by:  ? Receiving medicines for the treatment of cancer (chemotherapy). Use of these medicines causes rapid breakdown of cells. The cell breakdown produces excess uric acid. As uric acid builds up in your kidneys, it causes an increase of pressure and a loss of blood supply. This makes your kidneys less able to filter blood and make urine.  ? A tumor (cancer) in the body.  ? Seizures.  ? Taking medicines that can cause excess uric acid. Examples are aspirin, water pills (diuretics), and medicines that are prescribed after an organ transplant.  ? Severe diarrhea, which causes fluid loss (dehydration).  · Chronic uric acid nephropathy may happen if you have high levels of uric acid in your body on a regular basis. One reason you may have high levels of uric acid is gout. With gout, excess uric acid forms into crystals. These crystals can get stuck inside joints and cause painful swelling. They may also build up in your kidneys and cause long-term damage.  What increases the risk?  You may be more likely to develop this condition if you:  · Are male.  · Are 50 years old or older.  · Have gout.  · Eat a lot of foods that are high in certain natural chemical compounds (purines). Shellfish and red meat contain a lot of purines.  · Drink  alcohol.  · Have recently had heart surgery.  What are the signs or symptoms?  Signs and symptoms depend on the type of nephropathy that you have. They may include:  · Decreased urine output.  · Nausea and vomiting.  · Lack of energy.  · Seizures.  · Blood-tinged urine.  · Pain when passing urine.  · Pain in the sides of the lower back (flank pain).  In some cases, there are no symptoms.  How is this diagnosed?  Your health care provider may suspect uric acid nephropathy from your signs and symptoms, especially if you have gout. He or she may:  · Do a physical exam.  · Order tests to confirm the diagnosis. Tests may include:  ? Blood and urine tests. This is the best way to measure high levels of uric acid.  ? Imaging studies to check for kidney stones or kidney damage. These may include:  § X-rays.  § Ultrasound.  § CT scan.  § MRI.  How is this treated?  The goal of treatment is to lower the level of uric acid in your body and prevent kidney damage. This can be done by:  · Taking medicines that block the production of uric acid. The most commonly used medicine is allopurinol. If you are starting chemotherapy, ask your health care provider if you should start taking a medicine to prevent high uric acid.  · Starting a diet plan that lowers your intake of purines. Work with a diet and nutrition specialist (dietitian) to limit your intake of foods and drinks that increase uric acid.  · Preventing uric acid buildup. Drink plenty of water to maintain a good flow of urine and to lower the acidity of your urine. You may also need to take a medicine called bicarbonate.  · Resting the kidneys. This can be done by using a machine to clean your blood (hemodialysis), if necessary. In hemodialysis, your blood is removed, passed through a filtering machine, and then returned to your body. Several sessions of hemodialysis usually improve kidney function by removing uric acid.  Follow these instructions at home:  Eating and  drinking         · Drink enough fluid to keep your urine pale yellow.  · Do not drink alcohol.  · Do not drink beverages that contain a type of sugar called fructose.  · Limit how much red meat and shellfish you eat.  · Include plenty of low-fat dairy foods in your diet.  General instructions  · Take over-the-counter and prescription medicines only as told by your health care provider.  · Maintain a healthy weight. Lose weight as directed by your health care provider.  · Keep all follow-up visits as told by your health care provider. This is important.  Contact a health care provider if you:  · Feel tired and have low energy, even when you get enough sleep.  · Have pain when passing urine.  · Have nausea or vomiting.  Get help right away if you:  · Produce very little urine, even when you drink enough fluids.  · Have blood in your urine.  · Have a seizure.  Summary  · Uric acid is a waste product that is normally removed from your body by your kidneys. If you have too much uric acid in your blood, it can build up in your kidneys and cause damage (nephropathy).  · Sudden (acute) uric acid nephropathy results from a sudden buildup of uric acid. Long-term (chronic) uric acid nephropathy results from a slow buildup of uric acid over a long period of time. This may happen if you have gout.  · The goal of treatment is to lower the level of uric acid in your body and prevent kidney damage.  This information is not intended to replace advice given to you by your health care provider. Make sure you discuss any questions you have with your health care provider.  Document Revised: 04/07/2020 Document Reviewed: 01/08/2019  Elsevier Patient Education © 2020 [employer]    Low-Purine Eating Plan  A low-purine eating plan involves making food choices to limit your intake of purine. Purine is a kind of uric acid. Too much uric acid in your blood can cause certain conditions, such as gout and kidney stones. Eating a low-purine  diet can help control these conditions.  What are tips for following this plan?  Reading food labels    · Avoid foods with saturated or Trans fat.  · Check the ingredient list of grains-based foods, such as bread and cereal, to make sure that they contain whole grains.  · Check the ingredient list of sauces or soups to make sure they do not contain meat or fish.  · When choosing soft drinks, check the ingredient list to make sure they do not contain high-fructose corn syrup.  Shopping  · Buy plenty of fresh fruits and vegetables.  · Avoid buying canned or fresh fish.  · Buy dairy products labeled as low-fat or nonfat.  · Avoid buying premade or processed foods. These foods are often high in fat, salt (sodium), and added sugar.  Cooking  · Use olive oil instead of butter when cooking. Oils like olive oil, canola oil, and sunflower oil contain healthy fats.  Meal planning  · Learn which foods do or do not affect you. If you find out that a food tends to cause your gout symptoms to flare up, avoid eating that food. You can enjoy foods that do not cause problems. If you have any questions about a food item, talk with your dietitian or health care provider.  · Limit foods high in fat, especially saturated fat. Fat makes it harder for your body to get rid of uric acid.  · Choose foods that are lower in fat and are lean sources of protein.  General guidelines  · Limit alcohol intake to no more than 1 drink a day for nonpregnant women and 2 drinks a day for men. One drink equals 12 oz of beer, 5 oz of wine, or 1½ oz of hard liquor. Alcohol can affect the way your body gets rid of uric acid.  · Drink plenty of water to keep your urine clear or pale yellow. Fluids can help remove uric acid from your body.  · If directed by your health care provider, take a vitamin C supplement.  · Work with your health care provider and dietitian to develop a plan to achieve or maintain a healthy weight. Losing weight can help reduce uric  acid in your blood.  What foods are recommended?  The items listed may not be a complete list. Talk with your dietitian about what dietary choices are best for you.  Foods low in purines  Foods low in purines do not need to be limited. These include:  · All fruits.  · All low-purine vegetables, pickles, and olives.  · Breads, pasta, rice, cornbread, and popcorn. Cake and other baked goods.  · All dairy foods.  · Eggs, nuts, and nut butters.  · Spices and condiments, such as salt, herbs, and vinegar.  · Plant oils, butter, and margarine.  · Water, sugar-free soft drinks, tea, coffee, and cocoa.  · Vegetable-based soups, broths, sauces, and gravies.  Foods moderate in purines  Foods moderate in purines should be limited to the amounts listed.  · ½ cup of asparagus, cauliflower, spinach, mushrooms, or green peas, each day.  · 2/3 cup uncooked oatmeal, each day.  · ¼ cup dry wheat bran or wheat germ, each day.  · 2-3 ounces of meat or poultry, each day.  · 4-6 ounces of shellfish, such as crab, lobster, oysters, or shrimp, each day.  · 1 cup cooked beans, peas, or lentils, each day.  · Soup, broths, or bouillon made from meat or fish. Limit these foods as much as possible.  What foods are not recommended?  The items listed may not be a complete list. Talk with your dietitian about what dietary choices are best for you.  Limit your intake of foods high in purines, including:  · Beer and other alcohol.  · Meat-based gravy or sauce.  · Canned or fresh fish, such as:  ? Anchovies, sardines, herring, and tuna.  ? Mussels and scallops.  ? Codfish, trout, and tracy.  · Sheppard.  · Organ meats, such as:  ? Liver or kidney.  ? Tripe.  ? Sweetbreads (thymus gland or pancreas).  · Wild game or goose.  · Yeast or yeast extract supplements.  · Drinks sweetened with high-fructose corn syrup.  Summary  · Eating a low-purine diet can help control conditions caused by too much uric acid in the body, such as gout or kidney  stones.  · Choose low-purine foods, limit alcohol, and limit foods high in fat.  · You will learn over time which foods do or do not affect you. If you find out that a food tends to cause your gout symptoms to flare up, avoid eating that food.  This information is not intended to replace advice given to you by your health care provider. Make sure you discuss any questions you have with your health care provider.  Document Revised: 11/30/2018 Document Reviewed: 01/31/2018  [employer]vier Patient Education © 2020 [employer]

## 2021-02-17 ENCOUNTER — OFFICE VISIT (OUTPATIENT)
Dept: OBSTETRICS AND GYNECOLOGY | Age: 61
End: 2021-02-17

## 2021-02-17 ENCOUNTER — PROCEDURE VISIT (OUTPATIENT)
Dept: OBSTETRICS AND GYNECOLOGY | Age: 61
End: 2021-02-17

## 2021-02-17 ENCOUNTER — APPOINTMENT (OUTPATIENT)
Dept: WOMENS IMAGING | Facility: HOSPITAL | Age: 61
End: 2021-02-17

## 2021-02-17 VITALS
SYSTOLIC BLOOD PRESSURE: 130 MMHG | DIASTOLIC BLOOD PRESSURE: 84 MMHG | WEIGHT: 247 LBS | BODY MASS INDEX: 45.45 KG/M2 | HEIGHT: 62 IN

## 2021-02-17 DIAGNOSIS — Z12.31 VISIT FOR SCREENING MAMMOGRAM: Primary | ICD-10-CM

## 2021-02-17 DIAGNOSIS — Z01.419 WELL WOMAN EXAM WITH ROUTINE GYNECOLOGICAL EXAM: Primary | ICD-10-CM

## 2021-02-17 DIAGNOSIS — E66.01 MORBIDLY OBESE (HCC): ICD-10-CM

## 2021-02-17 PROCEDURE — 77067 SCR MAMMO BI INCL CAD: CPT | Performed by: PHYSICIAN ASSISTANT

## 2021-02-17 PROCEDURE — 99396 PREV VISIT EST AGE 40-64: CPT | Performed by: PHYSICIAN ASSISTANT

## 2021-02-17 NOTE — PROGRESS NOTES
"Subjective     Chief Complaint   Patient presents with   • Gynecologic Exam     annual exam last pap 2019 neg/neg, m/g today, c/scope , dexa 2019       History of Present Illness    Laura Cerda is a 61 y.o.  who presents for annual exam.      Pt is doing well    She has noted weight gain  Plans to get on her stationary bike soon    Did have TKR last year  Had to do home PT and recovered well    Sees PCP routinely   is retiring today-worked at [employer] for 25 years    Pt of Dr Echevarria      Obstetric History:  OB History        2    Para   2    Term   2            AB        Living   2       SAB        TAB        Ectopic        Molar        Multiple        Live Births   2               Menstrual History:     No LMP recorded. Patient is postmenopausal.         Current contraception: post menopausal status  History of abnormal Pap smear: no  Received Gardasil immunization: no  Perform regular self breast exam: yes - mthly  Family history of uterine or ovarian cancer: no  Family History of colon cancer: no  Family history of breast cancer: no    Mammogram: done today.  Colonoscopy: up to date.  DEXA: up to date.    Exercise: not active  Calcium/Vitamin D: adequate intake    The following portions of the patient's history were reviewed and updated as appropriate: allergies, current medications, past family history, past medical history, past social history, past surgical history and problem list.    Review of Systems   All other systems reviewed and are negative.      Review of Systems   Constitutional: Negative for fatigue.   Respiratory: Negative for shortness of breath.    Gastrointestinal: Negative for abdominal pain.   Genitourinary: Negative for dysuria.   Neurological: Negative for headaches.   Psychiatric/Behavioral: Negative for dysphoric mood.         Objective   Physical Exam    /84   Ht 157.5 cm (62\")   Wt 112 kg (247 lb)   Breastfeeding No   BMI 45.18 kg/m² "   General:   alert, comfortable and no distress   Heart: regular rate and rhythm   Lungs: clear to auscultation bilaterally   Breast: normal appearance, no masses or tenderness, Inspection negative, No nipple retraction or dimpling, No nipple discharge or bleeding, No axillary or supraclavicular adenopathy, Normal to palpation without dominant masses   Neck: no adenopathy and no carotid bruit   Abdomen: {normal findings: soft, non-tender   CVA: Not performed today   Pelvis: External genitalia: normal general appearance  Vaginal: normal mucosa without prolapse or lesions  Cervix: normal appearance  Adnexa: normal bimanual exam  Uterus: normal single, nontender  Exam limited by body habitus   Extremities: Not performed today   Neurologic: negative   Psychiatric: Normal affect, judgement, and mood     Assessment/Plan   Diagnoses and all orders for this visit:    1. Well woman exam with routine gynecological exam (Primary)    2. Morbidly obese (CMS/HCC)        All questions answered.  Breast self exam technique reviewed and patient encouraged to perform self-exam monthly.  Discussed healthy lifestyle modifications.  Recommended 30 minutes of aerobic exercise five times per week.  Discussed calcium needs to prevent osteoporosis.    Pap utd  Mammogram done today   CSC utd  DEXA utd  Enc diet and exercise

## 2021-03-19 ENCOUNTER — BULK ORDERING (OUTPATIENT)
Dept: CASE MANAGEMENT | Facility: OTHER | Age: 61
End: 2021-03-19

## 2021-03-19 DIAGNOSIS — Z23 IMMUNIZATION DUE: ICD-10-CM

## 2021-04-01 ENCOUNTER — TELEPHONE (OUTPATIENT)
Dept: FAMILY MEDICINE CLINIC | Facility: CLINIC | Age: 61
End: 2021-04-01

## 2021-04-02 ENCOUNTER — TELEPHONE (OUTPATIENT)
Dept: FAMILY MEDICINE CLINIC | Facility: CLINIC | Age: 61
End: 2021-04-02

## 2021-04-02 NOTE — TELEPHONE ENCOUNTER
Caller: DR. [facility]     Relationship: Provider    Best call back number: [phone]    What orders are you requesting (i.e. lab or imaging): NONE    Where will you receive your lab/imaging services: MRI    Additional notes: THE PROVIDER, DR. Contreras AT [facility] IS CALLING TO STATE THAT THEY CAN TAKE CARE OF THE ORDER OF THE MRI FOR THE PATIENT. RETURN CALL IF THERE ARE ANY ISSUES.

## 2021-04-19 DIAGNOSIS — E11.8 CONTROLLED TYPE 2 DIABETES MELLITUS WITH COMPLICATION, WITHOUT LONG-TERM CURRENT USE OF INSULIN (HCC): Primary | ICD-10-CM

## 2021-04-19 DIAGNOSIS — E55.9 VITAMIN D DEFICIENCY: ICD-10-CM

## 2021-04-19 DIAGNOSIS — M10.00 IDIOPATHIC GOUT, UNSPECIFIED CHRONICITY, UNSPECIFIED SITE: ICD-10-CM

## 2021-04-19 DIAGNOSIS — I10 ESSENTIAL HYPERTENSION: ICD-10-CM

## 2021-04-21 ENCOUNTER — LAB (OUTPATIENT)
Dept: FAMILY MEDICINE CLINIC | Facility: CLINIC | Age: 61
End: 2021-04-21

## 2021-04-21 DIAGNOSIS — E11.8 CONTROLLED TYPE 2 DIABETES MELLITUS WITH COMPLICATION, WITHOUT LONG-TERM CURRENT USE OF INSULIN (HCC): ICD-10-CM

## 2021-04-21 DIAGNOSIS — I10 ESSENTIAL HYPERTENSION: ICD-10-CM

## 2021-04-21 DIAGNOSIS — E55.9 VITAMIN D DEFICIENCY: ICD-10-CM

## 2021-04-21 DIAGNOSIS — M10.00 IDIOPATHIC GOUT, UNSPECIFIED CHRONICITY, UNSPECIFIED SITE: ICD-10-CM

## 2021-04-21 LAB
25(OH)D3 SERPL-MCNC: 48.3 NG/ML (ref 30–100)
ALBUMIN SERPL-MCNC: 4.1 G/DL (ref 3.5–5.2)
ALBUMIN/GLOB SERPL: 1.4 G/DL
ALP SERPL-CCNC: 128 U/L (ref 39–117)
ALT SERPL W P-5'-P-CCNC: 22 U/L (ref 1–33)
ANION GAP SERPL CALCULATED.3IONS-SCNC: 12.5 MMOL/L (ref 5–15)
AST SERPL-CCNC: 22 U/L (ref 1–32)
BILIRUB SERPL-MCNC: 0.3 MG/DL (ref 0–1.2)
BUN SERPL-MCNC: 13 MG/DL (ref 8–23)
BUN/CREAT SERPL: 13.5 (ref 7–25)
CALCIUM SPEC-SCNC: 9.6 MG/DL (ref 8.6–10.5)
CHLORIDE SERPL-SCNC: 103 MMOL/L (ref 98–107)
CHOLEST SERPL-MCNC: 179 MG/DL (ref 0–200)
CO2 SERPL-SCNC: 28.5 MMOL/L (ref 22–29)
CREAT SERPL-MCNC: 0.96 MG/DL (ref 0.57–1)
DEPRECATED RDW RBC AUTO: 45.2 FL (ref 37–54)
ERYTHROCYTE [DISTWIDTH] IN BLOOD BY AUTOMATED COUNT: 14.5 % (ref 12.3–15.4)
GFR SERPL CREATININE-BSD FRML MDRD: 59 ML/MIN/1.73
GLOBULIN UR ELPH-MCNC: 2.9 GM/DL
GLUCOSE SERPL-MCNC: 100 MG/DL (ref 65–99)
HBA1C MFR BLD: 6.45 % (ref 4.8–5.6)
HCT VFR BLD AUTO: 39.9 % (ref 34–46.6)
HDLC SERPL-MCNC: 61 MG/DL (ref 40–60)
HGB BLD-MCNC: 12.8 G/DL (ref 12–15.9)
LDLC SERPL CALC-MCNC: 91 MG/DL (ref 0–100)
LDLC/HDLC SERPL: 1.41 {RATIO}
MCH RBC QN AUTO: 27.2 PG (ref 26.6–33)
MCHC RBC AUTO-ENTMCNC: 32.1 G/DL (ref 31.5–35.7)
MCV RBC AUTO: 84.9 FL (ref 79–97)
PLATELET # BLD AUTO: 215 10*3/MM3 (ref 140–450)
PMV BLD AUTO: 12.5 FL (ref 6–12)
POTASSIUM SERPL-SCNC: 4.2 MMOL/L (ref 3.5–5.2)
PROT SERPL-MCNC: 7 G/DL (ref 6–8.5)
RBC # BLD AUTO: 4.7 10*6/MM3 (ref 3.77–5.28)
SODIUM SERPL-SCNC: 144 MMOL/L (ref 136–145)
TRIGL SERPL-MCNC: 159 MG/DL (ref 0–150)
URATE SERPL-MCNC: 8.2 MG/DL (ref 2.4–5.7)
VLDLC SERPL-MCNC: 27 MG/DL (ref 5–40)
WBC # BLD AUTO: 9.37 10*3/MM3 (ref 3.4–10.8)

## 2021-04-21 PROCEDURE — 85027 COMPLETE CBC AUTOMATED: CPT | Performed by: INTERNAL MEDICINE

## 2021-04-21 PROCEDURE — 82306 VITAMIN D 25 HYDROXY: CPT | Performed by: INTERNAL MEDICINE

## 2021-04-21 PROCEDURE — 36415 COLL VENOUS BLD VENIPUNCTURE: CPT | Performed by: INTERNAL MEDICINE

## 2021-04-21 PROCEDURE — 80053 COMPREHEN METABOLIC PANEL: CPT | Performed by: INTERNAL MEDICINE

## 2021-04-21 PROCEDURE — 80061 LIPID PANEL: CPT | Performed by: INTERNAL MEDICINE

## 2021-04-21 PROCEDURE — 84550 ASSAY OF BLOOD/URIC ACID: CPT | Performed by: INTERNAL MEDICINE

## 2021-04-21 PROCEDURE — 83036 HEMOGLOBIN GLYCOSYLATED A1C: CPT | Performed by: INTERNAL MEDICINE

## 2021-05-19 ENCOUNTER — OFFICE VISIT (OUTPATIENT)
Dept: FAMILY MEDICINE CLINIC | Facility: CLINIC | Age: 61
End: 2021-05-19

## 2021-05-19 VITALS
RESPIRATION RATE: 14 BRPM | BODY MASS INDEX: 45.64 KG/M2 | HEART RATE: 98 BPM | OXYGEN SATURATION: 97 % | HEIGHT: 62 IN | WEIGHT: 248 LBS | TEMPERATURE: 97.5 F | DIASTOLIC BLOOD PRESSURE: 70 MMHG | SYSTOLIC BLOOD PRESSURE: 110 MMHG

## 2021-05-19 DIAGNOSIS — M10.00 IDIOPATHIC GOUT, UNSPECIFIED CHRONICITY, UNSPECIFIED SITE: ICD-10-CM

## 2021-05-19 DIAGNOSIS — E11.8 CONTROLLED TYPE 2 DIABETES MELLITUS WITH COMPLICATION, WITHOUT LONG-TERM CURRENT USE OF INSULIN (HCC): ICD-10-CM

## 2021-05-19 DIAGNOSIS — E78.00 PURE HYPERCHOLESTEROLEMIA: ICD-10-CM

## 2021-05-19 DIAGNOSIS — M54.40 LOW BACK PAIN WITH SCIATICA, SCIATICA LATERALITY UNSPECIFIED, UNSPECIFIED BACK PAIN LATERALITY, UNSPECIFIED CHRONICITY: Primary | ICD-10-CM

## 2021-05-19 PROBLEM — M54.50 LOW BACK PAIN: Status: ACTIVE | Noted: 2021-05-19

## 2021-05-19 PROBLEM — K21.9 GASTROESOPHAGEAL REFLUX DISEASE: Status: RESOLVED | Noted: 2018-08-17 | Resolved: 2021-05-19

## 2021-05-19 PROCEDURE — 99214 OFFICE O/P EST MOD 30 MIN: CPT | Performed by: INTERNAL MEDICINE

## 2021-05-19 RX ORDER — TRAMADOL HYDROCHLORIDE 50 MG/1
50 TABLET ORAL EVERY 6 HOURS PRN
Qty: 90 TABLET | Refills: 3 | Status: SHIPPED | OUTPATIENT
Start: 2021-05-19 | End: 2021-09-30

## 2021-05-19 RX ORDER — COLCHICINE 0.6 MG/1
0.6 TABLET ORAL DAILY
Qty: 30 TABLET | Refills: 3 | Status: SHIPPED | OUTPATIENT
Start: 2021-05-19 | End: 2021-09-30

## 2021-05-19 RX ORDER — ALLOPURINOL 100 MG/1
100 TABLET ORAL DAILY
Qty: 90 TABLET | Refills: 1 | Status: SHIPPED | OUTPATIENT
Start: 2021-05-19 | End: 2022-02-14

## 2021-05-19 NOTE — PATIENT INSTRUCTIONS
"https://www.nhlbi.nih.gov/files/docs/public/heart/dash_brief.pdf\">   DASH Eating Plan  DASH stands for Dietary Approaches to Stop Hypertension. The DASH eating plan is a healthy eating plan that has been shown to:  · Reduce high blood pressure (hypertension).  · Reduce your risk for type 2 diabetes, heart disease, and stroke.  · Help with weight loss.  What are tips for following this plan?  Reading food labels  · Check food labels for the amount of salt (sodium) per serving. Choose foods with less than 5 percent of the Daily Value of sodium. Generally, foods with less than 300 milligrams (mg) of sodium per serving fit into this eating plan.  · To find whole grains, look for the word \"whole\" as the first word in the ingredient list.  Shopping  · Buy products labeled as \"low-sodium\" or \"no salt added.\"  · Buy fresh foods. Avoid canned foods and pre-made or frozen meals.  Cooking  · Avoid adding salt when cooking. Use salt-free seasonings or herbs instead of table salt or sea salt. Check with your health care provider or pharmacist before using salt substitutes.  · Do not holliday foods. Cook foods using healthy methods such as baking, boiling, grilling, roasting, and broiling instead.  · Cook with heart-healthy oils, such as olive, canola, avocado, soybean, or sunflower oil.  Meal planning    · Eat a balanced diet that includes:  ? 4 or more servings of fruits and 4 or more servings of vegetables each day. Try to fill one-half of your plate with fruits and vegetables.  ? 6-8 servings of whole grains each day.  ? Less than 6 oz (170 g) of lean meat, poultry, or fish each day. A 3-oz (85-g) serving of meat is about the same size as a deck of cards. One egg equals 1 oz (28 g).  ? 2-3 servings of low-fat dairy each day. One serving is 1 cup (237 mL).  ? 1 serving of nuts, seeds, or beans 5 times each week.  ? 2-3 servings of heart-healthy fats. Healthy fats called omega-3 fatty acids are found in foods such as walnuts, " flaxseeds, fortified milks, and eggs. These fats are also found in cold-water fish, such as sardines, salmon, and mackerel.  · Limit how much you eat of:  ? Canned or prepackaged foods.  ? Food that is high in trans fat, such as some fried foods.  ? Food that is high in saturated fat, such as fatty meat.  ? Desserts and other sweets, sugary drinks, and other foods with added sugar.  ? Full-fat dairy products.  · Do not salt foods before eating.  · Do not eat more than 4 egg yolks a week.  · Try to eat at least 2 vegetarian meals a week.  · Eat more home-cooked food and less restaurant, buffet, and fast food.  Lifestyle  · When eating at a restaurant, ask that your food be prepared with less salt or no salt, if possible.  · If you drink alcohol:  ? Limit how much you use to:  § 0-1 drink a day for women who are not pregnant.  § 0-2 drinks a day for men.  ? Be aware of how much alcohol is in your drink. In the [place] one drink equals one 12 oz bottle of beer (355 mL), one 5 oz glass of wine (148 mL), or one 1½ oz glass of hard liquor (44 mL).  General information  · Avoid eating more than 2,300 mg of salt a day. If you have hypertension, you may need to reduce your sodium intake to 1,500 mg a day.  · Work with your health care provider to maintain a healthy body weight or to lose weight. Ask what an ideal weight is for you.  · Get at least 30 minutes of exercise that causes your heart to beat faster (aerobic exercise) most days of the week. Activities may include walking, swimming, or biking.  · Work with your health care provider or dietitian to adjust your eating plan to your individual calorie needs.  What foods should I eat?  Fruits  All fresh, dried, or frozen fruit. Canned fruit in natural juice (without added sugar).  Vegetables  Fresh or frozen vegetables (raw, steamed, roasted, or grilled). Low-sodium or reduced-sodium tomato and vegetable juice. Low-sodium or reduced-sodium tomato sauce and tomato paste.  Low-sodium or reduced-sodium canned vegetables.  Grains  Whole-grain or whole-wheat bread. Whole-grain or whole-wheat pasta. Brown rice. Oatmeal. Quinoa. Bulgur. Whole-grain and low-sodium cereals. [place]omara bread. Low-fat, low-sodium crackers. Whole-wheat flour tortillas.  Meats and other proteins  Skinless chicken or turkey. Ground chicken or turkey. Pork with fat trimmed off. Fish and seafood. Egg whites. Dried beans, peas, or lentils. Unsalted nuts, nut butters, and seeds. Unsalted canned beans. Lean cuts of beef with fat trimmed off. Low-sodium, lean precooked or cured meat, such as sausages or meat loaves.  Dairy  Low-fat (1%) or fat-free (skim) milk. Reduced-fat, low-fat, or fat-free cheeses. Nonfat, low-sodium ricotta or cottage cheese. Low-fat or nonfat yogurt. Low-fat, low-sodium cheese.  Fats and oils  Soft margarine without trans fats. Vegetable oil. Reduced-fat, low-fat, or light mayonnaise and salad dressings (reduced-sodium). Canola, safflower, olive, avocado, soybean, and sunflower oils. Avocado.  Seasonings and condiments  Herbs. Spices. Seasoning mixes without salt.  Other foods  Unsalted popcorn and pretzels. Fat-free sweets.  The items listed above may not be a complete list of foods and beverages you can eat. Contact a dietitian for more information.  What foods should I avoid?  Fruits  Canned fruit in a light or heavy syrup. Fried fruit. Fruit in cream or butter sauce.  Vegetables  Creamed or fried vegetables. Vegetables in a cheese sauce. Regular canned vegetables (not low-sodium or reduced-sodium). Regular canned tomato sauce and paste (not low-sodium or reduced-sodium). Regular tomato and vegetable juice (not low-sodium or reduced-sodium). Pickles. Olives.  Grains  Baked goods made with fat, such as croissants, muffins, or some breads. Dry pasta or rice meal packs.  Meats and other proteins  Fatty cuts of meat. Ribs. Fried meat. [place]ppard. Bologna, salami, and other precooked or cured meats, such as  sausages or meat loaves. Fat from the back of a pig (fatback). Bratwurst. Salted nuts and seeds. Canned beans with added salt. Canned or smoked fish. Whole eggs or egg yolks. Chicken or turkey with skin.  Dairy  Whole or 2% milk, cream, and half-and-half. Whole or full-fat cream cheese. Whole-fat or sweetened yogurt. Full-fat cheese. Nondairy creamers. Whipped toppings. Processed cheese and cheese spreads.  Fats and oils  Butter. Stick margarine. Lard. Shortening. Ghee. Sheppard fat. Tropical oils, such as coconut, palm kernel, or palm oil.  Seasonings and condiments  Onion salt, garlic salt, seasoned salt, table salt, and sea salt. Worcestershire sauce. Tartar sauce. Barbecue sauce. Teriyaki sauce. Soy sauce, including reduced-sodium. Steak sauce. Canned and packaged gravies. Fish sauce. Oyster sauce. Cocktail sauce. Store-bought horseradish. Ketchup. Mustard. Meat flavorings and tenderizers. Bouillon cubes. Hot sauces. Pre-made or packaged marinades. Pre-made or packaged taco seasonings. Relishes. Regular salad dressings.  Other foods  Salted popcorn and pretzels.  The items listed above may not be a complete list of foods and beverages you should avoid. Contact a dietitian for more information.  Where to find more information  · National Heart, Lung, and Blood [place]: www.nhlbi.nih.gov  · American Heart Association: www.heart.org  · Academy of Nutrition and Dietetics: www.eatright.org  · National Kidney Foundation: www.kidney.org  Summary  · The DASH eating plan is a healthy eating plan that has been shown to reduce high blood pressure (hypertension). It may also reduce your risk for type 2 diabetes, heart disease, and stroke.  · When on the DASH eating plan, aim to eat more fresh fruits and vegetables, whole grains, lean proteins, low-fat dairy, and heart-healthy fats.  · With the DASH eating plan, you should limit salt (sodium) intake to 2,300 mg a day. If you have hypertension, you may need to reduce your  sodium intake to 1,500 mg a day.  · Work with your health care provider or dietitian to adjust your eating plan to your individual calorie needs.  This information is not intended to replace advice given to you by your health care provider. Make sure you discuss any questions you have with your health care provider.  Document Revised: 11/20/2020 Document Reviewed: 11/20/2020  [employer] Patient Education © 2021 [employer] Inc.

## 2021-05-19 NOTE — PROGRESS NOTES
Subjective   Laura Cerda is a 61 y.o. female.     Vitals:    05/19/21 1302   BP: 110/70   Pulse: 98   Resp: 14   Temp: 97.5 °F (36.4 °C)   SpO2: 97%      Body mass index is 45.35 kg/m².     History of Present Illness   Patient was seen for low back pain.  Patient has pain in the lower lumbar area.  Pain radiates down both legs.  Pain has been going on for several months.  She was given tramadol 50 mg with 2 Tylenol every 8 hours, ranitidine 2 mg nightly.  Patient was also referred to physical therapy.  Patient will follow up in 1 month.  Patient does have gout and is using allopurinol 100 mg daily.  Patient's uric acid levels drawn today but is pending at the time of dictation.  Patient will increase allopurinol to uric acid level is below 6.  Patient's lipids are treated with diet exercise and atorvastatin 20 mg daily.  Triglycerides 159, HDL 61, LDL 91.  Patient will continue present diet and activity levels.  Patient's blood sugars are running 110s.  Patient's hemoglobin A1c was 6.5%.  Patient will continue present treatment.    Dictated utilizing Dragon dictation. If there are questions or for further clarification, please contact me.  The following portions of the patient's history were reviewed and updated as appropriate: allergies, current medications, past family history, past medical history, past social history, past surgical history and problem list.    Review of Systems   Constitutional: Negative for fatigue and fever.   HENT: Positive for congestion. Negative for trouble swallowing.    Eyes: Negative for discharge and visual disturbance.   Respiratory: Negative for choking and shortness of breath.    Cardiovascular: Negative for chest pain and palpitations.   Gastrointestinal: Negative for abdominal pain and blood in stool.   Endocrine: Negative.    Genitourinary: Negative for genital sores and hematuria.   Musculoskeletal: Positive for back pain. Negative for gait problem and joint swelling.   Skin:  Negative for color change, pallor, rash and wound.   Allergic/Immunologic: Positive for environmental allergies. Negative for immunocompromised state.   Neurological: Negative for facial asymmetry and speech difficulty.   Psychiatric/Behavioral: Negative for hallucinations and suicidal ideas.       Objective   Physical Exam  Vitals and nursing note reviewed.   Constitutional:       Appearance: Normal appearance. She is well-developed.   HENT:      Head: Normocephalic and atraumatic.      Nose: Nose normal.      Mouth/Throat:      Mouth: Mucous membranes are moist.      Pharynx: Oropharynx is clear.   Eyes:      Extraocular Movements: Extraocular movements intact.      Conjunctiva/sclera: Conjunctivae normal.      Pupils: Pupils are equal, round, and reactive to light.   Cardiovascular:      Rate and Rhythm: Normal rate and regular rhythm.      Heart sounds: Normal heart sounds. No murmur heard.   No friction rub. No gallop.    Pulmonary:      Effort: Pulmonary effort is normal. No respiratory distress.      Breath sounds: Normal breath sounds. No stridor. No wheezing, rhonchi or rales.   Chest:      Chest wall: No tenderness.   Abdominal:      General: Bowel sounds are normal.      Palpations: Abdomen is soft.   Musculoskeletal:         General: Swelling and tenderness present. Normal range of motion.      Cervical back: Normal range of motion and neck supple.   Skin:     General: Skin is warm and dry.   Neurological:      General: No focal deficit present.      Mental Status: She is alert and oriented to person, place, and time. Mental status is at baseline.   Psychiatric:         Mood and Affect: Mood normal.         Behavior: Behavior normal.         Thought Content: Thought content normal.         Judgment: Judgment normal.         Assessment/Plan #1 physical therapy #2 tramadol 50 mg with 2 Tylenol No. 3 uric acid level  Problems Addressed this Visit     Cardiac and Vasculature            Hyperlipidemia           Endocrine and Metabolic            Controlled type 2 diabetes mellitus with complication, without long-term current use of insulin (CMS/[facility])          Musculoskeletal and Injuries            Low back pain - Primary    Relevant Medications    traMADol (ULTRAM) 50 MG tablet    Other Relevant Orders    Ambulatory Referral to Physical Therapy Evaluate and treat, Ortho            Other Visit Diagnoses     Idiopathic gout, unspecified chronicity, unspecified site        Relevant Orders    Uric Acid      Diagnoses     Diagnosis Codes Comments    Low back pain with sciatica, sciatica laterality unspecified, unspecified back pain laterality, unspecified chronicity    -  Primary ICD-10-CM: M54.40  ICD-9-CM: 724.3     Controlled type 2 diabetes mellitus with complication, without long-term current use of insulin (CMS/[facility])     ICD-10-CM: E11.8  ICD-9-CM: 250.90     Pure hypercholesterolemia     ICD-10-CM: E78.00  ICD-9-CM: 272.0     Idiopathic gout, unspecified chronicity, unspecified site     ICD-10-CM: M10.00  ICD-9-CM: 274.9

## 2021-05-24 DIAGNOSIS — M48.00 SPINAL STENOSIS, UNSPECIFIED SPINAL REGION: Primary | ICD-10-CM

## 2021-05-26 ENCOUNTER — LAB (OUTPATIENT)
Dept: FAMILY MEDICINE CLINIC | Facility: CLINIC | Age: 61
End: 2021-05-26

## 2021-05-26 LAB — URATE SERPL-MCNC: 7.2 MG/DL (ref 2.4–5.7)

## 2021-05-26 PROCEDURE — 84550 ASSAY OF BLOOD/URIC ACID: CPT | Performed by: INTERNAL MEDICINE

## 2021-05-26 PROCEDURE — 36415 COLL VENOUS BLD VENIPUNCTURE: CPT | Performed by: INTERNAL MEDICINE

## 2021-05-27 RX ORDER — ATORVASTATIN CALCIUM 20 MG/1
20 TABLET, FILM COATED ORAL NIGHTLY
Qty: 30 TABLET | Refills: 1 | Status: SHIPPED | OUTPATIENT
Start: 2021-05-27 | End: 2021-08-09

## 2021-06-01 DIAGNOSIS — E78.00 PURE HYPERCHOLESTEROLEMIA: ICD-10-CM

## 2021-06-01 DIAGNOSIS — E11.8 CONTROLLED TYPE 2 DIABETES MELLITUS WITH COMPLICATION, WITHOUT LONG-TERM CURRENT USE OF INSULIN (HCC): Primary | ICD-10-CM

## 2021-06-01 DIAGNOSIS — M10.00 IDIOPATHIC GOUT, UNSPECIFIED CHRONICITY, UNSPECIFIED SITE: ICD-10-CM

## 2021-06-01 DIAGNOSIS — E55.9 VITAMIN D DEFICIENCY: ICD-10-CM

## 2021-06-01 DIAGNOSIS — I10 ESSENTIAL HYPERTENSION: ICD-10-CM

## 2021-06-02 ENCOUNTER — LAB (OUTPATIENT)
Dept: FAMILY MEDICINE CLINIC | Facility: CLINIC | Age: 61
End: 2021-06-02

## 2021-06-02 DIAGNOSIS — I10 ESSENTIAL HYPERTENSION: ICD-10-CM

## 2021-06-02 DIAGNOSIS — E78.00 PURE HYPERCHOLESTEROLEMIA: ICD-10-CM

## 2021-06-02 DIAGNOSIS — E11.8 CONTROLLED TYPE 2 DIABETES MELLITUS WITH COMPLICATION, WITHOUT LONG-TERM CURRENT USE OF INSULIN (HCC): ICD-10-CM

## 2021-06-02 DIAGNOSIS — E55.9 VITAMIN D DEFICIENCY: ICD-10-CM

## 2021-06-02 DIAGNOSIS — M10.00 IDIOPATHIC GOUT, UNSPECIFIED CHRONICITY, UNSPECIFIED SITE: ICD-10-CM

## 2021-06-02 LAB
25(OH)D3 SERPL-MCNC: 49.3 NG/ML (ref 30–100)
ALBUMIN SERPL-MCNC: 4.2 G/DL (ref 3.5–5.2)
ALBUMIN/GLOB SERPL: 1.4 G/DL
ALP SERPL-CCNC: 143 U/L (ref 39–117)
ALT SERPL W P-5'-P-CCNC: 44 U/L (ref 1–33)
ANION GAP SERPL CALCULATED.3IONS-SCNC: 10.5 MMOL/L (ref 5–15)
AST SERPL-CCNC: 40 U/L (ref 1–32)
BILIRUB SERPL-MCNC: 0.3 MG/DL (ref 0–1.2)
BUN SERPL-MCNC: 10 MG/DL (ref 8–23)
BUN/CREAT SERPL: 10.6 (ref 7–25)
CALCIUM SPEC-SCNC: 9.3 MG/DL (ref 8.6–10.5)
CHLORIDE SERPL-SCNC: 103 MMOL/L (ref 98–107)
CHOLEST SERPL-MCNC: 158 MG/DL (ref 0–200)
CO2 SERPL-SCNC: 27.5 MMOL/L (ref 22–29)
CREAT SERPL-MCNC: 0.94 MG/DL (ref 0.57–1)
DEPRECATED RDW RBC AUTO: 44.5 FL (ref 37–54)
ERYTHROCYTE [DISTWIDTH] IN BLOOD BY AUTOMATED COUNT: 14.7 % (ref 12.3–15.4)
GFR SERPL CREATININE-BSD FRML MDRD: 60 ML/MIN/1.73
GLOBULIN UR ELPH-MCNC: 3.1 GM/DL
GLUCOSE SERPL-MCNC: 152 MG/DL (ref 65–99)
HBA1C MFR BLD: 6.23 % (ref 4.8–5.6)
HCT VFR BLD AUTO: 38.1 % (ref 34–46.6)
HDLC SERPL-MCNC: 48 MG/DL (ref 40–60)
HGB BLD-MCNC: 12.6 G/DL (ref 12–15.9)
LDLC SERPL CALC-MCNC: 78 MG/DL (ref 0–100)
LDLC/HDLC SERPL: 1.51 {RATIO}
MCH RBC QN AUTO: 27.8 PG (ref 26.6–33)
MCHC RBC AUTO-ENTMCNC: 33.1 G/DL (ref 31.5–35.7)
MCV RBC AUTO: 84.1 FL (ref 79–97)
PLATELET # BLD AUTO: 181 10*3/MM3 (ref 140–450)
PMV BLD AUTO: 13.1 FL (ref 6–12)
POTASSIUM SERPL-SCNC: 4 MMOL/L (ref 3.5–5.2)
PROT SERPL-MCNC: 7.3 G/DL (ref 6–8.5)
RBC # BLD AUTO: 4.53 10*6/MM3 (ref 3.77–5.28)
SODIUM SERPL-SCNC: 141 MMOL/L (ref 136–145)
TRIGL SERPL-MCNC: 188 MG/DL (ref 0–150)
URATE SERPL-MCNC: 6 MG/DL (ref 2.4–5.7)
VLDLC SERPL-MCNC: 32 MG/DL (ref 5–40)
WBC # BLD AUTO: 5.79 10*3/MM3 (ref 3.4–10.8)

## 2021-06-02 PROCEDURE — 83036 HEMOGLOBIN GLYCOSYLATED A1C: CPT | Performed by: INTERNAL MEDICINE

## 2021-06-02 PROCEDURE — 80061 LIPID PANEL: CPT | Performed by: INTERNAL MEDICINE

## 2021-06-02 PROCEDURE — 85027 COMPLETE CBC AUTOMATED: CPT | Performed by: INTERNAL MEDICINE

## 2021-06-02 PROCEDURE — 80053 COMPREHEN METABOLIC PANEL: CPT | Performed by: INTERNAL MEDICINE

## 2021-06-02 PROCEDURE — 36415 COLL VENOUS BLD VENIPUNCTURE: CPT | Performed by: INTERNAL MEDICINE

## 2021-06-02 PROCEDURE — 82306 VITAMIN D 25 HYDROXY: CPT | Performed by: INTERNAL MEDICINE

## 2021-06-02 PROCEDURE — 84550 ASSAY OF BLOOD/URIC ACID: CPT | Performed by: INTERNAL MEDICINE

## 2021-06-16 ENCOUNTER — OFFICE VISIT (OUTPATIENT)
Dept: FAMILY MEDICINE CLINIC | Facility: CLINIC | Age: 61
End: 2021-06-16

## 2021-06-16 VITALS
HEIGHT: 62 IN | RESPIRATION RATE: 14 BRPM | WEIGHT: 245 LBS | TEMPERATURE: 98 F | DIASTOLIC BLOOD PRESSURE: 88 MMHG | OXYGEN SATURATION: 99 % | HEART RATE: 106 BPM | SYSTOLIC BLOOD PRESSURE: 132 MMHG | BODY MASS INDEX: 45.08 KG/M2

## 2021-06-16 DIAGNOSIS — E03.9 HYPOTHYROIDISM, UNSPECIFIED TYPE: ICD-10-CM

## 2021-06-16 DIAGNOSIS — E11.8 CONTROLLED TYPE 2 DIABETES MELLITUS WITH COMPLICATION, WITHOUT LONG-TERM CURRENT USE OF INSULIN (HCC): ICD-10-CM

## 2021-06-16 DIAGNOSIS — M1A.09X0 IDIOPATHIC CHRONIC GOUT OF MULTIPLE SITES WITHOUT TOPHUS: ICD-10-CM

## 2021-06-16 DIAGNOSIS — I10 ESSENTIAL HYPERTENSION: Primary | ICD-10-CM

## 2021-06-16 PROCEDURE — 99214 OFFICE O/P EST MOD 30 MIN: CPT | Performed by: INTERNAL MEDICINE

## 2021-06-16 RX ORDER — SITAGLIPTIN 100 MG/1
100 TABLET, FILM COATED ORAL DAILY
Qty: 90 TABLET | Refills: 1 | Status: SHIPPED | OUTPATIENT
Start: 2021-06-16 | End: 2022-01-28 | Stop reason: ALTCHOICE

## 2021-06-16 RX ORDER — LEVOTHYROXINE SODIUM 75 UG/1
75 TABLET ORAL DAILY
Qty: 90 TABLET | Refills: 1 | Status: SHIPPED | OUTPATIENT
Start: 2021-06-16 | End: 2022-04-01

## 2021-06-16 RX ORDER — ERGOCALCIFEROL 1.25 MG/1
50000 CAPSULE ORAL 2 TIMES WEEKLY
Qty: 24 CAPSULE | Refills: 1 | Status: SHIPPED | OUTPATIENT
Start: 2021-06-17 | End: 2022-05-06

## 2021-06-16 NOTE — PROGRESS NOTES
Subjective   Laura Cerda is a 61 y.o. female.     Vitals:    06/16/21 1527   BP: 132/88   Pulse: 106   Resp: 14   Temp: 98 °F (36.7 °C)   SpO2: 99%      Body mass index is 44.8 kg/m².     History of Present Illness   Patient was seen for hypertension.  Pressures been running 130s over 80s.  Patient will continue to monitor blood pressure at home.  Patient blood sugars been running in the 120s.  Patient's hemoglobin A1c was 6.2%.  Patient will continue present treatment and diet and exercise.  Patient will continue Januvia 100 mg daily.  Patient's thyroids been treated with unithyroid 75 mcg daily.  TSH was 3.1.  Patient is having labs redrawn today and will follow up in 4 days.  Patient had prior labs 2 weeks ago but thyroid levels were not drawn.  Patient had that drawn today.  Patient does have gout and is using allopurinol 100 mg daily.  Patient's uric acid is 6.0.  Patient is using colchicine 0.6 mg as needed.    Dictated utilizing Dragon dictation. If there are questions or for further clarification, please contact me.  The following portions of the patient's history were reviewed and updated as appropriate: allergies, current medications, past family history, past medical history, past social history, past surgical history and problem list.    Review of Systems   Constitutional: Negative for fatigue and fever.   HENT: Positive for congestion. Negative for trouble swallowing.    Eyes: Negative for discharge and visual disturbance.   Respiratory: Negative for choking and shortness of breath.    Cardiovascular: Negative for chest pain and palpitations.   Gastrointestinal: Negative for abdominal pain and blood in stool.   Endocrine: Negative.    Genitourinary: Negative for genital sores and hematuria.   Musculoskeletal: Negative for gait problem and joint swelling.   Skin: Negative for color change, pallor, rash and wound.   Allergic/Immunologic: Positive for environmental allergies. Negative for immunocompromised  state.   Neurological: Negative for facial asymmetry and speech difficulty.   Psychiatric/Behavioral: Negative for hallucinations and suicidal ideas.       Objective   Physical Exam  Vitals and nursing note reviewed.   Constitutional:       Appearance: Normal appearance. She is well-developed.   HENT:      Head: Normocephalic and atraumatic.      Nose: Nose normal.      Mouth/Throat:      Mouth: Mucous membranes are moist.      Pharynx: Oropharynx is clear.   Eyes:      Extraocular Movements: Extraocular movements intact.      Conjunctiva/sclera: Conjunctivae normal.      Pupils: Pupils are equal, round, and reactive to light.   Cardiovascular:      Rate and Rhythm: Normal rate and regular rhythm.      Heart sounds: Normal heart sounds. No murmur heard.   No friction rub. No gallop.    Pulmonary:      Effort: Pulmonary effort is normal. No respiratory distress.      Breath sounds: Normal breath sounds. No stridor. No wheezing, rhonchi or rales.   Chest:      Chest wall: No tenderness.   Abdominal:      General: Bowel sounds are normal.      Palpations: Abdomen is soft.   Musculoskeletal:         General: Normal range of motion.      Cervical back: Normal range of motion and neck supple.   Skin:     General: Skin is warm and dry.   Neurological:      General: No focal deficit present.      Mental Status: She is alert and oriented to person, place, and time. Mental status is at baseline.   Psychiatric:         Mood and Affect: Mood normal.         Behavior: Behavior normal.         Thought Content: Thought content normal.         Judgment: Judgment normal.         Assessment/Plan #1 continue to monitor blood pressure #2 continue to monitor blood sugar #3 continue present diet and activity levels #4 thyroid levels  Problems Addressed this Visit     Cardiac and Vasculature            Hypertension - Primary          Endocrine and Metabolic            Hypothyroid    Relevant Medications    Unithroid 75 MCG tablet    Other  Relevant Orders    Thyroid Panel With TSH    Controlled type 2 diabetes mellitus with complication, without long-term current use of insulin (CMS/[facility])    Relevant Medications    Januvia 100 MG tablet          Musculoskeletal and Injuries            Chronic gout of multiple sites            Diagnoses     Diagnosis Codes Comments    Essential hypertension    -  Primary ICD-10-CM: I10  ICD-9-CM: 401.9     Idiopathic chronic gout of multiple sites without tophus     ICD-10-CM: M1A.09X0  ICD-9-CM: 274.02     Controlled type 2 diabetes mellitus with complication, without long-term current use of insulin (CMS/HCC)     ICD-10-CM: E11.8  ICD-9-CM: 250.90     Hypothyroidism, unspecified type     ICD-10-CM: E03.9  ICD-9-CM: 244.9

## 2021-06-17 ENCOUNTER — LAB (OUTPATIENT)
Dept: FAMILY MEDICINE CLINIC | Facility: CLINIC | Age: 61
End: 2021-06-17

## 2021-06-17 LAB
T-UPTAKE NFR SERPL: 0.93 TBI (ref 0.8–1.3)
T4 SERPL-MCNC: 6.63 MCG/DL (ref 4.5–11.7)
TSH SERPL DL<=0.05 MIU/L-ACNC: 2.7 UIU/ML (ref 0.27–4.2)

## 2021-06-17 PROCEDURE — 84443 ASSAY THYROID STIM HORMONE: CPT | Performed by: INTERNAL MEDICINE

## 2021-06-17 PROCEDURE — 84479 ASSAY OF THYROID (T3 OR T4): CPT | Performed by: INTERNAL MEDICINE

## 2021-06-17 PROCEDURE — 84436 ASSAY OF TOTAL THYROXINE: CPT | Performed by: INTERNAL MEDICINE

## 2021-06-17 PROCEDURE — 36415 COLL VENOUS BLD VENIPUNCTURE: CPT | Performed by: INTERNAL MEDICINE

## 2021-06-22 ENCOUNTER — TELEPHONE (OUTPATIENT)
Dept: FAMILY MEDICINE CLINIC | Facility: CLINIC | Age: 61
End: 2021-06-22

## 2021-06-22 NOTE — TELEPHONE ENCOUNTER
Caller: CARINA     Relationship to patient: Other    Best call back number: [phone] TRACKING NUMBER [phone]    Patient is needing:      CARINA WITH [facility], CALLED ABOUT A PRIOR AUTHORIZATION ON ORDERS FOR THE PATIENT    LUMBAR SPINE MRI    IT IS IN A PEER TO PEER STATUS CURRENTLY AND THEY NEED PATIENT'S DOCTOR TO GIVE THEM A CALL TO SPEAK WITH A PHYSICIAN THERE AT [facility] REGARDING THIS PA.    PLEASE CALL AND ADVISE

## 2021-06-25 ENCOUNTER — TELEPHONE (OUTPATIENT)
Dept: FAMILY MEDICINE CLINIC | Facility: CLINIC | Age: 61
End: 2021-06-25

## 2021-06-25 NOTE — TELEPHONE ENCOUNTER
RAFAEL WITH [facility] CALLED TO REQUEST A LATER DATE OF THE MRI L SPINE SCHEDULED FOR TOMORROW. SHE IS WAITING ON A PEER TO PEER.    PLEASE CALL RAFAEL BACK [phone]

## 2021-06-26 ENCOUNTER — APPOINTMENT (OUTPATIENT)
Dept: MRI IMAGING | Facility: HOSPITAL | Age: 61
End: 2021-06-26

## 2021-06-28 NOTE — TELEPHONE ENCOUNTER
L/m on v/m stating records sent and dr. Maldonado did peer to peer but not sure what happened after records received may need to postpone

## 2021-06-30 ENCOUNTER — APPOINTMENT (OUTPATIENT)
Dept: MRI IMAGING | Facility: HOSPITAL | Age: 61
End: 2021-06-30

## 2021-07-22 ENCOUNTER — TELEPHONE (OUTPATIENT)
Dept: FAMILY MEDICINE CLINIC | Facility: CLINIC | Age: 61
End: 2021-07-22

## 2021-07-23 ENCOUNTER — TELEPHONE (OUTPATIENT)
Dept: FAMILY MEDICINE CLINIC | Facility: CLINIC | Age: 61
End: 2021-07-23

## 2021-07-23 NOTE — TELEPHONE ENCOUNTER
[facility] April 7,9,12,14,16,19,21 and 23  [facility] May 20,25, and June 1,8

## 2021-07-23 NOTE — TELEPHONE ENCOUNTER
Caller: SHANI    Relationship: [facility]    Best call back number: [phone]     What is the best time to reach you: 7AM-7PM CENTRAL     Who are you requesting to speak with (clinical staff, provider,  specific staff member):     Do you know the name of the person who called:     What was the call regarding: SHANI FROM [facility] IS CALLING IN REGARDING PATIENTS MRI OF LUMBAR SPINE.  HE SAYS THAT SOMEONE WILL BE AVAILABLE FOR PEER TO PEER IF DR GARRISON NEEDS TO SPEAK TO SOMEONE.  CASE TRACKING NUMBER      Do you require a callback: YES

## 2021-07-23 NOTE — TELEPHONE ENCOUNTER
Yes, pt stated she did physical therapy at [facility] close to home. Went 3 times a week on the following dates;  April 7, 9, 12, 14, 16, 19, 21, and 23. Then had to start going to [facility], unsure of those dates but there are notes from that office under media

## 2021-07-27 ENCOUNTER — HOSPITAL ENCOUNTER (OUTPATIENT)
Dept: MRI IMAGING | Facility: HOSPITAL | Age: 61
Discharge: HOME | End: 2021-07-27
Admitting: INTERNAL MEDICINE

## 2021-07-27 DIAGNOSIS — M48.00 SPINAL STENOSIS, UNSPECIFIED SPINAL REGION: ICD-10-CM

## 2021-07-27 PROBLEM — K57.90 DIVERTICULOSIS: Status: ACTIVE | Noted: 2021-07-27

## 2021-07-27 PROCEDURE — 72148 MRI LUMBAR SPINE W/O DYE: CPT

## 2021-07-28 ENCOUNTER — TELEPHONE (OUTPATIENT)
Dept: FAMILY MEDICINE CLINIC | Facility: CLINIC | Age: 61
End: 2021-07-28

## 2021-07-28 NOTE — TELEPHONE ENCOUNTER
Caller: Laura Cerda    Relationship: Self    Best call back number: [phone]    What is the best time to reach you: ANYTIME    Who are you requesting to speak with (clinical staff, provider,  specific staff member): PROVIDER    What was the call regarding: THE PATIENT RECEIVED HER MRI RESULTS VIA [employer]. SHE WOULD LIKE TO KNOW WHAT HER NEXT STEPS WILL BE IN REGARD TO TREATMENT.    Do you require a callback: YES, PLEASE

## 2021-08-09 RX ORDER — ATORVASTATIN CALCIUM 20 MG/1
TABLET, FILM COATED ORAL
Qty: 30 TABLET | Refills: 1 | Status: SHIPPED | OUTPATIENT
Start: 2021-08-09 | End: 2021-10-15

## 2021-09-07 ENCOUNTER — OFFICE VISIT (OUTPATIENT)
Dept: FAMILY MEDICINE CLINIC | Facility: CLINIC | Age: 61
End: 2021-09-07

## 2021-09-07 VITALS
SYSTOLIC BLOOD PRESSURE: 130 MMHG | TEMPERATURE: 97.3 F | DIASTOLIC BLOOD PRESSURE: 76 MMHG | RESPIRATION RATE: 20 BRPM | HEART RATE: 100 BPM | WEIGHT: 240 LBS | OXYGEN SATURATION: 97 % | BODY MASS INDEX: 44.16 KG/M2 | HEIGHT: 62 IN
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
## 2021-09-07 DIAGNOSIS — S30.861A INSECT BITE OF ABDOMINAL WALL, INITIAL ENCOUNTER: Primary | ICD-10-CM

## 2021-09-07 PROCEDURE — 99213 OFFICE O/P EST LOW 20 MIN: CPT | Performed by: NURSE PRACTITIONER

## 2021-09-07 RX ORDER — DOXYCYCLINE HYCLATE 100 MG
100 TABLET ORAL 2 TIMES DAILY
Qty: 20 TABLET | Refills: 0 | Status: SHIPPED | OUTPATIENT
Start: 2021-09-07 | End: 2021-09-30

## 2021-09-07 NOTE — PATIENT INSTRUCTIONS
Start doxycycline 100mg bid for 10 days.   bactroban oint to affected area bid,   Keep area clean and dry,  Insect repellent and long clothing when outdoors.   If symptoms persist 3-5 days call office.   Patient agrees with plan of care and understands instructions. Call if worsening symptoms or any problems or concerns.

## 2021-09-07 NOTE — PROGRESS NOTES
"Chief Complaint  Insect Bite and Back Pain (went to ortho following MRI had epidural withh steroid)    Subjective          Laura Cerda presents to [facility]  History of Present Illness  C/o insect bite. Noticed about 5 days ago, she was outside on porch, does have pain in area, did notice red streaks, tried baking soda to help with pain, she noticed warmth, did notice drainage and bleeding, denies tick bite, she denies fever. She did have nausea.       Objective   Vital Signs:   /76 (BP Location: Left arm, Patient Position: Sitting)   Pulse 100   Temp 97.3 °F (36.3 °C) (Infrared)   Resp 20   Ht 157.5 cm (62\")   Wt 109 kg (240 lb)   SpO2 97%   BMI 43.90 kg/m²     Physical Exam  Vitals and nursing note reviewed.   Constitutional:       Appearance: She is well-developed.   HENT:      Head: Normocephalic.   Eyes:      Pupils: Pupils are equal, round, and reactive to light.   Cardiovascular:      Rate and Rhythm: Normal rate and regular rhythm.      Heart sounds: Normal heart sounds.   Pulmonary:      Effort: Pulmonary effort is normal.      Breath sounds: Normal breath sounds.   Skin:     General: Skin is warm and dry.      Findings: Lesion present.          Neurological:      Mental Status: She is alert and oriented to person, place, and time.   Psychiatric:         Behavior: Behavior normal.         Judgment: Judgment normal.        Result Review :                 Assessment and Plan    Diagnoses and all orders for this visit:    1. Insect bite of abdominal wall, initial encounter (Primary)    Other orders  -     doxycycline (VIBRAMYICN) 100 MG tablet; Take 1 tablet by mouth 2 (Two) Times a Day.  Dispense: 20 tablet; Refill: 0  -     mupirocin (Bactroban) 2 % ointment; Apply  topically to the appropriate area as directed 3 (Three) Times a Day.  Dispense: 22 g; Refill: 0        Follow Up   No follow-ups on file.  Patient was given instructions and counseling regarding her " condition or for health maintenance advice. Please see specific information pulled into the AVS if appropriate.   Start doxycycline 100mg bid for 10 days.   bactroban oint to affected area bid,   Keep area clean and dry,  Insect repellent and long clothing when outdoors.   If symptoms persist 3-5 days call office.   Patient agrees with plan of care and understands instructions. Call if worsening symptoms or any problems or concerns.

## 2021-09-30 ENCOUNTER — OFFICE VISIT (OUTPATIENT)
Dept: OBSTETRICS AND GYNECOLOGY | Age: 61
End: 2021-09-30

## 2021-09-30 VITALS
SYSTOLIC BLOOD PRESSURE: 134 MMHG | HEIGHT: 62 IN | BODY MASS INDEX: 44.72 KG/M2 | DIASTOLIC BLOOD PRESSURE: 88 MMHG | WEIGHT: 243 LBS

## 2021-09-30 DIAGNOSIS — Z13.89 SCREENING FOR BLOOD OR PROTEIN IN URINE: ICD-10-CM

## 2021-09-30 DIAGNOSIS — R35.0 URINARY FREQUENCY: ICD-10-CM

## 2021-09-30 DIAGNOSIS — N95.0 POST-MENOPAUSAL BLEEDING: Primary | ICD-10-CM

## 2021-09-30 LAB
BILIRUB BLD-MCNC: NEGATIVE MG/DL
CLARITY, POC: CLEAR
COLOR UR: YELLOW
GLUCOSE UR STRIP-MCNC: NEGATIVE MG/DL
KETONES UR QL: NEGATIVE
LEUKOCYTE EST, POC: NEGATIVE
NITRITE UR-MCNC: NEGATIVE MG/ML
PH UR: 6 [PH] (ref 5–8)
PROT UR STRIP-MCNC: NEGATIVE MG/DL
RBC # UR STRIP: NEGATIVE /UL
SP GR UR: 1.02 (ref 1–1.03)
UROBILINOGEN UR QL: NORMAL

## 2021-09-30 PROCEDURE — 99213 OFFICE O/P EST LOW 20 MIN: CPT | Performed by: PHYSICIAN ASSISTANT

## 2021-09-30 PROCEDURE — 81002 URINALYSIS NONAUTO W/O SCOPE: CPT | Performed by: PHYSICIAN ASSISTANT

## 2021-09-30 NOTE — PROGRESS NOTES
"Subjective     Chief Complaint   Patient presents with   • Gynecologic Exam     c/o spotting around 2021 (stopped now), back pain       Laura Cerda is a 61 y.o.  whose LMP is No LMP recorded. Patient is postmenopausal. presents with an episode of PMB and cramping  Noted some left ovarian pain 2 wks ago  Also noted some light menstrual cramps and pain in back  Stopped with both on   Prone to back issues but feels like it is in a different area  Does think she has some bladder changes    She is under a lot of stress  Has custody of her 3 and 8 yo grandchildren  Will have them for 9 months at least    No Additional Complaints Reported    The following portions of the patient's history were reviewed and updated as appropriate:vital signs, allergies, current medications, past family history, past medical history, past social history, past surgical history and problem list      Review of Systems   Genitourinary:positive for post menopausal bleeding     Objective      /88   Ht 157.5 cm (62\")   Wt 110 kg (243 lb)   Breastfeeding No   BMI 44.45 kg/m²     Physical Exam    General:   alert, comfortable and no distress   Heart: Not performed today   Lungs: Not performed today.   Breast: Not performed today   Neck: na   Abdomen: {Not performed today   CVA: Not performed today   Pelvis: External genitalia: normal general appearance  Vaginal: normal mucosa without prolapse or lesions  Cervix: normal appearance   Extremities: Not performed today   Neurologic: negative   Psychiatric: Normal affect, judgement, and mood       Endometrial Biopsy    Date of procedure:  2021    Procedure documentation:    The cervix was grasped anterior at the 12 o'clock position.  The cavity sounded to 4 centimeters.  An endometrial biopsy specimen was unable to be obtained. Only able to pass pipelle to 4 cm, even after dilation was attempted.   Tenaculum was removed from the cervix with scant bleeding.    Pt tolerated " procedure well    Post procedure instructions: She was instructed to call us in 1 week's time if she has not heard from us otherwise.  If there is any significant fever or excessive bleeding or pain she is to call immediately.                  Lab Review   Labs: No data reviewed     Imaging   Ultrasound - Pelvic Vaginal    1.  Uterus:  Normal size and Anteverted    2.  Endometrium:   Normal non-menopausal thickness and 1.2 ant, 1.3 posterior mm , fluid noted in endometrium    3.  Myometrium:  Normal homogenous texture    4. Left ovary:     Normal/unremarkable       Right ovary:   Not visualized     Assessment/Plan     ASSESSMENT  1. Post-menopausal bleeding    2. Screening for blood or protein in urine    3. Urinary frequency        PLAN  1.   Orders Placed This Encounter   Procedures   • Urine Culture - , Urine, Random Void   • POC Urinalysis Dipstick       2. Await urine culture. Back pain could be musculoskeletal given the fact that she is now caring for a 3 yo. Would suggest f/u with PCP    3. Biopsy unable to be done. Will plan rpt u/s in 4 wks with Dr Orantes and can discuss poc based off those findings.       Follow up: 4 week(s)    HENOK Farrar  9/30/2021

## 2021-10-02 LAB
BACTERIA UR CULT: NO GROWTH
BACTERIA UR CULT: NORMAL

## 2021-10-15 RX ORDER — ATORVASTATIN CALCIUM 20 MG/1
TABLET, FILM COATED ORAL
Qty: 90 TABLET | Refills: 1 | Status: SHIPPED | OUTPATIENT
Start: 2021-10-15 | End: 2022-08-10 | Stop reason: SDUPTHER

## 2021-10-27 ENCOUNTER — OFFICE VISIT (OUTPATIENT)
Dept: OBSTETRICS AND GYNECOLOGY | Age: 61
End: 2021-10-27

## 2021-10-27 VITALS
DIASTOLIC BLOOD PRESSURE: 82 MMHG | SYSTOLIC BLOOD PRESSURE: 138 MMHG | BODY MASS INDEX: 44.24 KG/M2 | HEIGHT: 62 IN | WEIGHT: 240.4 LBS

## 2021-10-27 DIAGNOSIS — N95.0 PMB (POSTMENOPAUSAL BLEEDING): Primary | ICD-10-CM

## 2021-10-27 PROCEDURE — 99213 OFFICE O/P EST LOW 20 MIN: CPT | Performed by: STUDENT IN AN ORGANIZED HEALTH CARE EDUCATION/TRAINING PROGRAM

## 2021-10-27 RX ORDER — NABUMETONE 750 MG/1
TABLET, FILM COATED ORAL
COMMUNITY
Start: 2021-10-07 | End: 2022-02-15

## 2021-10-27 RX ORDER — IXEKIZUMAB 80 MG/ML
INJECTION, SOLUTION SUBCUTANEOUS
COMMUNITY
Start: 2021-10-15 | End: 2022-08-12

## 2021-10-27 RX ORDER — FUROSEMIDE 20 MG/1
TABLET ORAL
COMMUNITY
Start: 2021-10-07 | End: 2021-12-28 | Stop reason: SDUPTHER

## 2021-10-27 NOTE — PROGRESS NOTES
[facility]   Obstetrics and Gynecology     10/27/2021      Patient:  Laura Cerda   MR#:[id number]    Office note    Chief Complaint   Patient presents with   • Follow-up     Gyn repeat US today, EMB attempted 2021,  Denies bleeding/cramping currently       Subjective     History of Present Illness  61 y.o. female  presents for f/u of PMB.  Last seen 2021 by Joanne Monteiro for this issue.  Reported 1 week of cramping and vaginal bleeding at that time.  This is the only times is happened since menopause.  Ultrasound at that time showed 4 mm stripe but with some fluid in the cavity.  Joanne attempted endometrial biopsy but was unable to advance Pipelle passed 4 cm even with dilation, likely because uterus appears quite anteflexed on ultrasound.  Patient states bleeding then resolved after this appointment.  Repeat ultrasound today shows no fluid in cavity and 2 mm stripe.      Relevant data reviewed:  US Non-ob Transvaginal (2021 15:04) - EMS 4.1mm but with some fluid in cavity so difficult to accurately assess, uterus anteflexed and 6cm    Patient has taken over custody of 3 and 9-year-old grandchildren.  Her stepdaughter entered inpatient rehab yesterday and will be there through January.  Patient is working hard to catch up children in school and support their emotional wellbeing.  Her  is very helpful.    Patient Active Problem List   Diagnosis   • Fibromyalgia   • Hypothyroid   • Hyperlipidemia   • Meniere's disease   • Morbid obesity due to excess calories (HCC)   • Psoriatic arthritis (HCC)   • GERD (gastroesophageal reflux disease)   • Hypertension   • Elevated PTHrP level   • Vitamin D deficiency   • Hyperglycemia   • Chronic fatigue   • Primary hyperparathyroidism (HCC)   • Constipation   • Dysphagia   • Diarrhea   • Polyp of colon   • Ulcer of esophagus without bleeding   • Prediabetes   • Controlled type 2 diabetes mellitus with complication, without long-term  current use of insulin (HCC)   • Hyperuricemia   • Low back pain   • Chronic gout of multiple sites   • Diverticulosis       Past Medical History:   Diagnosis Date   • Arthritis     psoriatic   • Chest pain    • Chronic kidney disease    • Elevated PTHrP level    • Fatigue    • Fibromyalgia    • Fibromyalgia, primary    • Foot swelling    • GERD (gastroesophageal reflux disease)    • GERD (gastroesophageal reflux disease)    • History of Holter monitoring 2016   • Hyperlipidemia    • Hyperparathyroidism (HCC)    • Hypertension    • Hypothyroidism    • Joint pain     worsening   • Meniere's disease    • Nausea    • Osteoarthritis    • Palpitations    • PONV (postoperative nausea and vomiting)    • Postmenopausal    • Psoriatic arthritis (HCC)    • Rapid heart rate    • Raynaud disease    • Thyroid disease    • Vitamin D deficiency      Past Surgical History:   Procedure Laterality Date   • BREAST BIOPSY Right 2013    benign   • CATARACT EXTRACTION  17;17   •  SECTION     •  SECTION     • CHOLECYSTECTOMY     • COLONOSCOPY N/A 2018    Procedure: COLONOSCOPY INTO CECUM AND TERMINAL ILEUM WTIH COLD BIOPSIES;  Surgeon: Reji Rodríguez MD;  Location: [facility] ENDOSCOPY;  Service: Gastroenterology   • ENDOSCOPY N/A 2018    Procedure: ESOPHAGOGASTRODUODENOSCOPY WITH BIOPSIES;  Surgeon: Reji Rodríguez MD;  Location: [facility] ENDOSCOPY;  Service: Gastroenterology   • ENDOSCOPY N/A 2018    Procedure: ESOPHAGOGASTRODUODENOSCOPY;  Surgeon: Reji Rodríguez MD;  Location: [facility] ENDOSCOPY;  Service: Gastroenterology   • HEMORRHOIDECTOMY     • REPLACEMENT TOTAL KNEE Right 2020   • TUBAL ABDOMINAL LIGATION     • WISDOM TOOTH EXTRACTION       Obstetric History:  OB History        2    Para   2    Term   2            AB        Living   2       SAB        IAB        Ectopic        Molar        Multiple        Live Births   2                Menstrual History:     No LMP recorded. Patient is postmenopausal.       # 1 - Date: , Sex: Female, Weight: 3629 g (8 lb), GA: 40w0d, Delivery: , Low Transverse, Apgar1: None, Apgar5: None, Living: Living, Birth Comments: None    # 2 - Date: , Sex: Male, Weight: 3175 g (7 lb), GA: 40w0d, Delivery: , Low Transverse, Apgar1: None, Apgar5: None, Living: Living, Birth Comments: None    Family History   Problem Relation Age of Onset   • Cancer Mother         Breast   • Heart failure Mother    • Hypertension Mother    • Diabetes Mother    • Inflammatory bowel disease Mother    • Diverticulitis Mother    • Cancer Father         Colon   • Diabetes Father    • Arthritis Brother    • Heart disease Maternal Grandmother    • Stroke Maternal Grandmother    • Inflammatory bowel disease Maternal Grandmother    • Diverticulitis Maternal Grandmother    • Heart disease Maternal Grandfather    • Colon cancer Paternal Grandmother      Social History     Tobacco Use   • Smoking status: Never Smoker   • Smokeless tobacco: Never Used   Vaping Use   • Vaping Use: Never used   Substance Use Topics   • Alcohol use: No   • Drug use: No     Augmentin [amoxicillin-pot clavulanate], Azithromycin, Ceclor [cefaclor], Lyrica [pregabalin], and Metformin    Current Outpatient Medications:   •  acetaminophen (TYLENOL) 500 MG tablet, Take 500 mg by mouth as needed for mild pain (1-3)., Disp: , Rfl:   •  allopurinol (Zyloprim) 100 MG tablet, Take 1 tablet by mouth Daily., Disp: 90 tablet, Rfl: 1  •  atorvastatin (LIPITOR) 20 MG tablet, TAKE ONE TABLET BY MOUTH ONCE NIGHTLY, Disp: 90 tablet, Rfl: 1  •  fexofenadine (ALLEGRA) 180 MG tablet, TAKE ONE TABLET BY MOUTH DAILY (Patient taking differently: Take 180 mg by mouth Daily As Needed.), Disp: 90 tablet, Rfl: 2  •  furosemide (LASIX) 20 MG tablet, , Disp: , Rfl:   •  Januvia 100 MG tablet, Take 1 tablet by mouth Daily., Disp: 90 tablet, Rfl: 1  •  milnacipran (SAVELLA) 50 MG  "tablet tablet, Take  by mouth 2 (two) times a day., Disp: , Rfl:   •  mometasone (NASONEX) 50 MCG/ACT nasal spray, 2 sprays into each nostril Daily., Disp: , Rfl:   •  Multiple Vitamins-Minerals (MULTIVITAMIN ADULT PO), Take  by mouth daily., Disp: , Rfl:   •  nabumetone (RELAFEN) 750 MG tablet, , Disp: , Rfl:   •  pantoprazole (PROTONIX) 40 MG EC tablet, Take 1 tablet by mouth Daily., Disp: 30 tablet, Rfl: 11  •  Taltz 80 MG/ML solution auto-injector, , Disp: , Rfl:   •  tiZANidine (ZANAFLEX) 2 MG tablet, Take 2 mg by mouth At Night As Needed for Muscle Spasms., Disp: , Rfl:   •  Unithroid 75 MCG tablet, Take 1 tablet by mouth Daily., Disp: 90 tablet, Rfl: 1  •  vitamin D (ERGOCALCIFEROL) 1.25 MG (62011 UT) capsule capsule, Take 1 capsule by mouth 2 (Two) Times a Week., Disp: 24 capsule, Rfl: 1  •  sucralfate (Carafate) 1 GM/10ML suspension, Take 10 mL by mouth 4 (Four) Times a Day., Disp: 420 mL, Rfl: 3    The following portions of the patient's history were reviewed and updated as appropriate: allergies, current medications, past family history, past medical history, past social history, past surgical history and problem list.    Review of Systems   All other systems reviewed and are negative.      BP Readings from Last 3 Encounters:   10/27/21 138/82   09/30/21 134/88   09/07/21 130/76      Wt Readings from Last 3 Encounters:   10/27/21 109 kg (240 lb 6.4 oz)   09/30/21 110 kg (243 lb)   09/07/21 109 kg (240 lb)      BMI: Estimated body mass index is 43.97 kg/m² as calculated from the following:    Height as of this encounter: 157.5 cm (62\").    Weight as of this encounter: 109 kg (240 lb 6.4 oz). BSA: Estimated body surface area is 2.07 meters squared as calculated from the following:    Height as of this encounter: 157.5 cm (62\").    Weight as of this encounter: 109 kg (240 lb 6.4 oz).    Objective   Physical Exam  Vitals and nursing note reviewed.   Constitutional:       General: She is not in acute " distress.     Appearance: Normal appearance.   Pulmonary:      Effort: Pulmonary effort is normal.   Neurological:      Mental Status: She is alert.         Assessment/Plan     Diagnoses and all orders for this visit:    1. PMB (postmenopausal bleeding) (Primary)    -Discussed that ultrasound today showed 2 mm stripe with no more fluid in the cavity.  Attempted endometrial biopsy with dilation in September likely open canal enough to release fluid.  Since patient's symptoms have resolved and endometrial stripe is thin, no need for further intervention at this time.  We discussed though that if bleeding returned, I would recommend hysteroscopy D&C for thorough evaluation and endometrial sampling.  Patient amenable.    Dottie Orantes MD   10/27/2021 13:54 EDT

## 2021-12-08 DIAGNOSIS — E03.9 HYPOTHYROIDISM, UNSPECIFIED TYPE: ICD-10-CM

## 2021-12-08 DIAGNOSIS — E11.8 CONTROLLED TYPE 2 DIABETES MELLITUS WITH COMPLICATION, WITHOUT LONG-TERM CURRENT USE OF INSULIN (HCC): ICD-10-CM

## 2021-12-08 DIAGNOSIS — M10.00 IDIOPATHIC GOUT, UNSPECIFIED CHRONICITY, UNSPECIFIED SITE: ICD-10-CM

## 2021-12-08 DIAGNOSIS — M1A.09X0 IDIOPATHIC CHRONIC GOUT OF MULTIPLE SITES WITHOUT TOPHUS: Primary | ICD-10-CM

## 2021-12-09 ENCOUNTER — LAB (OUTPATIENT)
Dept: FAMILY MEDICINE CLINIC | Facility: CLINIC | Age: 61
End: 2021-12-09

## 2021-12-09 DIAGNOSIS — M10.00 IDIOPATHIC GOUT, UNSPECIFIED CHRONICITY, UNSPECIFIED SITE: ICD-10-CM

## 2021-12-09 DIAGNOSIS — E11.8 CONTROLLED TYPE 2 DIABETES MELLITUS WITH COMPLICATION, WITHOUT LONG-TERM CURRENT USE OF INSULIN (HCC): ICD-10-CM

## 2021-12-09 DIAGNOSIS — E03.9 HYPOTHYROIDISM, UNSPECIFIED TYPE: ICD-10-CM

## 2021-12-09 LAB
25(OH)D3 SERPL-MCNC: 42.6 NG/ML (ref 30–100)
ALBUMIN SERPL-MCNC: 4.2 G/DL (ref 3.5–5.2)
ALBUMIN/GLOB SERPL: 1.2 G/DL
ALP SERPL-CCNC: 144 U/L (ref 39–117)
ALT SERPL W P-5'-P-CCNC: 24 U/L (ref 1–33)
ANION GAP SERPL CALCULATED.3IONS-SCNC: 10.8 MMOL/L (ref 5–15)
AST SERPL-CCNC: 24 U/L (ref 1–32)
BILIRUB SERPL-MCNC: 0.4 MG/DL (ref 0–1.2)
BUN SERPL-MCNC: 13 MG/DL (ref 8–23)
BUN/CREAT SERPL: 17.8 (ref 7–25)
CALCIUM SPEC-SCNC: 10.5 MG/DL (ref 8.6–10.5)
CHLORIDE SERPL-SCNC: 101 MMOL/L (ref 98–107)
CHOLEST SERPL-MCNC: 184 MG/DL (ref 0–200)
CO2 SERPL-SCNC: 30.2 MMOL/L (ref 22–29)
CREAT SERPL-MCNC: 0.73 MG/DL (ref 0.57–1)
DEPRECATED RDW RBC AUTO: 46.4 FL (ref 37–54)
ERYTHROCYTE [DISTWIDTH] IN BLOOD BY AUTOMATED COUNT: 14.5 % (ref 12.3–15.4)
GFR SERPL CREATININE-BSD FRML MDRD: 81 ML/MIN/1.73
GLOBULIN UR ELPH-MCNC: 3.4 GM/DL
GLUCOSE SERPL-MCNC: 128 MG/DL (ref 65–99)
HBA1C MFR BLD: 6.46 % (ref 4.8–5.6)
HCT VFR BLD AUTO: 41.9 % (ref 34–46.6)
HDLC SERPL-MCNC: 61 MG/DL (ref 40–60)
HGB BLD-MCNC: 13.6 G/DL (ref 12–15.9)
LDLC SERPL CALC-MCNC: 100 MG/DL (ref 0–100)
LDLC/HDLC SERPL: 1.59 {RATIO}
MCH RBC QN AUTO: 28.3 PG (ref 26.6–33)
MCHC RBC AUTO-ENTMCNC: 32.5 G/DL (ref 31.5–35.7)
MCV RBC AUTO: 87.3 FL (ref 79–97)
PLATELET # BLD AUTO: 211 10*3/MM3 (ref 140–450)
PMV BLD AUTO: 13.3 FL (ref 6–12)
POTASSIUM SERPL-SCNC: 4.4 MMOL/L (ref 3.5–5.2)
PROT SERPL-MCNC: 7.6 G/DL (ref 6–8.5)
RBC # BLD AUTO: 4.8 10*6/MM3 (ref 3.77–5.28)
SODIUM SERPL-SCNC: 142 MMOL/L (ref 136–145)
T-UPTAKE NFR SERPL: 0.95 TBI (ref 0.8–1.3)
T4 SERPL-MCNC: 9.22 MCG/DL (ref 4.5–11.7)
TRIGL SERPL-MCNC: 131 MG/DL (ref 0–150)
TSH SERPL DL<=0.05 MIU/L-ACNC: 2.89 UIU/ML (ref 0.27–4.2)
URATE SERPL-MCNC: 7.3 MG/DL (ref 2.4–5.7)
VLDLC SERPL-MCNC: 23 MG/DL (ref 5–40)
WBC NRBC COR # BLD: 10.69 10*3/MM3 (ref 3.4–10.8)

## 2021-12-09 PROCEDURE — 83036 HEMOGLOBIN GLYCOSYLATED A1C: CPT | Performed by: INTERNAL MEDICINE

## 2021-12-09 PROCEDURE — 36415 COLL VENOUS BLD VENIPUNCTURE: CPT | Performed by: INTERNAL MEDICINE

## 2021-12-09 PROCEDURE — 84443 ASSAY THYROID STIM HORMONE: CPT | Performed by: INTERNAL MEDICINE

## 2021-12-09 PROCEDURE — 84479 ASSAY OF THYROID (T3 OR T4): CPT | Performed by: INTERNAL MEDICINE

## 2021-12-09 PROCEDURE — 85027 COMPLETE CBC AUTOMATED: CPT | Performed by: INTERNAL MEDICINE

## 2021-12-09 PROCEDURE — 84550 ASSAY OF BLOOD/URIC ACID: CPT | Performed by: INTERNAL MEDICINE

## 2021-12-09 PROCEDURE — 84436 ASSAY OF TOTAL THYROXINE: CPT | Performed by: INTERNAL MEDICINE

## 2021-12-09 PROCEDURE — 80053 COMPREHEN METABOLIC PANEL: CPT | Performed by: INTERNAL MEDICINE

## 2021-12-09 PROCEDURE — 80061 LIPID PANEL: CPT | Performed by: INTERNAL MEDICINE

## 2021-12-09 PROCEDURE — 82306 VITAMIN D 25 HYDROXY: CPT | Performed by: INTERNAL MEDICINE

## 2021-12-28 ENCOUNTER — OFFICE VISIT (OUTPATIENT)
Dept: FAMILY MEDICINE CLINIC | Facility: CLINIC | Age: 61
End: 2021-12-28

## 2021-12-28 VITALS
DIASTOLIC BLOOD PRESSURE: 78 MMHG | BODY MASS INDEX: 44.9 KG/M2 | HEIGHT: 62 IN | TEMPERATURE: 98 F | HEART RATE: 88 BPM | SYSTOLIC BLOOD PRESSURE: 140 MMHG | OXYGEN SATURATION: 100 % | WEIGHT: 244 LBS

## 2021-12-28 DIAGNOSIS — E11.8 CONTROLLED TYPE 2 DIABETES MELLITUS WITH COMPLICATION, WITHOUT LONG-TERM CURRENT USE OF INSULIN (HCC): ICD-10-CM

## 2021-12-28 DIAGNOSIS — M1A.09X0 IDIOPATHIC CHRONIC GOUT OF MULTIPLE SITES WITHOUT TOPHUS: ICD-10-CM

## 2021-12-28 DIAGNOSIS — F41.9 ANXIETY: Primary | ICD-10-CM

## 2021-12-28 DIAGNOSIS — I10 PRIMARY HYPERTENSION: ICD-10-CM

## 2021-12-28 PROCEDURE — 99214 OFFICE O/P EST MOD 30 MIN: CPT | Performed by: INTERNAL MEDICINE

## 2021-12-28 RX ORDER — POTASSIUM CHLORIDE 750 MG/1
TABLET, FILM COATED, EXTENDED RELEASE ORAL
Qty: 30 TABLET | Refills: 3 | Status: SHIPPED | OUTPATIENT
Start: 2021-12-28 | End: 2022-07-18

## 2021-12-28 RX ORDER — FUROSEMIDE 20 MG/1
20 TABLET ORAL DAILY
Qty: 30 TABLET | Refills: 3 | Status: SHIPPED | OUTPATIENT
Start: 2021-12-28 | End: 2022-05-06

## 2021-12-28 RX ORDER — HYDROXYZINE HYDROCHLORIDE 25 MG/1
25 TABLET, FILM COATED ORAL EVERY 8 HOURS PRN
Qty: 30 TABLET | Refills: 0 | Status: SHIPPED | OUTPATIENT
Start: 2021-12-28 | End: 2022-02-22

## 2021-12-28 NOTE — PROGRESS NOTES
Subjective   Laura Cerda is a 61 y.o. female.     Vitals:    12/28/21 1133   BP: 140/78   Pulse: 88   Temp: 98 °F (36.7 °C)   SpO2: 100%      Body mass index is 44.63 kg/m².     History of Present Illness   Patient was seen for anxiety.  Patient is having to raise her grandchildren.  Patient was placed on Zoloft 50 mg daily.  Patient was also given hydroxyzine 25 mg a 8 hours as needed anxiety.  Patient was informed of Atarax was sedating.  Patient's blood pressure has been running one forties over nineties.  Patient will continue monitoring blood pressure and follow-up in 1 month.  Patient does have gout and is taking allopurinol 100 mg daily.  Patient's uric acid was 7.0.  Patient states she was out of her medicine for over a week.  Patient does take Lasix for swelling and was informed to do it only as needed.  Patient was informed it could be raising her uric acid.  Patient hemoglobin A1c was 6 .4 percent.  Patient will continue present treatment.  Patient will continue present diet and activity levels.    Dictated utilizing Dragon dictation. If there are questions or for further clarification, please contact me.  The following portions of the patient's history were reviewed and updated as appropriate: allergies, current medications, past family history, past medical history, past social history, past surgical history and problem list.    Review of Systems   Constitutional: Negative for fatigue and fever.   HENT: Positive for congestion. Negative for trouble swallowing.    Eyes: Negative for discharge and visual disturbance.   Respiratory: Negative for choking and shortness of breath.    Cardiovascular: Negative for chest pain and palpitations.   Gastrointestinal: Negative for abdominal pain and blood in stool.   Endocrine: Negative.    Genitourinary: Negative for genital sores and hematuria.   Musculoskeletal: Negative for gait problem and joint swelling.   Skin: Negative for color change, pallor, rash and wound.    Allergic/Immunologic: Positive for environmental allergies. Negative for immunocompromised state.   Neurological: Negative for facial asymmetry and speech difficulty.   Psychiatric/Behavioral: Negative for hallucinations and suicidal ideas. The patient is nervous/anxious.        Objective   Physical Exam  Vitals and nursing note reviewed.   Constitutional:       Appearance: Normal appearance. She is well-developed.   HENT:      Head: Normocephalic and atraumatic.      Nose: Nose normal.      Mouth/Throat:      Mouth: Mucous membranes are moist.      Pharynx: Oropharynx is clear.   Eyes:      Extraocular Movements: Extraocular movements intact.      Conjunctiva/sclera: Conjunctivae normal.      Pupils: Pupils are equal, round, and reactive to light.   Cardiovascular:      Rate and Rhythm: Normal rate and regular rhythm.      Heart sounds: Normal heart sounds. No murmur heard.  No friction rub. No gallop.    Pulmonary:      Effort: Pulmonary effort is normal. No respiratory distress.      Breath sounds: Normal breath sounds. No stridor. No wheezing, rhonchi or rales.   Chest:      Chest wall: No tenderness.   Abdominal:      General: Bowel sounds are normal.      Palpations: Abdomen is soft.   Musculoskeletal:         General: Normal range of motion.      Cervical back: Normal range of motion and neck supple.   Skin:     General: Skin is warm and dry.   Neurological:      General: No focal deficit present.      Mental Status: She is alert and oriented to person, place, and time. Mental status is at baseline.   Psychiatric:         Behavior: Behavior normal.         Thought Content: Thought content normal.         Judgment: Judgment normal.      Comments: Patient has moderate anxiety.         Assessment/Plan #1 Zoloft 50 mg daily #2 hydroxyzine 25 mg every 8 hours as needed anxiety #3 continue monitoring blood pressure and blood sugar #4 follow-up in 1 month  Problems Addressed this Visit        Cardiac and  Vasculature    Hypertension    Relevant Medications    furosemide (LASIX) 20 MG tablet       Endocrine and Metabolic    Controlled type 2 diabetes mellitus with complication, without long-term current use of insulin (HCC)       Musculoskeletal and Injuries    Chronic gout of multiple sites      Other Visit Diagnoses     Anxiety    -  Primary      Diagnoses     Diagnosis Codes Comments    Anxiety    -  Primary ICD-10-CM: F41.9  ICD-9-CM: 300.00     Idiopathic chronic gout of multiple sites without tophus     ICD-10-CM: M1A.09X0  ICD-9-CM: 274.02     Primary hypertension     ICD-10-CM: I10  ICD-9-CM: 401.9     Controlled type 2 diabetes mellitus with complication, without long-term current use of insulin (HCC)     ICD-10-CM: E11.8  ICD-9-CM: 250.90

## 2022-01-28 RX ORDER — ALOGLIPTIN 25 MG/1
25 TABLET, FILM COATED ORAL DAILY
Qty: 90 TABLET | Refills: 1 | Status: SHIPPED | OUTPATIENT
Start: 2022-01-28 | End: 2022-02-08 | Stop reason: ALTCHOICE

## 2022-02-01 RX ORDER — PANTOPRAZOLE SODIUM 40 MG/1
TABLET, DELAYED RELEASE ORAL
Qty: 30 TABLET | Refills: 11 | OUTPATIENT
Start: 2022-02-01

## 2022-02-03 RX ORDER — PANTOPRAZOLE SODIUM 40 MG/1
TABLET, DELAYED RELEASE ORAL
Qty: 30 TABLET | Refills: 11 | OUTPATIENT
Start: 2022-02-03

## 2022-02-08 RX ORDER — PIOGLITAZONEHYDROCHLORIDE 15 MG/1
15 TABLET ORAL DAILY
Qty: 30 TABLET | Refills: 3 | Status: SHIPPED | OUTPATIENT
Start: 2022-02-08 | End: 2022-06-06

## 2022-02-10 ENCOUNTER — TELEPHONE (OUTPATIENT)
Dept: GASTROENTEROLOGY | Facility: CLINIC | Age: 62
End: 2022-02-10

## 2022-02-10 RX ORDER — PANTOPRAZOLE SODIUM 40 MG/1
40 TABLET, DELAYED RELEASE ORAL DAILY
Qty: 30 TABLET | Refills: 2 | Status: SHIPPED | OUTPATIENT
Start: 2022-02-10 | End: 2022-02-15 | Stop reason: SDUPTHER

## 2022-02-10 NOTE — TELEPHONE ENCOUNTER
Called pt and advised pt that we have refilled her pantoprazole to her [facility] pharmacy.  Verb understanding.

## 2022-02-10 NOTE — TELEPHONE ENCOUNTER
----- Message from Reid Mcdonald sent at 2/10/2022  9:26 AM EST -----  Regarding: pantoprazole (PROTONIX) 40 MG EC tablet [[id number]]  Contact: [phone]  PT requesting a refill for pantoprazole (PROTONIX) 40 MG EC tablet [[id number]]. PT has an upcoming appointment with Lori MONTES on 2/15/2022 at 9:15 am. Please call pt once script is filled: [phone].

## 2022-02-14 RX ORDER — ALLOPURINOL 100 MG/1
TABLET ORAL
Qty: 30 TABLET | Refills: 3 | Status: SHIPPED | OUTPATIENT
Start: 2022-02-14 | End: 2022-07-05

## 2022-02-15 ENCOUNTER — OFFICE VISIT (OUTPATIENT)
Dept: GASTROENTEROLOGY | Facility: CLINIC | Age: 62
End: 2022-02-15

## 2022-02-15 VITALS — HEIGHT: 62 IN | WEIGHT: 244.8 LBS | TEMPERATURE: 97.3 F | BODY MASS INDEX: 45.05 KG/M2

## 2022-02-15 DIAGNOSIS — K21.9 GASTROESOPHAGEAL REFLUX DISEASE WITHOUT ESOPHAGITIS: Primary | ICD-10-CM

## 2022-02-15 DIAGNOSIS — K22.10 ULCER OF ESOPHAGUS WITHOUT BLEEDING: ICD-10-CM

## 2022-02-15 DIAGNOSIS — K63.5 POLYP OF COLON, UNSPECIFIED PART OF COLON, UNSPECIFIED TYPE: ICD-10-CM

## 2022-02-15 DIAGNOSIS — K58.2 IRRITABLE BOWEL SYNDROME WITH BOTH CONSTIPATION AND DIARRHEA: ICD-10-CM

## 2022-02-15 PROCEDURE — 99214 OFFICE O/P EST MOD 30 MIN: CPT | Performed by: NURSE PRACTITIONER

## 2022-02-15 RX ORDER — PANTOPRAZOLE SODIUM 40 MG/1
40 TABLET, DELAYED RELEASE ORAL DAILY
Qty: 90 TABLET | Refills: 3 | Status: SHIPPED | OUTPATIENT
Start: 2022-02-15

## 2022-02-15 NOTE — PROGRESS NOTES
Chief Complaint   Patient presents with   • Alternating constipation and diarrhea     a   • Abdominal Cramping       Laura Cerda is a  62 y.o. female here for a follow up visit for GERD.    HPI  62-year-old female presents today for a follow-up visit for GERD.  She is a patient of Dr. Rodríguez.  She was last seen in the office by me on 1/5/2021.  She has a history of GERD/esophageal ulcers and admits she does really well on Protonix 40 mg daily.  She is taking Pepcid as needed.  She tells me the Pepcid really seems to help especially at night sometimes.  Her last EGD and colonoscopy was in 2018.  She will be due again next year for screening scopes.  She does have a history of colon polyps.  She denies any dysphagia, reflux, abdominal pain, nausea and vomiting, rectal bleeding or melena.  She admits appetite is good and her weight is stable.  She has a history of chronic constipation and diarrhea but admits as long as she eats enough fiber and drinks more water her bowels move really well.  She tells me that lately she has been under a lot more stress at home with raising her grandchildren and she does feel like that that has messed with her bowels.  She also thinks dairy seems to be an aggravating factor so she has tried to limit that.  This past week her bowels have actually been moving much better.  She also has a history of vitamin D deficiency and takes a daily vitamin D supplement.  Past Medical History:   Diagnosis Date   • Arthritis     psoriatic   • Chest pain    • Chronic kidney disease    • Elevated PTHrP level    • Fatigue    • Fibromyalgia    • Fibromyalgia, primary    • Foot swelling    • GERD (gastroesophageal reflux disease)    • GERD (gastroesophageal reflux disease)    • History of Holter monitoring 08/22/2016   • Hyperlipidemia    • Hyperparathyroidism (HCC)    • Hypertension    • Hypothyroidism    • Joint pain     worsening   • Meniere's disease    • Nausea    • Osteoarthritis    • Palpitations     • PONV (postoperative nausea and vomiting)    • Postmenopausal    • Psoriatic arthritis (HCC)    • Rapid heart rate    • Raynaud disease    • Thyroid disease    • Vitamin D deficiency        Past Surgical History:   Procedure Laterality Date   • BREAST BIOPSY Right 2013    benign   • CATARACT EXTRACTION  17;17   •  SECTION     •  SECTION     • CHOLECYSTECTOMY     • COLONOSCOPY N/A 2018    Procedure: COLONOSCOPY INTO CECUM AND TERMINAL ILEUM WTIH COLD BIOPSIES;  Surgeon: Reji Rodríguez MD;  Location: [facility] ENDOSCOPY;  Service: Gastroenterology   • ENDOSCOPY N/A 2018    Procedure: ESOPHAGOGASTRODUODENOSCOPY WITH BIOPSIES;  Surgeon: Reji Rodríguez MD;  Location: [facility] ENDOSCOPY;  Service: Gastroenterology   • ENDOSCOPY N/A 2018    Procedure: ESOPHAGOGASTRODUODENOSCOPY;  Surgeon: Reji Rodríguez MD;  Location: [facility] ENDOSCOPY;  Service: Gastroenterology   • HEMORRHOIDECTOMY     • REPLACEMENT TOTAL KNEE Right 2020   • TUBAL ABDOMINAL LIGATION     • WISDOM TOOTH EXTRACTION         Scheduled Meds:    Continuous Infusions:No current facility-administered medications for this visit.      PRN Meds:.    Allergies   Allergen Reactions   • Augmentin [Amoxicillin-Pot Clavulanate] Hives     Temperature high   • Azithromycin Rash   • Ceclor [Cefaclor] Rash   • Lyrica [Pregabalin] Swelling   • Metformin GI Intolerance       Social History     Socioeconomic History   • Marital status:    • Number of children: 2   Tobacco Use   • Smoking status: Never Smoker   • Smokeless tobacco: Never Used   Vaping Use   • Vaping Use: Never used   Substance and Sexual Activity   • Alcohol use: No   • Drug use: No   • Sexual activity: Yes     Partners: Male     Birth control/protection: Post-menopausal       Family History   Problem Relation Age of Onset   • Cancer Mother         Breast   • Heart failure Mother    • Hypertension Mother    • Diabetes Mother     • Inflammatory bowel disease Mother    • Diverticulitis Mother    • Cancer Father         Colon   • Diabetes Father    • Arthritis Brother    • Heart disease Maternal Grandmother    • Stroke Maternal Grandmother    • Inflammatory bowel disease Maternal Grandmother    • Diverticulitis Maternal Grandmother    • Heart disease Maternal Grandfather    • Colon cancer Paternal Grandmother        Review of Systems   Constitutional: Negative for appetite change, chills, diaphoresis, fatigue, fever and unexpected weight change.   HENT: Negative for nosebleeds, postnasal drip, sore throat, trouble swallowing and voice change.    Respiratory: Negative for cough, choking, chest tightness, shortness of breath, wheezing and stridor.    Cardiovascular: Negative for chest pain, palpitations and leg swelling.   Gastrointestinal: Positive for abdominal distention, constipation and diarrhea. Negative for abdominal pain, anal bleeding, blood in stool, nausea, rectal pain and vomiting.   Endocrine: Negative for polydipsia, polyphagia and polyuria.   Musculoskeletal: Negative for gait problem.   Skin: Negative for rash and wound.   Allergic/Immunologic: Negative for food allergies.   Neurological: Negative for dizziness, speech difficulty and light-headedness.   Psychiatric/Behavioral: Negative for confusion, self-injury, sleep disturbance and suicidal ideas.       Vitals:    02/15/22 0913   Temp: 97.3 °F (36.3 °C)       Physical Exam  Constitutional:       General: She is not in acute distress.     Appearance: She is well-developed. She is not ill-appearing.   HENT:      Head: Normocephalic.   Eyes:      Pupils: Pupils are equal, round, and reactive to light.   Cardiovascular:      Rate and Rhythm: Normal rate and regular rhythm.      Heart sounds: Normal heart sounds.   Pulmonary:      Effort: Pulmonary effort is normal.      Breath sounds: Normal breath sounds.   Abdominal:      General: Bowel sounds are normal. There is no  distension.      Palpations: Abdomen is soft. There is no mass.      Tenderness: There is no abdominal tenderness. There is no guarding or rebound.      Hernia: No hernia is present.   Musculoskeletal:         General: Normal range of motion.   Skin:     General: Skin is warm and dry.   Neurological:      Mental Status: She is alert and oriented to person, place, and time.   Psychiatric:         Speech: Speech normal.         Behavior: Behavior normal.         Judgment: Judgment normal.         No radiology results for the last 7 days     Diagnoses and all orders for this visit:    1. Gastroesophageal reflux disease without esophagitis (Primary)    2. Ulcer of esophagus without bleeding  Overview:  Added automatically from request for surgery [id number]      3. Irritable bowel syndrome with both constipation and diarrhea    4. Polyp of colon, unspecified part of colon, unspecified type  Overview:  Added automatically from request for surgery [id number]      Other orders  -     pantoprazole (PROTONIX) 40 MG EC tablet; Take 1 tablet by mouth Daily.  Dispense: 90 tablet; Refill: 3      GERD seems well controlled currently on Protonix 40 mg daily and Pepcid 10 mg OTC as needed.  Continue GERD precautions.  Still struggling with IBS symptoms stress seems to make it worse as well as dairy.  Patient to work on avoiding dairy and working on stress management at home.  Recommend she increase water intake and exercise as tolerated.  Continue a high-fiber diet.  Next screening colonoscopy and EGD due next year.  Patient to call the office with any issues.  Patient to follow-up with me in 1 year.  Patient is agreeable to the plan.

## 2022-02-22 ENCOUNTER — PROCEDURE VISIT (OUTPATIENT)
Dept: OBSTETRICS AND GYNECOLOGY | Age: 62
End: 2022-02-22

## 2022-02-22 ENCOUNTER — OFFICE VISIT (OUTPATIENT)
Dept: OBSTETRICS AND GYNECOLOGY | Age: 62
End: 2022-02-22

## 2022-02-22 ENCOUNTER — APPOINTMENT (OUTPATIENT)
Dept: WOMENS IMAGING | Facility: HOSPITAL | Age: 62
End: 2022-02-22

## 2022-02-22 VITALS
BODY MASS INDEX: 45.3 KG/M2 | DIASTOLIC BLOOD PRESSURE: 76 MMHG | SYSTOLIC BLOOD PRESSURE: 128 MMHG | WEIGHT: 246.2 LBS | HEIGHT: 62 IN

## 2022-02-22 DIAGNOSIS — Z01.419 WELL WOMAN EXAM WITH ROUTINE GYNECOLOGICAL EXAM: Primary | ICD-10-CM

## 2022-02-22 DIAGNOSIS — E66.01 MORBID OBESITY DUE TO EXCESS CALORIES: ICD-10-CM

## 2022-02-22 DIAGNOSIS — Z12.31 VISIT FOR SCREENING MAMMOGRAM: Primary | ICD-10-CM

## 2022-02-22 PROCEDURE — 77063 BREAST TOMOSYNTHESIS BI: CPT | Performed by: RADIOLOGY

## 2022-02-22 PROCEDURE — 77063 BREAST TOMOSYNTHESIS BI: CPT | Performed by: PHYSICIAN ASSISTANT

## 2022-02-22 PROCEDURE — 77067 SCR MAMMO BI INCL CAD: CPT | Performed by: PHYSICIAN ASSISTANT

## 2022-02-22 PROCEDURE — 99396 PREV VISIT EST AGE 40-64: CPT | Performed by: PHYSICIAN ASSISTANT

## 2022-02-22 PROCEDURE — 77067 SCR MAMMO BI INCL CAD: CPT | Performed by: RADIOLOGY

## 2022-02-22 NOTE — PROGRESS NOTES
Subjective     Chief Complaint   Patient presents with   • Annual Exam     AE & MG today, Last AE 2021, Last pap 19 nml and HPV neg, DEXA 2019, Colonoscopy 2018, No problems       History of Present Illness    Laura Cerda is a 62 y.o.  who presents for annual exam.    She is doing well  Still taking care of grandkids-husbands daughter  They are doing well though  Have a court date in , hoping they will be able to go back to their parents     Have a camper and hoping to get into travelling  Plans to retire next month    No new med issues  Is on an antidepressant b/c of stress r/t having the grandkids  Did see GI recently and just needs f/u in a year  Will be due for [facility] then as well    Mammogram today  utd on pap      Obstetric History:  OB History        2    Para   2    Term   2            AB        Living   2       SAB        IAB        Ectopic        Molar        Multiple        Live Births   2               Menstrual History:     No LMP recorded. Patient is postmenopausal.         Current contraception: post menopausal status  History of abnormal Pap smear: no  Received Gardasil immunization: no  Perform regular self breast exam: yes - occl  Family history of uterine or ovarian cancer: no  Family History of colon cancer: no  Family history of breast cancer: yes - breast cancer in mom in her 60'ds    Mammogram: done today.  Colonoscopy: up to date.  DEXA: not indicated.    Exercise: not active  Calcium/Vitamin D: adequate intake    The following portions of the patient's history were reviewed and updated as appropriate: allergies, current medications, past family history, past medical history, past social history, past surgical history and problem list.    Review of Systems   All other systems reviewed and are negative.      Review of Systems   Constitutional: Negative for fatigue.   Respiratory: Negative for shortness of breath.    Gastrointestinal: Negative for  "abdominal pain.   Genitourinary: Negative for dysuria.   Neurological: Negative for headaches.   Psychiatric/Behavioral: Negative for dysphoric mood.         Objective   Physical Exam    /76   Ht 157.5 cm (62\")   Wt 112 kg (246 lb 3.2 oz)   Breastfeeding No   BMI 45.03 kg/m²   General:   alert, comfortable and no distress   Heart: regular rate and rhythm   Lungs: clear to auscultation bilaterally   Breast: normal appearance, no masses or tenderness, Inspection negative, No nipple retraction or dimpling, No nipple discharge or bleeding, No axillary or supraclavicular adenopathy, Normal to palpation without dominant masses   Neck: no adenopathy and no carotid bruit   Abdomen: {normal findings: soft, non-tender   CVA: Not performed today   Pelvis: External genitalia: normal general appearance  Vaginal: normal mucosa without prolapse or lesions  Cervix: normal appearance and difficult to clearly visualize  Adnexa: normal bimanual exam  Uterus: normal single, nontender  Exam limited by body habitus   Extremities: Not performed today   Neurologic: negative   Psychiatric: Normal affect, judgement, and mood     Assessment/Plan   Diagnoses and all orders for this visit:    1. Well woman exam with routine gynecological exam (Primary)    2. Morbid obesity due to excess calories (HCC)        All questions answered.  Breast self exam technique reviewed and patient encouraged to perform self-exam monthly.  Discussed healthy lifestyle modifications.  Recommended 30 minutes of aerobic exercise five times per week.  Discussed calcium needs to prevent osteoporosis.      Pap not done today, will do next year, hard to clearly visualize cervix  Mammogram utd  DEXA utd, can plan for next year  CSC due next year             "

## 2022-02-23 ENCOUNTER — OFFICE VISIT (OUTPATIENT)
Dept: FAMILY MEDICINE CLINIC | Facility: CLINIC | Age: 62
End: 2022-02-23

## 2022-02-23 VITALS — SYSTOLIC BLOOD PRESSURE: 127 MMHG | DIASTOLIC BLOOD PRESSURE: 87 MMHG

## 2022-02-23 DIAGNOSIS — E11.8 CONTROLLED TYPE 2 DIABETES MELLITUS WITH COMPLICATION, WITHOUT LONG-TERM CURRENT USE OF INSULIN: Primary | ICD-10-CM

## 2022-02-23 DIAGNOSIS — E55.9 VITAMIN D DEFICIENCY: ICD-10-CM

## 2022-02-23 DIAGNOSIS — I10 PRIMARY HYPERTENSION: ICD-10-CM

## 2022-02-23 PROCEDURE — 99443 PR PHYS/QHP TELEPHONE EVALUATION 21-30 MIN: CPT | Performed by: INTERNAL MEDICINE

## 2022-02-23 NOTE — PROGRESS NOTES
Subjective   Laura Cerda is a 62 y.o. female.     Vitals:    02/23/22 0932   BP: 127/87      There is no height or weight on file to calculate BMI.     History of Present Illness   Current outpatient and discharge medications have been reconciled for the patient.  Reviewed by: Guillermo Maldonado MD  Patient was seen for 22-minute phone encounter.  Patient was seen for diabetes.  Hemoglobin A1c 3 months ago was 6.6%.  Patient has not checked her sugar but will start checking it weekly.  Patient will continue present diet and activity levels.  Patient blood pressures been running 130s over 70s.  Patient will continue monitoring.  Patient does have vitamin D3 deficiency and is supplement with over-the-counter D3.  Patient is using 50,000 units weekly.  Vitamin D level was 42 3 months ago.  Levels will be rechecked and March.  Patient will continue present treatment and monitor blood pressure blood sugar weekly.    Dictated utilizing Dragon dictation. If there are questions or for further clarification, please contact me.  The following portions of the patient's history were reviewed and updated as appropriate: allergies, current medications, past family history, past medical history, past social history, past surgical history and problem list.    Review of Systems   Constitutional: Negative for fatigue and fever.   HENT: Positive for congestion. Negative for trouble swallowing.    Eyes: Negative for discharge and visual disturbance.   Respiratory: Negative for choking and shortness of breath.    Cardiovascular: Negative for chest pain and palpitations.   Gastrointestinal: Negative for abdominal pain and blood in stool.   Endocrine: Negative.    Genitourinary: Negative for genital sores and hematuria.   Musculoskeletal: Negative for gait problem and joint swelling.   Skin: Negative for color change, pallor, rash and wound.   Allergic/Immunologic: Positive for environmental allergies. Negative for immunocompromised state.    Neurological: Negative for facial asymmetry and speech difficulty.   Psychiatric/Behavioral: Negative for hallucinations and suicidal ideas.       Objective   Physical Exam  Constitutional:       Appearance: She is well-developed.   Pulmonary:      Effort: Pulmonary effort is normal.      Breath sounds: Normal breath sounds.   Neurological:      General: No focal deficit present.      Mental Status: She is oriented to person, place, and time. Mental status is at baseline.   Psychiatric:         Mood and Affect: Mood normal.         Behavior: Behavior normal.         Thought Content: Thought content normal.         Judgment: Judgment normal.         Assessment/Plan #1 monitor blood pressure blood sugar weekly #2 labs #3 continue present diet and activity levels  Problems Addressed this Visit        Cardiac and Vasculature    Hypertension    Relevant Orders    CBC (No Diff)    Comprehensive Metabolic Panel    Lipid Panel    Hemoglobin A1c    Vitamin D 25 Hydroxy       Endocrine and Metabolic    Vitamin D deficiency    Relevant Orders    CBC (No Diff)    Comprehensive Metabolic Panel    Lipid Panel    Hemoglobin A1c    Vitamin D 25 Hydroxy    Controlled type 2 diabetes mellitus with complication, without long-term current use of insulin (HCC) - Primary    Relevant Orders    CBC (No Diff)    Comprehensive Metabolic Panel    Lipid Panel    Hemoglobin A1c    Vitamin D 25 Hydroxy      Diagnoses     Diagnosis Codes Comments    Controlled type 2 diabetes mellitus with complication, without long-term current use of insulin (HCC)    -  Primary ICD-10-CM: E11.8  ICD-9-CM: 250.90     Primary hypertension     ICD-10-CM: I10  ICD-9-CM: 401.9     Vitamin D deficiency     ICD-10-CM: E55.9  ICD-9-CM: 268.9

## 2022-03-11 ENCOUNTER — LAB (OUTPATIENT)
Dept: FAMILY MEDICINE CLINIC | Facility: CLINIC | Age: 62
End: 2022-03-11

## 2022-03-11 DIAGNOSIS — I10 PRIMARY HYPERTENSION: ICD-10-CM

## 2022-03-11 DIAGNOSIS — E55.9 VITAMIN D DEFICIENCY: ICD-10-CM

## 2022-03-11 DIAGNOSIS — E11.8 CONTROLLED TYPE 2 DIABETES MELLITUS WITH COMPLICATION, WITHOUT LONG-TERM CURRENT USE OF INSULIN: ICD-10-CM

## 2022-03-11 LAB
25(OH)D3 SERPL-MCNC: 45.2 NG/ML (ref 30–100)
ALBUMIN SERPL-MCNC: 4.5 G/DL (ref 3.5–5.2)
ALBUMIN/GLOB SERPL: 1.7 G/DL
ALP SERPL-CCNC: 144 U/L (ref 39–117)
ALT SERPL W P-5'-P-CCNC: 19 U/L (ref 1–33)
ANION GAP SERPL CALCULATED.3IONS-SCNC: 10 MMOL/L (ref 5–15)
AST SERPL-CCNC: 23 U/L (ref 1–32)
BILIRUB SERPL-MCNC: 0.4 MG/DL (ref 0–1.2)
BUN SERPL-MCNC: 13 MG/DL (ref 8–23)
BUN/CREAT SERPL: 16 (ref 7–25)
CALCIUM SPEC-SCNC: 9.9 MG/DL (ref 8.6–10.5)
CHLORIDE SERPL-SCNC: 102 MMOL/L (ref 98–107)
CHOLEST SERPL-MCNC: 194 MG/DL (ref 0–200)
CO2 SERPL-SCNC: 30 MMOL/L (ref 22–29)
CREAT SERPL-MCNC: 0.81 MG/DL (ref 0.57–1)
DEPRECATED RDW RBC AUTO: 42.1 FL (ref 37–54)
EGFRCR SERPLBLD CKD-EPI 2021: 81.7 ML/MIN/1.73
ERYTHROCYTE [DISTWIDTH] IN BLOOD BY AUTOMATED COUNT: 13.7 % (ref 12.3–15.4)
GLOBULIN UR ELPH-MCNC: 2.7 GM/DL
GLUCOSE SERPL-MCNC: 108 MG/DL (ref 65–99)
HBA1C MFR BLD: 6.6 % (ref 4.8–5.6)
HCT VFR BLD AUTO: 40.7 % (ref 34–46.6)
HDLC SERPL-MCNC: 59 MG/DL (ref 40–60)
HGB BLD-MCNC: 13.3 G/DL (ref 12–15.9)
LDLC SERPL CALC-MCNC: 101 MG/DL (ref 0–100)
LDLC/HDLC SERPL: 1.62 {RATIO}
MCH RBC QN AUTO: 28 PG (ref 26.6–33)
MCHC RBC AUTO-ENTMCNC: 32.7 G/DL (ref 31.5–35.7)
MCV RBC AUTO: 85.7 FL (ref 79–97)
PLATELET # BLD AUTO: 214 10*3/MM3 (ref 140–450)
PMV BLD AUTO: 12.7 FL (ref 6–12)
POTASSIUM SERPL-SCNC: 4.9 MMOL/L (ref 3.5–5.2)
PROT SERPL-MCNC: 7.2 G/DL (ref 6–8.5)
RBC # BLD AUTO: 4.75 10*6/MM3 (ref 3.77–5.28)
SODIUM SERPL-SCNC: 142 MMOL/L (ref 136–145)
TRIGL SERPL-MCNC: 198 MG/DL (ref 0–150)
VLDLC SERPL-MCNC: 34 MG/DL (ref 5–40)
WBC NRBC COR # BLD: 9.42 10*3/MM3 (ref 3.4–10.8)

## 2022-03-11 PROCEDURE — 80053 COMPREHEN METABOLIC PANEL: CPT | Performed by: INTERNAL MEDICINE

## 2022-03-11 PROCEDURE — 85027 COMPLETE CBC AUTOMATED: CPT | Performed by: INTERNAL MEDICINE

## 2022-03-11 PROCEDURE — 36415 COLL VENOUS BLD VENIPUNCTURE: CPT | Performed by: INTERNAL MEDICINE

## 2022-03-11 PROCEDURE — 82306 VITAMIN D 25 HYDROXY: CPT | Performed by: INTERNAL MEDICINE

## 2022-03-11 PROCEDURE — 83036 HEMOGLOBIN GLYCOSYLATED A1C: CPT | Performed by: INTERNAL MEDICINE

## 2022-03-11 PROCEDURE — 80061 LIPID PANEL: CPT | Performed by: INTERNAL MEDICINE

## 2022-04-01 RX ORDER — LEVOTHYROXINE SODIUM 75 UG/1
TABLET ORAL
Qty: 30 TABLET | Refills: 1 | Status: SHIPPED | OUTPATIENT
Start: 2022-04-01 | End: 2022-06-17

## 2022-04-29 ENCOUNTER — TELEPHONE (OUTPATIENT)
Dept: FAMILY MEDICINE CLINIC | Facility: CLINIC | Age: 62
End: 2022-04-29

## 2022-04-29 ENCOUNTER — APPOINTMENT (OUTPATIENT)
Dept: CT IMAGING | Facility: HOSPITAL | Age: 62
End: 2022-04-29

## 2022-04-29 ENCOUNTER — TELEPHONE (OUTPATIENT)
Dept: GASTROENTEROLOGY | Facility: CLINIC | Age: 62
End: 2022-04-29

## 2022-04-29 ENCOUNTER — HOSPITAL ENCOUNTER (EMERGENCY)
Facility: HOSPITAL | Age: 62
Discharge: HOME | End: 2022-04-29
Attending: EMERGENCY MEDICINE | Admitting: EMERGENCY MEDICINE

## 2022-04-29 VITALS
TEMPERATURE: 98.8 F | SYSTOLIC BLOOD PRESSURE: 144 MMHG | OXYGEN SATURATION: 91 % | HEART RATE: 86 BPM | RESPIRATION RATE: 16 BRPM | DIASTOLIC BLOOD PRESSURE: 70 MMHG

## 2022-04-29 DIAGNOSIS — K57.32 SIGMOID DIVERTICULITIS: Primary | ICD-10-CM

## 2022-04-29 LAB
ALBUMIN SERPL-MCNC: 4.2 G/DL (ref 3.5–5.2)
ALBUMIN/GLOB SERPL: 1.5 G/DL
ALP SERPL-CCNC: 121 U/L (ref 39–117)
ALT SERPL W P-5'-P-CCNC: 22 U/L (ref 1–33)
ANION GAP SERPL CALCULATED.3IONS-SCNC: 14.3 MMOL/L (ref 5–15)
AST SERPL-CCNC: 22 U/L (ref 1–32)
BASOPHILS # BLD AUTO: 0.03 10*3/MM3 (ref 0–0.2)
BASOPHILS NFR BLD AUTO: 0.2 % (ref 0–1.5)
BILIRUB SERPL-MCNC: 0.5 MG/DL (ref 0–1.2)
BILIRUB UR QL STRIP: NEGATIVE
BUN SERPL-MCNC: 11 MG/DL (ref 8–23)
BUN/CREAT SERPL: 13.8 (ref 7–25)
CALCIUM SPEC-SCNC: 9.3 MG/DL (ref 8.6–10.5)
CHLORIDE SERPL-SCNC: 102 MMOL/L (ref 98–107)
CLARITY UR: CLEAR
CO2 SERPL-SCNC: 24.7 MMOL/L (ref 22–29)
COLOR UR: YELLOW
CREAT SERPL-MCNC: 0.8 MG/DL (ref 0.57–1)
DEPRECATED RDW RBC AUTO: 44.4 FL (ref 37–54)
EGFRCR SERPLBLD CKD-EPI 2021: 82.9 ML/MIN/1.73
EOSINOPHIL # BLD AUTO: 0.15 10*3/MM3 (ref 0–0.4)
EOSINOPHIL NFR BLD AUTO: 1.1 % (ref 0.3–6.2)
ERYTHROCYTE [DISTWIDTH] IN BLOOD BY AUTOMATED COUNT: 14.5 % (ref 12.3–15.4)
GLOBULIN UR ELPH-MCNC: 2.8 GM/DL
GLUCOSE SERPL-MCNC: 94 MG/DL (ref 65–99)
GLUCOSE UR STRIP-MCNC: NEGATIVE MG/DL
HCT VFR BLD AUTO: 37.4 % (ref 34–46.6)
HGB BLD-MCNC: 12.5 G/DL (ref 12–15.9)
HGB UR QL STRIP.AUTO: NEGATIVE
IMM GRANULOCYTES # BLD AUTO: 0.05 10*3/MM3 (ref 0–0.05)
IMM GRANULOCYTES NFR BLD AUTO: 0.4 % (ref 0–0.5)
KETONES UR QL STRIP: NEGATIVE
LEUKOCYTE ESTERASE UR QL STRIP.AUTO: NEGATIVE
LIPASE SERPL-CCNC: 21 U/L (ref 13–60)
LYMPHOCYTES # BLD AUTO: 1.77 10*3/MM3 (ref 0.7–3.1)
LYMPHOCYTES NFR BLD AUTO: 13.5 % (ref 19.6–45.3)
MCH RBC QN AUTO: 28.1 PG (ref 26.6–33)
MCHC RBC AUTO-ENTMCNC: 33.4 G/DL (ref 31.5–35.7)
MCV RBC AUTO: 84 FL (ref 79–97)
MONOCYTES # BLD AUTO: 1.31 10*3/MM3 (ref 0.1–0.9)
MONOCYTES NFR BLD AUTO: 10 % (ref 5–12)
NEUTROPHILS NFR BLD AUTO: 74.8 % (ref 42.7–76)
NEUTROPHILS NFR BLD AUTO: 9.84 10*3/MM3 (ref 1.7–7)
NITRITE UR QL STRIP: NEGATIVE
NRBC BLD AUTO-RTO: 0 /100 WBC (ref 0–0.2)
PH UR STRIP.AUTO: 5.5 [PH] (ref 5–8)
PLATELET # BLD AUTO: 250 10*3/MM3 (ref 140–450)
PMV BLD AUTO: 12.1 FL (ref 6–12)
POTASSIUM SERPL-SCNC: 3.6 MMOL/L (ref 3.5–5.2)
PROT SERPL-MCNC: 7 G/DL (ref 6–8.5)
PROT UR QL STRIP: NEGATIVE
RBC # BLD AUTO: 4.45 10*6/MM3 (ref 3.77–5.28)
SODIUM SERPL-SCNC: 141 MMOL/L (ref 136–145)
SP GR UR STRIP: 1.02 (ref 1–1.03)
UROBILINOGEN UR QL STRIP: NORMAL
WBC NRBC COR # BLD: 13.15 10*3/MM3 (ref 3.4–10.8)

## 2022-04-29 PROCEDURE — 36415 COLL VENOUS BLD VENIPUNCTURE: CPT

## 2022-04-29 PROCEDURE — 74177 CT ABD & PELVIS W/CONTRAST: CPT

## 2022-04-29 PROCEDURE — 81003 URINALYSIS AUTO W/O SCOPE: CPT | Performed by: EMERGENCY MEDICINE

## 2022-04-29 PROCEDURE — 85025 COMPLETE CBC W/AUTO DIFF WBC: CPT | Performed by: EMERGENCY MEDICINE

## 2022-04-29 PROCEDURE — 83690 ASSAY OF LIPASE: CPT | Performed by: EMERGENCY MEDICINE

## 2022-04-29 PROCEDURE — 25010000002 ONDANSETRON PER 1 MG: Performed by: EMERGENCY MEDICINE

## 2022-04-29 PROCEDURE — 96374 THER/PROPH/DIAG INJ IV PUSH: CPT

## 2022-04-29 PROCEDURE — 96375 TX/PRO/DX INJ NEW DRUG ADDON: CPT

## 2022-04-29 PROCEDURE — 25010000002 HYDROMORPHONE PER 4 MG: Performed by: EMERGENCY MEDICINE

## 2022-04-29 PROCEDURE — 25010000002 IOPAMIDOL 61 % SOLUTION: Performed by: EMERGENCY MEDICINE

## 2022-04-29 PROCEDURE — 99283 EMERGENCY DEPT VISIT LOW MDM: CPT

## 2022-04-29 PROCEDURE — 80053 COMPREHEN METABOLIC PANEL: CPT | Performed by: EMERGENCY MEDICINE

## 2022-04-29 RX ORDER — CIPROFLOXACIN 500 MG/1
500 TABLET, FILM COATED ORAL 2 TIMES DAILY
Qty: 20 TABLET | Refills: 0 | Status: SHIPPED | OUTPATIENT
Start: 2022-04-29 | End: 2022-05-13 | Stop reason: ALTCHOICE

## 2022-04-29 RX ORDER — METRONIDAZOLE 500 MG/1
500 TABLET ORAL 3 TIMES DAILY
Qty: 30 TABLET | Refills: 0 | Status: SHIPPED | OUTPATIENT
Start: 2022-04-29 | End: 2022-05-13

## 2022-04-29 RX ORDER — OXYCODONE HYDROCHLORIDE AND ACETAMINOPHEN 5; 325 MG/1; MG/1
1 TABLET ORAL ONCE
Status: COMPLETED | OUTPATIENT
Start: 2022-04-29 | End: 2022-04-29

## 2022-04-29 RX ORDER — ONDANSETRON HYDROCHLORIDE 8 MG/1
8 TABLET, FILM COATED ORAL EVERY 8 HOURS PRN
Qty: 10 TABLET | Refills: 0 | Status: SHIPPED | OUTPATIENT
Start: 2022-04-29 | End: 2022-09-08 | Stop reason: HOSPADM

## 2022-04-29 RX ORDER — SODIUM CHLORIDE 0.9 % (FLUSH) 0.9 %
10 SYRINGE (ML) INJECTION AS NEEDED
Status: DISCONTINUED | OUTPATIENT
Start: 2022-04-29 | End: 2022-04-29 | Stop reason: HOSPADM

## 2022-04-29 RX ORDER — HYDROMORPHONE HYDROCHLORIDE 1 MG/ML
0.5 INJECTION, SOLUTION INTRAMUSCULAR; INTRAVENOUS; SUBCUTANEOUS ONCE
Status: COMPLETED | OUTPATIENT
Start: 2022-04-29 | End: 2022-04-29

## 2022-04-29 RX ORDER — ONDANSETRON 2 MG/ML
4 INJECTION INTRAMUSCULAR; INTRAVENOUS ONCE
Status: COMPLETED | OUTPATIENT
Start: 2022-04-29 | End: 2022-04-29

## 2022-04-29 RX ADMIN — HYDROMORPHONE HYDROCHLORIDE 0.5 MG: 1 INJECTION, SOLUTION INTRAMUSCULAR; INTRAVENOUS; SUBCUTANEOUS at 13:03

## 2022-04-29 RX ADMIN — SODIUM CHLORIDE 500 ML: 9 INJECTION, SOLUTION INTRAVENOUS at 12:59

## 2022-04-29 RX ADMIN — ONDANSETRON 4 MG: 2 INJECTION INTRAMUSCULAR; INTRAVENOUS at 13:02

## 2022-04-29 RX ADMIN — OXYCODONE HYDROCHLORIDE AND ACETAMINOPHEN 1 TABLET: 5; 325 TABLET ORAL at 16:34

## 2022-04-29 RX ADMIN — IOPAMIDOL 85 ML: 612 INJECTION, SOLUTION INTRAVENOUS at 15:10

## 2022-04-29 NOTE — TELEPHONE ENCOUNTER
Caller: Laura Cerda    Relationship: Self    Best call back number: [phone]    What orders are you requesting (i.e. lab or imaging):CT SCAN  In what timeframe would the patient need to come in:ASAP    Where will you receive your lab/imaging services:     Additional notes: PATIENT IS HAVING REALLY BAD ABDOMINAL PAIN AND IS GOING TO SPECIALIST ON Monday BUT REALLY FEELS SHE NEEDS A CT SCAN ASAP

## 2022-04-29 NOTE — TELEPHONE ENCOUNTER
Patient will have to get seen for CT scan.  Recommend immediate care center emergency room today or try to get office appointment when available

## 2022-04-29 NOTE — TELEPHONE ENCOUNTER
Called pt and pt reports having had covid on 04/16 and had regular cold type symptoms.  Then yesterday she developed pain in her abd.   Pain is located in low abd.  Pt does hx of divericultis.  Pt also reports pain and pressure in rectum.  Pt denies fever but does have chills.   Pt is an 8 on the pain scale currently and reports that pain keeps increasing. .  Pt upset and tearful due to pain.  Pt reports diarrhea.    Advised pt we will send message to Romy MACHADO to update her but in the meantime advised pt to come to [facility] ER.  Pt verb understanding and states she will head that way now.

## 2022-04-29 NOTE — TELEPHONE ENCOUNTER
----- Message from Reid Araiza sent at 4/29/2022 10:10 AM EDT -----  Contact: [phone]  Pt is calling and says that she is in  pain and that she thinks her diverticulitis is coming come and she is wanting to know if we could send an order over to the ER do that they can already have it by the time she gets there.

## 2022-05-06 ENCOUNTER — OFFICE VISIT (OUTPATIENT)
Dept: FAMILY MEDICINE CLINIC | Facility: CLINIC | Age: 62
End: 2022-05-06

## 2022-05-06 VITALS
OXYGEN SATURATION: 95 % | DIASTOLIC BLOOD PRESSURE: 88 MMHG | HEART RATE: 80 BPM | SYSTOLIC BLOOD PRESSURE: 162 MMHG | WEIGHT: 248.2 LBS | HEIGHT: 62 IN | TEMPERATURE: 98.6 F | BODY MASS INDEX: 45.67 KG/M2

## 2022-05-06 DIAGNOSIS — J98.11 ATELECTASIS: ICD-10-CM

## 2022-05-06 DIAGNOSIS — I10 PRIMARY HYPERTENSION: ICD-10-CM

## 2022-05-06 DIAGNOSIS — M10.00 IDIOPATHIC GOUT, UNSPECIFIED CHRONICITY, UNSPECIFIED SITE: ICD-10-CM

## 2022-05-06 DIAGNOSIS — K76.0 FATTY LIVER: ICD-10-CM

## 2022-05-06 DIAGNOSIS — K57.92 DIVERTICULITIS: Primary | ICD-10-CM

## 2022-05-06 PROBLEM — U07.1 COVID-19 VIRUS DETECTED: Status: ACTIVE | Noted: 2022-05-06

## 2022-05-06 LAB — URATE SERPL-MCNC: 6.2 MG/DL (ref 2.4–5.7)

## 2022-05-06 PROCEDURE — 36415 COLL VENOUS BLD VENIPUNCTURE: CPT | Performed by: INTERNAL MEDICINE

## 2022-05-06 PROCEDURE — 84550 ASSAY OF BLOOD/URIC ACID: CPT | Performed by: INTERNAL MEDICINE

## 2022-05-06 PROCEDURE — 99214 OFFICE O/P EST MOD 30 MIN: CPT | Performed by: INTERNAL MEDICINE

## 2022-05-06 RX ORDER — CHLORAL HYDRATE 500 MG
CAPSULE ORAL
Qty: 120 CAPSULE | Refills: 3 | Status: SHIPPED | OUTPATIENT
Start: 2022-05-06 | End: 2022-10-18

## 2022-05-06 RX ORDER — NABUMETONE 500 MG/1
500 TABLET, FILM COATED ORAL 2 TIMES DAILY PRN
Qty: 60 TABLET | Refills: 3 | Status: SHIPPED | OUTPATIENT
Start: 2022-05-06 | End: 2023-02-23

## 2022-05-06 RX ORDER — FUROSEMIDE 20 MG/1
20 TABLET ORAL DAILY
Qty: 30 TABLET | Refills: 3 | Status: SHIPPED | OUTPATIENT
Start: 2022-05-06 | End: 2022-10-18

## 2022-05-06 RX ORDER — ERGOCALCIFEROL 1.25 MG/1
CAPSULE ORAL
Qty: 24 CAPSULE | Refills: 1 | Status: SHIPPED | OUTPATIENT
Start: 2022-05-06 | End: 2023-03-13 | Stop reason: SDUPTHER

## 2022-05-06 NOTE — PROGRESS NOTES
Subjective   Laura Cerda is a 62 y.o. female.     Vitals:    05/06/22 0925   BP: 162/88   Pulse: 80   Temp: 98.6 °F (37 °C)   SpO2: 95%      Body mass index is 45.4 kg/m².     History of Present Illness   Patient was seen for diverticulitis.  Patient was admitted to hospital with diverticulitis.  Patient was placed on antibiotics and discharged home.  Patient's pain and is greatly improved and she is not tender to palpation.  Patient was advised to get on bulking agents to help prevent the next episode.  Patient was also noticed to have a fatty liver and pulmonary atelectasis.  Patient was placed on Vit E 200 units daily, DASH diet, low impact exercise 30 minutes 3 to 4 days a week, incentive spirometer.  Patient was also advised to get a repeat CT scan in 1 month to make sure diverticulitis has cleared up.  Patient follows up with her GI doctor in a week.  Patient also has a history of gout and is on allopurinol 100 mg daily.  Patient is getting uric acid levels drawn today.  Patient was informed that furosemide could raise uric acid levels.    Dictated utilizing Dragon dictation. If there are questions or for further clarification, please contact me.  The following portions of the patient's history were reviewed and updated as appropriate: allergies, current medications, past family history, past medical history, past social history, past surgical history and problem list.    Review of Systems   Constitutional: Negative for fatigue and fever.   HENT: Positive for congestion. Negative for trouble swallowing.    Eyes: Negative for discharge and visual disturbance.   Respiratory: Negative for choking and shortness of breath.    Cardiovascular: Negative for chest pain and palpitations.   Gastrointestinal: Positive for abdominal pain. Negative for blood in stool.   Endocrine: Negative.    Genitourinary: Negative for genital sores and hematuria.   Musculoskeletal: Negative for gait problem and joint swelling.   Skin:  Negative for color change, pallor, rash and wound.   Allergic/Immunologic: Positive for environmental allergies. Negative for immunocompromised state.   Neurological: Negative for facial asymmetry and speech difficulty.   Psychiatric/Behavioral: Negative for hallucinations and suicidal ideas.       Objective   Physical Exam  Vitals and nursing note reviewed.   Constitutional:       Appearance: Normal appearance. She is well-developed.   HENT:      Head: Normocephalic and atraumatic.      Nose: Nose normal.      Mouth/Throat:      Mouth: Mucous membranes are moist.      Pharynx: Oropharynx is clear.   Eyes:      Extraocular Movements: Extraocular movements intact.      Conjunctiva/sclera: Conjunctivae normal.      Pupils: Pupils are equal, round, and reactive to light.   Cardiovascular:      Rate and Rhythm: Normal rate and regular rhythm.      Heart sounds: Normal heart sounds. No murmur heard.    No friction rub. No gallop.   Pulmonary:      Effort: Pulmonary effort is normal. No respiratory distress.      Breath sounds: Normal breath sounds. No stridor. No wheezing, rhonchi or rales.   Chest:      Chest wall: No tenderness.   Abdominal:      General: Bowel sounds are normal. There is no distension.      Palpations: Abdomen is soft. There is no mass.      Tenderness: There is no abdominal tenderness. There is no right CVA tenderness, left CVA tenderness, guarding or rebound.      Hernia: No hernia is present.   Musculoskeletal:         General: Normal range of motion.      Cervical back: Normal range of motion and neck supple.   Skin:     General: Skin is warm and dry.   Neurological:      General: No focal deficit present.      Mental Status: She is alert and oriented to person, place, and time. Mental status is at baseline.   Psychiatric:         Mood and Affect: Mood normal.         Behavior: Behavior normal.         Thought Content: Thought content normal.         Judgment: Judgment normal.          Assessment/Plan #1 uric acid #2 increase bulk agents to prevent diverticulitis 3 finish antibiotic No. 4 follow-up in 4 months with labs for 5 recommend CT scan of the abdomen in 1 month  Problems Addressed this Visit        Cardiac and Vasculature    Hypertension    Relevant Medications    furosemide (LASIX) 20 MG tablet       Gastrointestinal Abdominal     Fatty liver    Diverticulitis - Primary      Other Visit Diagnoses     Atelectasis        Relevant Orders    Incentive Spirometry    Idiopathic gout, unspecified chronicity, unspecified site        Relevant Orders    Uric Acid      Diagnoses     Diagnosis Codes Comments    Diverticulitis    -  Primary ICD-10-CM: K57.92  ICD-9-CM: 562.11     Atelectasis     ICD-10-CM: J98.11  ICD-9-CM: 518.0     Fatty liver     ICD-10-CM: K76.0  ICD-9-CM: 571.8     Primary hypertension     ICD-10-CM: I10  ICD-9-CM: 401.9     Idiopathic gout, unspecified chronicity, unspecified site     ICD-10-CM: M10.00  ICD-9-CM: 274.9

## 2022-05-13 ENCOUNTER — TELEPHONE (OUTPATIENT)
Dept: GASTROENTEROLOGY | Facility: CLINIC | Age: 62
End: 2022-05-13

## 2022-05-13 ENCOUNTER — OFFICE VISIT (OUTPATIENT)
Dept: GASTROENTEROLOGY | Facility: CLINIC | Age: 62
End: 2022-05-13

## 2022-05-13 VITALS
TEMPERATURE: 97.7 F | DIASTOLIC BLOOD PRESSURE: 89 MMHG | WEIGHT: 244.2 LBS | HEIGHT: 62 IN | HEART RATE: 87 BPM | SYSTOLIC BLOOD PRESSURE: 143 MMHG | OXYGEN SATURATION: 96 % | BODY MASS INDEX: 44.94 KG/M2

## 2022-05-13 DIAGNOSIS — Z86.010 HISTORY OF COLON POLYPS: ICD-10-CM

## 2022-05-13 DIAGNOSIS — K21.9 GASTROESOPHAGEAL REFLUX DISEASE WITHOUT ESOPHAGITIS: ICD-10-CM

## 2022-05-13 DIAGNOSIS — R93.3 ABNORMAL CT SCAN, COLON: ICD-10-CM

## 2022-05-13 DIAGNOSIS — K57.92 ACUTE DIVERTICULITIS: ICD-10-CM

## 2022-05-13 DIAGNOSIS — R10.32 LEFT LOWER QUADRANT ABDOMINAL PAIN: Primary | ICD-10-CM

## 2022-05-13 DIAGNOSIS — Z87.11 HISTORY OF PEPTIC ULCER DISEASE: ICD-10-CM

## 2022-05-13 PROCEDURE — 99214 OFFICE O/P EST MOD 30 MIN: CPT | Performed by: NURSE PRACTITIONER

## 2022-05-13 NOTE — PROGRESS NOTES
Chief Complaint   Patient presents with   • D-itis flare   • ED f/up       Laura Cerda is a  62 y.o. female here for a ER follow up visit for diverticulitis.    HPI    62-year-old female presents today for an ER follow-up visit for acute diverticulitis.  She is a patient of Dr. Rodríguez.  She was last seen in the office by me on 2/15/2022.  She was recently having worsening left sided abdominal pain and ended up on the ER at [facility] on 4/29/2022.  She had a CT scan of the abdomen pelvis done that showed:    IMPRESSION:  1.  Acute sigmoid diverticulitis with extensive subjacent  inflammation/phlegmon and small volume free fluid. Recommend attention  on follow-up to resolution after treatment and ensure up-to-date with  colonoscopy to exclude an underlying lesion.  2.  Mildly retroperitoneal nodes likely reactive.  3.  Hepatomegaly with diffuse steatosis.  4.  Please see above for additional findings/recommendations.     White count was elevated at 13.  She was given Cipro and Flagyl and given the opportunity to be admitted the patient did not want to be admitted and wanted to go home instead.  So she was discharged home and instructed to follow-up here.  She tells me since then she has finished antibiotics and is feeling somewhat better.  She tells me she is still having twinges of left lower sided abdominal pain.  Bowels are looking more normal.  She still try to eat a very bland diet.  She tells me she still feels quite fatigued.  She tells me currently her bowels are moving better.  She does have a history of colon polyps.  Her last EGD and colonoscopy was in 2018.  She does have a history of GERD/ulcers and admits she has been doing pretty well on Protonix 40 mg daily and Pepcid as needed.  She does admit lately she has been using more Pepcid at night.  She did recently just get over COVID-19 right before her diverticulitis flareup.  She denies any dysphagia, reflux, nausea and vomiting, rectal bleeding or melena.   She admits her appetite is improving and her weight appears stable.  Has a family history of colon cancer with her dad.  She tells me this has been the worst case of diverticulitis she is ever had.      Past Medical History:   Diagnosis Date   • Arthritis     psoriatic   • Chest pain    • Chronic kidney disease    • COVID-19 04/15/2022   • Elevated PTHrP level    • Fatigue    • Fibromyalgia    • Fibromyalgia, primary    • Foot swelling    • GERD (gastroesophageal reflux disease)    • GERD (gastroesophageal reflux disease)    • History of Holter monitoring 2016   • Hyperlipidemia    • Hyperparathyroidism (HCC)    • Hypertension    • Hypothyroidism    • Joint pain     worsening   • Meniere's disease    • Nausea    • Osteoarthritis    • Palpitations    • PONV (postoperative nausea and vomiting)    • Postmenopausal    • Psoriatic arthritis (HCC)    • Rapid heart rate    • Raynaud disease    • Thyroid disease    • Vitamin D deficiency        Past Surgical History:   Procedure Laterality Date   • BREAST BIOPSY Right 2013    benign   • CATARACT EXTRACTION  17;17   •  SECTION     •  SECTION     • CHOLECYSTECTOMY     • COLONOSCOPY N/A 2018    Procedure: COLONOSCOPY INTO CECUM AND TERMINAL ILEUM WTIH COLD BIOPSIES;  Surgeon: Reji Rodríguez MD;  Location: [facility] ENDOSCOPY;  Service: Gastroenterology   • ENDOSCOPY N/A 2018    Procedure: ESOPHAGOGASTRODUODENOSCOPY WITH BIOPSIES;  Surgeon: Reji Rodríguez MD;  Location: [facility] ENDOSCOPY;  Service: Gastroenterology   • ENDOSCOPY N/A 2018    Procedure: ESOPHAGOGASTRODUODENOSCOPY;  Surgeon: Reji Rodríguez MD;  Location: [facility] ENDOSCOPY;  Service: Gastroenterology   • HEMORRHOIDECTOMY     • REPLACEMENT TOTAL KNEE Right 2020   • TUBAL ABDOMINAL LIGATION     • WISDOM TOOTH EXTRACTION         Scheduled Meds:    Continuous Infusions:No current facility-administered medications for this  visit.      PRN Meds:.    Allergies   Allergen Reactions   • Augmentin [Amoxicillin-Pot Clavulanate] Hives     Temperature high   • Azithromycin Rash   • Ceclor [Cefaclor] Rash   • Lyrica [Pregabalin] Swelling   • Metformin GI Intolerance   • Penicillin G Other (See Comments)     Does not take because of augmentin       Social History     Socioeconomic History   • Marital status:    • Number of children: 2   Tobacco Use   • Smoking status: Never Smoker   • Smokeless tobacco: Never Used   Vaping Use   • Vaping Use: Never used   Substance and Sexual Activity   • Alcohol use: No   • Drug use: No   • Sexual activity: Yes     Partners: Male     Birth control/protection: Post-menopausal       Family History   Problem Relation Age of Onset   • Cancer Mother         Breast   • Heart failure Mother    • Hypertension Mother    • Diabetes Mother    • Inflammatory bowel disease Mother    • Diverticulitis Mother    • Breast cancer Mother    • Lung cancer Mother    • Cancer Father         Colon   • Diabetes Father    • Colon cancer Father    • Arthritis Brother    • Heart disease Maternal Grandmother    • Stroke Maternal Grandmother    • Inflammatory bowel disease Maternal Grandmother    • Diverticulitis Maternal Grandmother    • Heart disease Maternal Grandfather    • Colon cancer Paternal Grandmother    • Ovarian cancer Neg Hx    • Uterine cancer Neg Hx        Review of Systems   Constitutional: Positive for appetite change and fatigue. Negative for chills, diaphoresis, fever and unexpected weight change.   HENT: Negative for nosebleeds, postnasal drip, sore throat, trouble swallowing and voice change.    Respiratory: Negative for cough, choking, chest tightness, shortness of breath, wheezing and stridor.    Cardiovascular: Negative for chest pain, palpitations and leg swelling.   Gastrointestinal: Positive for abdominal distention and abdominal pain. Negative for anal bleeding, blood in stool, constipation, diarrhea,  nausea, rectal pain and vomiting.   Endocrine: Negative for polydipsia, polyphagia and polyuria.   Musculoskeletal: Negative for gait problem.   Skin: Negative for rash and wound.   Allergic/Immunologic: Negative for food allergies.   Neurological: Negative for dizziness, speech difficulty and light-headedness.   Psychiatric/Behavioral: Negative for confusion, self-injury, sleep disturbance and suicidal ideas.       Vitals:    05/13/22 1008   BP: 143/89   Pulse: 87   Temp: 97.7 °F (36.5 °C)   SpO2: 96%       Physical Exam  Constitutional:       General: She is not in acute distress.     Appearance: She is well-developed. She is not ill-appearing.   HENT:      Head: Normocephalic.   Eyes:      Pupils: Pupils are equal, round, and reactive to light.   Cardiovascular:      Rate and Rhythm: Normal rate and regular rhythm.      Heart sounds: Normal heart sounds.   Pulmonary:      Effort: Pulmonary effort is normal.      Breath sounds: Normal breath sounds.   Abdominal:      General: Bowel sounds are normal. There is distension.      Palpations: Abdomen is soft. There is no mass.      Tenderness: There is abdominal tenderness. There is no guarding or rebound.      Hernia: No hernia is present.       Musculoskeletal:         General: Normal range of motion.   Skin:     General: Skin is warm and dry.   Neurological:      Mental Status: She is alert and oriented to person, place, and time.   Psychiatric:         Speech: Speech normal.         Behavior: Behavior normal.         Judgment: Judgment normal.         No radiology results for the last 7 days     Diagnoses and all orders for this visit:    1. Left lower quadrant abdominal pain (Primary)  -     Case Request; Standing  -     COVID PRE-OP / PRE-PROCEDURE SCREENING ORDER (NO ISOLATION) - Swab, Nasopharynx; Future  -     Case Request  -     CBC & Differential  -     Comprehensive Metabolic Panel  -     CT Abdomen Pelvis With Contrast; Future    2. Acute diverticulitis  -      Case Request; Standing  -     COVID PRE-OP / PRE-PROCEDURE SCREENING ORDER (NO ISOLATION) - Swab, Nasopharynx; Future  -     Case Request  -     CBC & Differential  -     Comprehensive Metabolic Panel  -     CT Abdomen Pelvis With Contrast; Future    3. Abnormal CT scan, colon  -     Case Request; Standing  -     COVID PRE-OP / PRE-PROCEDURE SCREENING ORDER (NO ISOLATION) - Swab, Nasopharynx; Future  -     Case Request  -     CT Abdomen Pelvis With Contrast; Future    4. History of peptic ulcer disease  -     Case Request; Standing  -     COVID PRE-OP / PRE-PROCEDURE SCREENING ORDER (NO ISOLATION) - Swab, Nasopharynx; Future  -     Case Request    5. Gastroesophageal reflux disease without esophagitis  -     Case Request; Standing  -     COVID PRE-OP / PRE-PROCEDURE SCREENING ORDER (NO ISOLATION) - Swab, Nasopharynx; Future  -     Case Request    6. History of colon polyps  -     Case Request; Standing  -     COVID PRE-OP / PRE-PROCEDURE SCREENING ORDER (NO ISOLATION) - Swab, Nasopharynx; Future  -     Case Request    Other orders  -     Follow Anesthesia Guidelines / Protocol; Future  -     Obtain Informed Consent; Future      Reviewed ER records with her today.  We will check labs today and repeat a CT scan of the abdomen pelvis to make sure her diverticulitis has resolved.  She will need a colonoscopy in 8 to 10 weeks to assess healing.  Given her history we will go ahead and check an EGD as well.  Patient is agreeable to doing both scopes.  For now continue a low fiber/bland diet.  She has finished antibiotics.  Recommend she start a good daily probiotic.  Patient to call the office next week with an update.  Patient to follow-up with me in 2 to 3 weeks.  Patient is agreeable to the plan.

## 2022-05-14 LAB
ALBUMIN SERPL-MCNC: 4.4 G/DL (ref 3.8–4.8)
ALBUMIN/GLOB SERPL: 1.4 {RATIO} (ref 1.2–2.2)
ALP SERPL-CCNC: 103 IU/L (ref 44–121)
ALT SERPL-CCNC: 13 IU/L (ref 0–32)
AST SERPL-CCNC: 25 IU/L (ref 0–40)
BASOPHILS # BLD AUTO: 0.1 X10E3/UL (ref 0–0.2)
BASOPHILS NFR BLD AUTO: 1 %
BILIRUB SERPL-MCNC: 0.3 MG/DL (ref 0–1.2)
BUN SERPL-MCNC: 13 MG/DL (ref 8–27)
BUN/CREAT SERPL: 16 (ref 12–28)
CALCIUM SERPL-MCNC: 10 MG/DL (ref 8.7–10.3)
CHLORIDE SERPL-SCNC: 101 MMOL/L (ref 96–106)
CO2 SERPL-SCNC: 25 MMOL/L (ref 20–29)
CREAT SERPL-MCNC: 0.81 MG/DL (ref 0.57–1)
EGFRCR SERPLBLD CKD-EPI 2021: 82 ML/MIN/1.73
EOSINOPHIL # BLD AUTO: 0.3 X10E3/UL (ref 0–0.4)
EOSINOPHIL NFR BLD AUTO: 3 %
ERYTHROCYTE [DISTWIDTH] IN BLOOD BY AUTOMATED COUNT: 14.7 % (ref 11.7–15.4)
GLOBULIN SER CALC-MCNC: 3.1 G/DL (ref 1.5–4.5)
GLUCOSE SERPL-MCNC: 98 MG/DL (ref 65–99)
HCT VFR BLD AUTO: 39.8 % (ref 34–46.6)
HGB BLD-MCNC: 12.4 G/DL (ref 11.1–15.9)
IMM GRANULOCYTES # BLD AUTO: 0 X10E3/UL (ref 0–0.1)
IMM GRANULOCYTES NFR BLD AUTO: 0 %
LYMPHOCYTES # BLD AUTO: 2.1 X10E3/UL (ref 0.7–3.1)
LYMPHOCYTES NFR BLD AUTO: 24 %
MCH RBC QN AUTO: 26.7 PG (ref 26.6–33)
MCHC RBC AUTO-ENTMCNC: 31.2 G/DL (ref 31.5–35.7)
MCV RBC AUTO: 86 FL (ref 79–97)
MONOCYTES # BLD AUTO: 0.6 X10E3/UL (ref 0.1–0.9)
MONOCYTES NFR BLD AUTO: 7 %
NEUTROPHILS # BLD AUTO: 5.9 X10E3/UL (ref 1.4–7)
NEUTROPHILS NFR BLD AUTO: 65 %
PLATELET # BLD AUTO: 289 X10E3/UL (ref 150–450)
POTASSIUM SERPL-SCNC: 4.9 MMOL/L (ref 3.5–5.2)
PROT SERPL-MCNC: 7.5 G/DL (ref 6–8.5)
RBC # BLD AUTO: 4.65 X10E6/UL (ref 3.77–5.28)
SODIUM SERPL-SCNC: 143 MMOL/L (ref 134–144)
WBC # BLD AUTO: 9.1 X10E3/UL (ref 3.4–10.8)

## 2022-05-16 ENCOUNTER — TELEPHONE (OUTPATIENT)
Dept: GASTROENTEROLOGY | Facility: CLINIC | Age: 62
End: 2022-05-16

## 2022-05-16 NOTE — TELEPHONE ENCOUNTER
----- Message from JYOTI Benson sent at 5/16/2022 10:10 AM EDT -----  Please call the patient and let her know her labs look good.  Waiting on other testing.

## 2022-05-16 NOTE — TELEPHONE ENCOUNTER
Call to pt.  Advise per SANJIV Valderrama note.  Verb understanding.     States scheduling conflict - CT scheduled for 6/2 @ [place].  Asking if needs to be done sooner - if so, will check if may obtain at [facility].  Message to SANJIV Valderrama.

## 2022-05-16 NOTE — TELEPHONE ENCOUNTER
If she continues to improve 6/2 would be okay for the CT scan.  However if she is still feeling poorly would recommend we try to get a sooner appointment.  Thanks

## 2022-05-19 ENCOUNTER — HOSPITAL ENCOUNTER (OUTPATIENT)
Dept: CT IMAGING | Facility: HOSPITAL | Age: 62
Discharge: HOME | End: 2022-05-19
Admitting: NURSE PRACTITIONER

## 2022-05-19 DIAGNOSIS — R93.3 ABNORMAL CT SCAN, COLON: ICD-10-CM

## 2022-05-19 DIAGNOSIS — K57.92 ACUTE DIVERTICULITIS: ICD-10-CM

## 2022-05-19 DIAGNOSIS — R10.32 LEFT LOWER QUADRANT ABDOMINAL PAIN: ICD-10-CM

## 2022-05-19 PROCEDURE — 25010000002 IOPAMIDOL 61 % SOLUTION: Performed by: NURSE PRACTITIONER

## 2022-05-19 PROCEDURE — 74177 CT ABD & PELVIS W/CONTRAST: CPT

## 2022-05-19 PROCEDURE — 0 DIATRIZOATE MEGLUMINE & SODIUM PER 1 ML: Performed by: NURSE PRACTITIONER

## 2022-05-19 RX ADMIN — DIATRIZOATE MEGLUMINE AND DIATRIZOATE SODIUM 30 ML: 660; 100 LIQUID ORAL; RECTAL at 10:35

## 2022-05-19 RX ADMIN — IOPAMIDOL 95 ML: 612 INJECTION, SOLUTION INTRAVENOUS at 11:37

## 2022-05-24 ENCOUNTER — TELEPHONE (OUTPATIENT)
Dept: GASTROENTEROLOGY | Facility: CLINIC | Age: 62
End: 2022-05-24

## 2022-05-24 NOTE — TELEPHONE ENCOUNTER
----- Message from JYOTI Benson sent at 5/20/2022  1:18 PM EDT -----  IMPRESSION:  1.  Near complete resolution of sigmoid diverticulitis with minimal  residual pericolonic stranding/bowel wall thickening. Please ensure  up-to-date with colonoscopy to exclude underlying process.  2.  Hepatomegaly with diffuse hepatic steatosis.  3.  Please see above for additional chronic findings/recommendations    Please call the patient and let her know diverticulitis looks to be almost gone. Moving in the right direction. WE can discuss further at follow up. Thanks.

## 2022-05-27 ENCOUNTER — OFFICE VISIT (OUTPATIENT)
Dept: GASTROENTEROLOGY | Facility: CLINIC | Age: 62
End: 2022-05-27

## 2022-05-27 VITALS
HEIGHT: 62 IN | TEMPERATURE: 97.4 F | WEIGHT: 243 LBS | DIASTOLIC BLOOD PRESSURE: 89 MMHG | HEART RATE: 76 BPM | BODY MASS INDEX: 44.72 KG/M2 | SYSTOLIC BLOOD PRESSURE: 149 MMHG

## 2022-05-27 DIAGNOSIS — K76.0 FATTY LIVER: ICD-10-CM

## 2022-05-27 DIAGNOSIS — R10.30 LOWER ABDOMINAL PAIN: ICD-10-CM

## 2022-05-27 DIAGNOSIS — K57.92 DIVERTICULITIS: Primary | ICD-10-CM

## 2022-05-27 DIAGNOSIS — R93.5 ABNORMAL CT OF THE ABDOMEN: ICD-10-CM

## 2022-05-27 PROCEDURE — 99214 OFFICE O/P EST MOD 30 MIN: CPT | Performed by: NURSE PRACTITIONER

## 2022-05-27 NOTE — PROGRESS NOTES
Chief Complaint   Patient presents with   • Heartburn   • Diverticulitis   • Fatty Liver   • Abdominal Pain       Laura Cerda is a  62 y.o. female here for a follow up visit for abdominal pain.    HPI  2-year-old female presents today for follow-up visit for abdominal pain.  She is a patient of Dr. Rodríguez.  She was last seen in the office by me on 5/13/2022.  She was recently treated for acute diverticulitis.  She is happy to report that that pain has finally pretty much resolved.  Occasionally she might have a twinge of it but she admits she is significantly better.  She is slowly advancing her diet as tolerated.  She does me she still eating pretty blandly.  She is taking daily probiotics.  She had a repeat CT scan after last visit and it did show almost complete resolution of the diverticulitis and fatty liver disease.  She admits to fatty liver disease is new to her.  Most recent LFTs were normal.  She is scheduled for an EGD and colonoscopy on 8/15/2022.  She denies any dysphagia, nausea and vomiting, diarrhea, rectal bleeding or melena.  She admits appetite slowly improving and her weight is down 5 pounds since early May.  She has a family history of colon cancer with her father.  She also has a history of colon polyps.  Past Medical History:   Diagnosis Date   • Anemia during pregnancies   • Arthritis     psoriatic   • Chest pain    • Cholelithiasis 2001   • Chronic kidney disease    • Colon polyp 2004   • COVID-19 04/15/2022   • Diabetes mellitus (HCC) 2019   • Diverticulitis of colon    • Elevated PTHrP level    • Fatigue    • Fibromyalgia    • Fibromyalgia, primary    • Foot swelling    • GERD (gastroesophageal reflux disease)    • GERD (gastroesophageal reflux disease)    • Hernia 2oo1   • History of Holter monitoring 08/22/2016   • Hyperlipidemia    • Hyperparathyroidism (HCC)    • Hypertension    • Hypothyroidism    • Joint pain     worsening   • Meniere's disease    • Nausea    • Osteoarthritis    •  Palpitations    • PONV (postoperative nausea and vomiting)    • Postmenopausal    • Psoriatic arthritis (HCC)    • Rapid heart rate    • Raynaud disease    • Thyroid disease    • Vitamin D deficiency        Past Surgical History:   Procedure Laterality Date   • BREAST BIOPSY Right 2013    benign   • CATARACT EXTRACTION  17;17   •  SECTION     •  SECTION     • CHOLECYSTECTOMY     • COLONOSCOPY N/A 2018    Procedure: COLONOSCOPY INTO CECUM AND TERMINAL ILEUM WTIH COLD BIOPSIES;  Surgeon: Reji Rodríguez MD;  Location: [facility] ENDOSCOPY;  Service: Gastroenterology   • ENDOSCOPY N/A 2018    Procedure: ESOPHAGOGASTRODUODENOSCOPY WITH BIOPSIES;  Surgeon: Reji Rodríguez MD;  Location: [facility] ENDOSCOPY;  Service: Gastroenterology   • ENDOSCOPY N/A 2018    Procedure: ESOPHAGOGASTRODUODENOSCOPY;  Surgeon: Reji Rodríguez MD;  Location: [facility] ENDOSCOPY;  Service: Gastroenterology   • HEMORRHOIDECTOMY     • REPLACEMENT TOTAL KNEE Right 2020   • TUBAL ABDOMINAL LIGATION     • WISDOM TOOTH EXTRACTION         Scheduled Meds:    Continuous Infusions:No current facility-administered medications for this visit.      PRN Meds:.    Allergies   Allergen Reactions   • Augmentin [Amoxicillin-Pot Clavulanate] Hives     Temperature high   • Azithromycin Rash   • Ceclor [Cefaclor] Rash   • Lyrica [Pregabalin] Swelling   • Metformin GI Intolerance   • Penicillin G Other (See Comments)     Does not take because of augmentin       Social History     Socioeconomic History   • Marital status:    • Number of children: 2   Tobacco Use   • Smoking status: Never Smoker   • Smokeless tobacco: Never Used   Vaping Use   • Vaping Use: Never used   Substance and Sexual Activity   • Alcohol use: Never   • Drug use: Never   • Sexual activity: Yes     Partners: Male     Birth control/protection: Post-menopausal       Family History   Problem Relation Age of Onset   •  Cancer Mother         Breast   • Heart failure Mother    • Hypertension Mother    • Diabetes Mother    • Inflammatory bowel disease Mother    • Diverticulitis Mother    • Breast cancer Mother    • Lung cancer Mother    • Cancer Father         Colon   • Diabetes Father    • Colon cancer Father    • Arthritis Brother    • Heart disease Maternal Grandmother    • Stroke Maternal Grandmother    • Inflammatory bowel disease Maternal Grandmother    • Diverticulitis Maternal Grandmother    • Heart disease Maternal Grandfather    • Colon cancer Paternal Grandmother    • Ovarian cancer Neg Hx    • Uterine cancer Neg Hx        Review of Systems   Constitutional: Negative for appetite change, chills, diaphoresis, fatigue, fever and unexpected weight change.   HENT: Negative for nosebleeds, postnasal drip, sore throat, trouble swallowing and voice change.    Respiratory: Negative for cough, choking, chest tightness, shortness of breath, wheezing and stridor.    Cardiovascular: Negative for chest pain, palpitations and leg swelling.   Gastrointestinal: Positive for abdominal distention and abdominal pain. Negative for anal bleeding, blood in stool, constipation, diarrhea, nausea, rectal pain and vomiting.   Endocrine: Negative for polydipsia, polyphagia and polyuria.   Musculoskeletal: Negative for gait problem.   Skin: Negative for rash and wound.   Allergic/Immunologic: Negative for food allergies.   Neurological: Negative for dizziness, speech difficulty and light-headedness.   Psychiatric/Behavioral: Negative for confusion, self-injury, sleep disturbance and suicidal ideas.       Vitals:    05/27/22 1023   BP: 149/89   Pulse: 76   Temp: 97.4 °F (36.3 °C)       Physical Exam  Constitutional:       General: She is not in acute distress.     Appearance: She is well-developed. She is not ill-appearing.   HENT:      Head: Normocephalic.   Eyes:      Pupils: Pupils are equal, round, and reactive to light.   Cardiovascular:       Rate and Rhythm: Normal rate and regular rhythm.      Heart sounds: Normal heart sounds.   Pulmonary:      Effort: Pulmonary effort is normal.      Breath sounds: Normal breath sounds.   Abdominal:      General: Bowel sounds are normal. There is no distension.      Palpations: Abdomen is soft. There is no mass.      Tenderness: There is no abdominal tenderness. There is no guarding or rebound.      Hernia: No hernia is present.   Musculoskeletal:         General: Normal range of motion.   Skin:     General: Skin is warm and dry.   Neurological:      Mental Status: She is alert and oriented to person, place, and time.   Psychiatric:         Speech: Speech normal.         Behavior: Behavior normal.         Judgment: Judgment normal.         No radiology results for the last 7 days     Diagnoses and all orders for this visit:    1. Diverticulitis (Primary)    2. Fatty liver    3. Lower abdominal pain    4. Abnormal CT of the abdomen    Reviewed most recent CT scan results with her today.  Diverticulitis seems to be resolving.  Abdominal pain is significantly improved.  Bowels moving better.  Continue daily probiotics.  Continue to advance diet slowly as tolerated.  I would like her to start a daily fiber supplement.  We did discuss fatty liver disease and I would like her to really work on her diet and exercise and make sure she is getting enough sleep.  Most recent LFTs are normal.  She is scheduled for an EGD and colonoscopy in August.  I would like her to keep those scopes as planned.  Patient to call the office with any issues.  Patient to follow-up with me in 1 month.  Patient is agreeable to the plan.

## 2022-06-02 ENCOUNTER — APPOINTMENT (OUTPATIENT)
Dept: CT IMAGING | Facility: HOSPITAL | Age: 62
End: 2022-06-02

## 2022-06-06 RX ORDER — PIOGLITAZONEHYDROCHLORIDE 15 MG/1
TABLET ORAL
Qty: 30 TABLET | Refills: 3 | Status: SHIPPED | OUTPATIENT
Start: 2022-06-06 | End: 2022-10-17

## 2022-06-08 ENCOUNTER — TELEPHONE (OUTPATIENT)
Dept: GASTROENTEROLOGY | Facility: CLINIC | Age: 62
End: 2022-06-08

## 2022-06-08 NOTE — TELEPHONE ENCOUNTER
----- Message from Laura Cerda sent at 6/8/2022  8:48 AM EDT -----  Regarding: having pain  Romy,  I have been doing everything like you requested me to do.  I am taking the fiber gummies now & Florator daily.  I'm eating fruit, steamed vegetables & fish or chicken  grilled not fried.   My bowel movements have been all over the place.  One day I think I'm getting straighten out looking normal.  Then I start going alot & end up with diarrhea  But yesterday I started having alot pain  again on left colon area.  It still hurt this morning.  And it hurts when I move around or bend.  Pain on a level 1-10 mine is a 8  What do I need to do?  Laura Cerda  [phone]

## 2022-06-08 NOTE — TELEPHONE ENCOUNTER
I would do a liquid only diet today and rest up.  Okay to use heat if it helps.  If she is not significantly better by tomorrow morning would tell her to go on over to the ER and get evaluated with a CT scan and labs etc.

## 2022-06-09 ENCOUNTER — HOSPITAL ENCOUNTER (EMERGENCY)
Facility: HOSPITAL | Age: 62
Discharge: HOME | End: 2022-06-09
Attending: EMERGENCY MEDICINE | Admitting: EMERGENCY MEDICINE

## 2022-06-09 ENCOUNTER — APPOINTMENT (OUTPATIENT)
Dept: CT IMAGING | Facility: HOSPITAL | Age: 62
End: 2022-06-09

## 2022-06-09 VITALS
DIASTOLIC BLOOD PRESSURE: 84 MMHG | RESPIRATION RATE: 18 BRPM | SYSTOLIC BLOOD PRESSURE: 143 MMHG | HEART RATE: 64 BPM | TEMPERATURE: 97.9 F | OXYGEN SATURATION: 98 %

## 2022-06-09 DIAGNOSIS — K57.32 SIGMOID DIVERTICULITIS: Primary | ICD-10-CM

## 2022-06-09 LAB
ALBUMIN SERPL-MCNC: 4.4 G/DL (ref 3.5–5.2)
ALBUMIN/GLOB SERPL: 1.5 G/DL
ALP SERPL-CCNC: 103 U/L (ref 39–117)
ALT SERPL W P-5'-P-CCNC: 23 U/L (ref 1–33)
ANION GAP SERPL CALCULATED.3IONS-SCNC: 11 MMOL/L (ref 5–15)
AST SERPL-CCNC: 25 U/L (ref 1–32)
BASOPHILS # BLD AUTO: 0.08 10*3/MM3 (ref 0–0.2)
BASOPHILS NFR BLD AUTO: 0.9 % (ref 0–1.5)
BILIRUB SERPL-MCNC: 0.4 MG/DL (ref 0–1.2)
BILIRUB UR QL STRIP: NEGATIVE
BUN SERPL-MCNC: 9 MG/DL (ref 8–23)
BUN/CREAT SERPL: 11.5 (ref 7–25)
CALCIUM SPEC-SCNC: 9.8 MG/DL (ref 8.6–10.5)
CHLORIDE SERPL-SCNC: 100 MMOL/L (ref 98–107)
CLARITY UR: CLEAR
CO2 SERPL-SCNC: 27 MMOL/L (ref 22–29)
COLOR UR: YELLOW
CREAT SERPL-MCNC: 0.78 MG/DL (ref 0.57–1)
DEPRECATED RDW RBC AUTO: 49.4 FL (ref 37–54)
EGFRCR SERPLBLD CKD-EPI 2021: 85.5 ML/MIN/1.73
EOSINOPHIL # BLD AUTO: 0.29 10*3/MM3 (ref 0–0.4)
EOSINOPHIL NFR BLD AUTO: 3.3 % (ref 0.3–6.2)
ERYTHROCYTE [DISTWIDTH] IN BLOOD BY AUTOMATED COUNT: 15.7 % (ref 12.3–15.4)
GLOBULIN UR ELPH-MCNC: 3 GM/DL
GLUCOSE SERPL-MCNC: 100 MG/DL (ref 65–99)
GLUCOSE UR STRIP-MCNC: NEGATIVE MG/DL
HCT VFR BLD AUTO: 39.8 % (ref 34–46.6)
HGB BLD-MCNC: 12.7 G/DL (ref 12–15.9)
HGB UR QL STRIP.AUTO: NEGATIVE
HOLD SPECIMEN: NORMAL
HOLD SPECIMEN: NORMAL
IMM GRANULOCYTES # BLD AUTO: 0.03 10*3/MM3 (ref 0–0.05)
IMM GRANULOCYTES NFR BLD AUTO: 0.3 % (ref 0–0.5)
KETONES UR QL STRIP: NEGATIVE
LEUKOCYTE ESTERASE UR QL STRIP.AUTO: NEGATIVE
LIPASE SERPL-CCNC: 24 U/L (ref 13–60)
LYMPHOCYTES # BLD AUTO: 2.2 10*3/MM3 (ref 0.7–3.1)
LYMPHOCYTES NFR BLD AUTO: 25.3 % (ref 19.6–45.3)
MCH RBC QN AUTO: 27.9 PG (ref 26.6–33)
MCHC RBC AUTO-ENTMCNC: 31.9 G/DL (ref 31.5–35.7)
MCV RBC AUTO: 87.5 FL (ref 79–97)
MONOCYTES # BLD AUTO: 0.66 10*3/MM3 (ref 0.1–0.9)
MONOCYTES NFR BLD AUTO: 7.6 % (ref 5–12)
NEUTROPHILS NFR BLD AUTO: 5.45 10*3/MM3 (ref 1.7–7)
NEUTROPHILS NFR BLD AUTO: 62.6 % (ref 42.7–76)
NITRITE UR QL STRIP: NEGATIVE
NRBC BLD AUTO-RTO: 0 /100 WBC (ref 0–0.2)
PH UR STRIP.AUTO: 6 [PH] (ref 5–8)
PLATELET # BLD AUTO: 215 10*3/MM3 (ref 140–450)
PMV BLD AUTO: 12.5 FL (ref 6–12)
POTASSIUM SERPL-SCNC: 3.9 MMOL/L (ref 3.5–5.2)
PROT SERPL-MCNC: 7.4 G/DL (ref 6–8.5)
PROT UR QL STRIP: NEGATIVE
RBC # BLD AUTO: 4.55 10*6/MM3 (ref 3.77–5.28)
SODIUM SERPL-SCNC: 138 MMOL/L (ref 136–145)
SP GR UR STRIP: 1.01 (ref 1–1.03)
UROBILINOGEN UR QL STRIP: NORMAL
WBC NRBC COR # BLD: 8.71 10*3/MM3 (ref 3.4–10.8)
WHOLE BLOOD HOLD SPECIMEN: NORMAL

## 2022-06-09 PROCEDURE — 85025 COMPLETE CBC W/AUTO DIFF WBC: CPT | Performed by: EMERGENCY MEDICINE

## 2022-06-09 PROCEDURE — 25010000002 IOPAMIDOL 61 % SOLUTION: Performed by: EMERGENCY MEDICINE

## 2022-06-09 PROCEDURE — 80053 COMPREHEN METABOLIC PANEL: CPT | Performed by: EMERGENCY MEDICINE

## 2022-06-09 PROCEDURE — 81003 URINALYSIS AUTO W/O SCOPE: CPT | Performed by: EMERGENCY MEDICINE

## 2022-06-09 PROCEDURE — 25010000002 ONDANSETRON PER 1 MG: Performed by: EMERGENCY MEDICINE

## 2022-06-09 PROCEDURE — 74177 CT ABD & PELVIS W/CONTRAST: CPT

## 2022-06-09 PROCEDURE — 25010000002 MORPHINE PER 10 MG: Performed by: EMERGENCY MEDICINE

## 2022-06-09 PROCEDURE — 99283 EMERGENCY DEPT VISIT LOW MDM: CPT

## 2022-06-09 PROCEDURE — 96375 TX/PRO/DX INJ NEW DRUG ADDON: CPT

## 2022-06-09 PROCEDURE — 83690 ASSAY OF LIPASE: CPT | Performed by: EMERGENCY MEDICINE

## 2022-06-09 PROCEDURE — 96374 THER/PROPH/DIAG INJ IV PUSH: CPT

## 2022-06-09 RX ORDER — SODIUM CHLORIDE 0.9 % (FLUSH) 0.9 %
10 SYRINGE (ML) INJECTION AS NEEDED
Status: DISCONTINUED | OUTPATIENT
Start: 2022-06-09 | End: 2022-06-09 | Stop reason: HOSPADM

## 2022-06-09 RX ORDER — ONDANSETRON 2 MG/ML
4 INJECTION INTRAMUSCULAR; INTRAVENOUS ONCE
Status: COMPLETED | OUTPATIENT
Start: 2022-06-09 | End: 2022-06-09

## 2022-06-09 RX ORDER — METRONIDAZOLE 500 MG/1
500 TABLET ORAL 3 TIMES DAILY
Qty: 30 TABLET | Refills: 0 | Status: SHIPPED | OUTPATIENT
Start: 2022-06-09 | End: 2022-06-19

## 2022-06-09 RX ORDER — MORPHINE SULFATE 2 MG/ML
4 INJECTION, SOLUTION INTRAMUSCULAR; INTRAVENOUS ONCE
Status: COMPLETED | OUTPATIENT
Start: 2022-06-09 | End: 2022-06-09

## 2022-06-09 RX ORDER — METRONIDAZOLE 500 MG/1
500 TABLET ORAL 3 TIMES DAILY
Qty: 30 TABLET | Refills: 0 | Status: SHIPPED | OUTPATIENT
Start: 2022-06-09 | End: 2022-06-09 | Stop reason: SDUPTHER

## 2022-06-09 RX ORDER — CIPROFLOXACIN 500 MG/1
500 TABLET, FILM COATED ORAL 2 TIMES DAILY
Qty: 20 TABLET | Refills: 0 | Status: SHIPPED | OUTPATIENT
Start: 2022-06-09 | End: 2022-06-19

## 2022-06-09 RX ORDER — CIPROFLOXACIN 500 MG/1
500 TABLET, FILM COATED ORAL 2 TIMES DAILY
Qty: 20 TABLET | Refills: 0 | Status: SHIPPED | OUTPATIENT
Start: 2022-06-09 | End: 2022-06-09 | Stop reason: SDUPTHER

## 2022-06-09 RX ADMIN — ONDANSETRON 4 MG: 2 INJECTION INTRAMUSCULAR; INTRAVENOUS at 11:51

## 2022-06-09 RX ADMIN — MORPHINE SULFATE 4 MG: 2 INJECTION, SOLUTION INTRAMUSCULAR; INTRAVENOUS at 11:52

## 2022-06-09 RX ADMIN — IOPAMIDOL 85 ML: 612 INJECTION, SOLUTION INTRAVENOUS at 12:19

## 2022-06-09 RX ADMIN — SODIUM CHLORIDE 1000 ML: 9 INJECTION, SOLUTION INTRAVENOUS at 11:51

## 2022-06-09 NOTE — ED PROVIDER NOTES
EMERGENCY DEPARTMENT ENCOUNTER    Room Number:  07/07  Date seen:  6/9/2022  Time seen: 11:16 EDT  PCP: Guillermo Maldonado MD  Historian: patient      HPI:  Chief Complaint: abdominal pain    A complete HPI/ROS/PMH/PSH/SH/FH are unobtainable due to: none    Context: Laura Cerda is a 62 y.o. female who presents to the ED for evaluation of lower abdominal pain.  She has a history of recurrent diverticulitis, states this feels the same.  She had about a month and a half ago and took antibiotics, symptoms improved but never fully went away and yesterday escalated.  Her symptoms are constant, worse with bending over and walking, lying still makes it feel better.  She denies any fevers or chills, has had nausea without vomiting, oscillates between diarrhea and constipation.  Denies any urinary symptoms.  No other complaints at this time.        PAST MEDICAL HISTORY  Active Ambulatory Problems     Diagnosis Date Noted   • Fibromyalgia 08/05/2016   • Hypothyroid 08/05/2016   • Hyperlipidemia 08/05/2016   • Meniere's disease 08/05/2016   • Morbid obesity due to excess calories (HCC) 08/05/2016   • Psoriatic arthritis (HCC) 08/05/2016   • GERD (gastroesophageal reflux disease) 12/29/2016   • Hypertension 04/17/2017   • Elevated PTHrP level 03/23/2018   • Vitamin D deficiency 05/04/2018   • Hyperglycemia 05/04/2018   • Chronic fatigue 05/04/2018   • Primary hyperparathyroidism (HCC) 05/04/2018   • Constipation 08/17/2018   • Dysphagia 08/17/2018   • Diarrhea 08/17/2018   • Polyp of colon 08/17/2018   • Ulcer of esophagus without bleeding 09/13/2018   • Prediabetes 12/28/2018   • Controlled type 2 diabetes mellitus with complication, without long-term current use of insulin (HCC) 07/08/2020   • Hyperuricemia 07/08/2020   • Low back pain 05/19/2021   • Chronic gout of multiple sites 06/16/2021   • Diverticulosis 07/27/2021   • COVID-19 virus detected 05/06/2022   • Fatty liver 05/06/2022   • Diverticulitis 05/06/2022     Resolved  Ambulatory Problems     Diagnosis Date Noted   • Gastroesophageal reflux disease 2018     Past Medical History:   Diagnosis Date   • Anemia during pregnancies   • Arthritis    • Chest pain    • Cholelithiasis    • Chronic kidney disease    • Colon polyp    • COVID-19 04/15/2022   • Diabetes mellitus (HCC)    • Diverticulitis of colon    • Fatigue    • Fibromyalgia, primary    • Foot swelling    • Hernia 2oo1   • History of Holter monitoring 2016   • Hyperparathyroidism (HCC)    • Hypothyroidism    • Joint pain    • Nausea    • Osteoarthritis    • Palpitations    • PONV (postoperative nausea and vomiting)    • Postmenopausal    • Rapid heart rate    • Raynaud disease    • Thyroid disease          PAST SURGICAL HISTORY  Past Surgical History:   Procedure Laterality Date   • BREAST BIOPSY Right 2013    benign   • CATARACT EXTRACTION  17;17   •  SECTION     •  SECTION     • CHOLECYSTECTOMY     • COLONOSCOPY N/A 2018    Procedure: COLONOSCOPY INTO CECUM AND TERMINAL ILEUM WTIH COLD BIOPSIES;  Surgeon: Reji Rodríguez MD;  Location: [facility] ENDOSCOPY;  Service: Gastroenterology   • ENDOSCOPY N/A 2018    Procedure: ESOPHAGOGASTRODUODENOSCOPY WITH BIOPSIES;  Surgeon: Reji Rodríguez MD;  Location: [facility] ENDOSCOPY;  Service: Gastroenterology   • ENDOSCOPY N/A 2018    Procedure: ESOPHAGOGASTRODUODENOSCOPY;  Surgeon: Reji Rodríguez MD;  Location: [facility] ENDOSCOPY;  Service: Gastroenterology   • HEMORRHOIDECTOMY     • REPLACEMENT TOTAL KNEE Right 2020   • TUBAL ABDOMINAL LIGATION     • WISDOM TOOTH EXTRACTION           FAMILY HISTORY  Family History   Problem Relation Age of Onset   • Cancer Mother         Breast   • Heart failure Mother    • Hypertension Mother    • Diabetes Mother    • Inflammatory bowel disease Mother    • Diverticulitis Mother    • Breast cancer Mother    • Lung cancer Mother    • Cancer Father          Colon   • Diabetes Father    • Colon cancer Father    • Arthritis Brother    • Heart disease Maternal Grandmother    • Stroke Maternal Grandmother    • Inflammatory bowel disease Maternal Grandmother    • Diverticulitis Maternal Grandmother    • Heart disease Maternal Grandfather    • Colon cancer Paternal Grandmother    • Ovarian cancer Neg Hx    • Uterine cancer Neg Hx          SOCIAL HISTORY  Social History     Socioeconomic History   • Marital status:    • Number of children: 2   Tobacco Use   • Smoking status: Never Smoker   • Smokeless tobacco: Never Used   Vaping Use   • Vaping Use: Never used   Substance and Sexual Activity   • Alcohol use: Never   • Drug use: Never   • Sexual activity: Yes     Partners: Male     Birth control/protection: Post-menopausal         ALLERGIES  Augmentin [amoxicillin-pot clavulanate], Azithromycin, Ceclor [cefaclor], Lyrica [pregabalin], Metformin, and Penicillin g        REVIEW OF SYSTEMS  Review of Systems     All systems reviewed and negative except for those discussed in HPI.       PHYSICAL EXAM  ED Triage Vitals   Temp Heart Rate Resp BP SpO2   06/09/22 0919 06/09/22 0919 06/09/22 0919 06/09/22 0921 06/09/22 0919   97.9 °F (36.6 °C) 86 18 (!) 166/106 96 %      Temp src Heart Rate Source Patient Position BP Location FiO2 (%)   -- -- -- -- --                GENERAL: not distressed  HENT: atraumatic  EYES: no scleral icterus  CV: regular rhythm, regular rate  RESPIRATORY: normal effort  ABDOMEN: soft, moderate tenderness in the right lower quadrant, suprapubic area and left lower quadrant.  Nondistended normal bowel sounds no guarding or rigidity  MUSCULOSKELETAL: no deformity  NEURO: alert, moves all extremities, follows commands  SKIN: warm, dry    Vital signs and nursing notes reviewed.          LAB RESULTS  Recent Results (from the past 24 hour(s))   Comprehensive Metabolic Panel    Collection Time: 06/09/22 11:21 AM    Specimen: Blood   Result Value Ref Range     Glucose 100 (H) 65 - 99 mg/dL    BUN 9 8 - 23 mg/dL    Creatinine 0.78 0.57 - 1.00 mg/dL    Sodium 138 136 - 145 mmol/L    Potassium 3.9 3.5 - 5.2 mmol/L    Chloride 100 98 - 107 mmol/L    CO2 27.0 22.0 - 29.0 mmol/L    Calcium 9.8 8.6 - 10.5 mg/dL    Total Protein 7.4 6.0 - 8.5 g/dL    Albumin 4.40 3.50 - 5.20 g/dL    ALT (SGPT) 23 1 - 33 U/L    AST (SGOT) 25 1 - 32 U/L    Alkaline Phosphatase 103 39 - 117 U/L    Total Bilirubin 0.4 0.0 - 1.2 mg/dL    Globulin 3.0 gm/dL    A/G Ratio 1.5 g/dL    BUN/Creatinine Ratio 11.5 7.0 - 25.0    Anion Gap 11.0 5.0 - 15.0 mmol/L    eGFR 85.5 >60.0 mL/min/1.73   Lipase    Collection Time: 06/09/22 11:21 AM    Specimen: Blood   Result Value Ref Range    Lipase 24 13 - 60 U/L   Urinalysis With Microscopic If Indicated (No Culture) - Urine, Clean Catch    Collection Time: 06/09/22 11:21 AM    Specimen: Urine, Clean Catch   Result Value Ref Range    Color, UA Yellow Yellow, Straw    Appearance, UA Clear Clear    pH, UA 6.0 5.0 - 8.0    Specific Gravity, UA 1.014 1.005 - 1.030    Glucose, UA Negative Negative    Ketones, UA Negative Negative    Bilirubin, UA Negative Negative    Blood, UA Negative Negative    Protein, UA Negative Negative    Leuk Esterase, UA Negative Negative    Nitrite, UA Negative Negative    Urobilinogen, UA 0.2 E.U./dL 0.2 - 1.0 E.U./dL   Green Top (Gel)    Collection Time: 06/09/22 11:21 AM   Result Value Ref Range    Extra Tube Hold for add-ons.    Lavender Top    Collection Time: 06/09/22 11:21 AM   Result Value Ref Range    Extra Tube hold for add-on    Gold Top - SST    Collection Time: 06/09/22 11:21 AM   Result Value Ref Range    Extra Tube Hold for add-ons.    CBC Auto Differential    Collection Time: 06/09/22 11:21 AM    Specimen: Blood   Result Value Ref Range    WBC 8.71 3.40 - 10.80 10*3/mm3    RBC 4.55 3.77 - 5.28 10*6/mm3    Hemoglobin 12.7 12.0 - 15.9 g/dL    Hematocrit 39.8 34.0 - 46.6 %    MCV 87.5 79.0 - 97.0 fL    MCH 27.9 26.6 - 33.0 pg     MCHC 31.9 31.5 - 35.7 g/dL    RDW 15.7 (H) 12.3 - 15.4 %    RDW-SD 49.4 37.0 - 54.0 fl    MPV 12.5 (H) 6.0 - 12.0 fL    Platelets 215 140 - 450 10*3/mm3    Neutrophil % 62.6 42.7 - 76.0 %    Lymphocyte % 25.3 19.6 - 45.3 %    Monocyte % 7.6 5.0 - 12.0 %    Eosinophil % 3.3 0.3 - 6.2 %    Basophil % 0.9 0.0 - 1.5 %    Immature Grans % 0.3 0.0 - 0.5 %    Neutrophils, Absolute 5.45 1.70 - 7.00 10*3/mm3    Lymphocytes, Absolute 2.20 0.70 - 3.10 10*3/mm3    Monocytes, Absolute 0.66 0.10 - 0.90 10*3/mm3    Eosinophils, Absolute 0.29 0.00 - 0.40 10*3/mm3    Basophils, Absolute 0.08 0.00 - 0.20 10*3/mm3    Immature Grans, Absolute 0.03 0.00 - 0.05 10*3/mm3    nRBC 0.0 0.0 - 0.2 /100 WBC       Ordered the above labs and independently reviewed the results.        RADIOLOGY  CT Abdomen Pelvis With Contrast    Result Date: 6/9/2022  Narrative: CT ABDOMEN AND PELVIS WITH CONTRAST  HISTORY: Left lower quadrant abdominal pain.  TECHNIQUE: Axial CT images of the abdomen and pelvis were obtained following administration of intravenous contrast. The patient was not given oral contrast Coronal and sagittal reformats were obtained.  COMPARISON: 05/19/2022.  FINDINGS: There is marked colonic diverticulosis associated with wall thickening within the proximal sigmoid colon and pericolic fat stranding in a pattern similar to prior imaging from 04/29/2022, consistent with acute diverticulitis. There is no extraluminal air or fluid collection present. No evidence of bowel obstruction. The appendix is normal.  The liver demonstrates normal attenuation. No focal hepatic lesion or intrahepatic biliary dilatation. The gallbladder is surgically absent. The pancreas is normal without ductal dilatation. The spleen is normal in size and attenuation. Bilateral adrenal glands are normal. Both kidneys are normal in size and attenuation. No renal calculi or hydronephrosis. The urinary bladder is partially distended and normal.  The uterus is anteverted  and unremarkable for age. No abnormal adnexal mass is seen. Small retroperitoneal lymph nodes without pathological enlargement. These are favored to be reactive.  There is a small sliding hiatal hernia present. No pleural or pericardial effusion is seen.      Impression: CT findings of acute proximal sigmoid diverticulitis.  These findings were discussed with Felisha Galo by telephone at the time of dictation.  Radiation dose reduction techniques were utilized, including automated exposure control and exposure modulation based on body size.         I ordered the above noted radiological studies. Reviewed by me and discussed with radiologist.  See dictation for official radiology interpretation.    PROCEDURES  Procedures        MEDICATIONS GIVEN IN ER  Medications   sodium chloride 0.9 % flush 10 mL (has no administration in time range)   sodium chloride 0.9 % bolus 1,000 mL (0 mL Intravenous Stopped 6/9/22 1350)   ondansetron (ZOFRAN) injection 4 mg (4 mg Intravenous Given 6/9/22 1151)   morphine injection 4 mg (4 mg Intravenous Given 6/9/22 1152)   iopamidol (ISOVUE-300) 61 % injection 100 mL (85 mL Intravenous Given 6/9/22 1219)             PROGRESS AND CONSULTS    Differential diagnosis includes but is not limited to:  - hepatobiliary pathology such as cholecystitis, cholangitis, and symptomatic cholelithiasis  - Pancreatitis  - Dyspepsia  - Small bowel obstruction  - Appendicitis  - Diverticulitis  - UTI including pyelonephritis  - Ureteral stone  - Zoster  - Colitis, including infectious and ischemic  - Atypical ACS      ED Course as of 06/09/22 1520   Thu Jun 09, 2022   1119 Medical chart reviewed.  ER visit on 4/29/2022 for left lower quadrant abdominal pain.  History of diverticulitis.  While blood cell count was 13, otherwise unremarkable.  CT scan of the abdomen pelvis showed acute sigmoid diverticulitis with inflammation/phlegmon.  She was discharged on Cipro and Flagyl. [KA]   1230 I discussed the  patient with Dr. Rios, radiologist.  Patient has acute mild sigmoid diverticulitis without abscess or perforation. [KA]   1343 I reassessed the patient, she is resting comfortably.  We discussed her lab results and imaging findings consistent with acute mild diverticulitis.  She has had recurrent diverticulitis over the years.  She reports chronic pain that she treats often with liquid diet and dietary changes and still has multiple flares a year that would require antibiotics.  Today we will prescribe her Cipro and Flagyl, discharged to follow-up with general surgery and gastroenterology.  Counseled her to return to the ER for increasing pain, fever, vomiting and unable to keep her meds down or any concerns.  She is agreeable with the plan and stable for discharge. [KA]   1519 Lipase: 24 [KA]   1519 Glucose(!): 100 [KA]   1519 Creatinine: 0.78 [KA]   1519 Glucose: Negative [KA]   1519 WBC: 8.71 [KA]   1519 Hemoglobin: 12.7 [KA]      ED Course User Index  [KA] Felisha Galo PA             Patient was placed in face mask in first look. Patient was wearing facemask each time I entered the room and throughout our encounter. I wore protective equipment throughout this patient encounter including a face mask, eye shield and gloves. Hand hygiene was performed before donning protective equipment and after removal when leaving the room.        DIAGNOSIS  Final diagnoses:   Sigmoid diverticulitis         Follow Up:  Guillermo Maldonado MD  [address] [id number] [phone]          Guillermo Maldonado MD  [address] [address]  [phone]    In 2 days        RX:     Medication List      New Prescriptions    ciprofloxacin 500 MG tablet  Commonly known as: CIPRO  Take 1 tablet by mouth 2 (Two) Times a Day for 10 days.     metroNIDAZOLE 500 MG tablet  Commonly known as: FLAGYL  Take 1 tablet by mouth 3 (Three) Times a Day for 10 days.        Changed    fexofenadine 180 MG  tablet  Commonly known as: ALLEGRA  TAKE ONE TABLET BY MOUTH DAILY  What changed:   · when to take this  · reasons to take this     potassium chloride 10 MEQ CR tablet  1 po qday  What changed:   · when to take this  · reasons to take this           Where to Get Your Medications      These medications were sent to [facility] - [place] [address]    Hours: 7:00 AM-6:00 PM Mon-Fri, 8:00 AM-4:30 PM Sat-Sun (Closed 12-12:30PM) Phone: [phone]   · ciprofloxacin 500 MG tablet  · metroNIDAZOLE 500 MG tablet           Latest Documented Vital Signs:  As of 15:20 EDT  BP- 143/84 HR- 64 Temp- 97.9 °F (36.6 °C) O2 sat- 98%       Felisha Galo PA  06/09/22 1520

## 2022-06-09 NOTE — ED PROVIDER NOTES
MD ATTESTATION NOTE    The CASSIDY and I have discussed this patient's history, physical exam, and treatment plan.  I have reviewed the documentation and personally had a face to face interaction with the patient. I affirm the documentation and agree with the treatment and plan.  The attached note describes my personal findings.      I provided a substantive portion of the care of the patient.  I personally performed the physical exam in its entirety, and below are my findings.  For this patient encounter, the patient wore surgical mask, I wore full protective PPE including N95 and eye protection.      Brief HPI: Patient presents with left lower quadrant abdominal pain.  She has history of recurrent diverticulitis and finished a course of antibiotics about a month ago.  She follows with Dr. Rodríguez and was suggested to come to the ED when she called them about worsening symptoms.  She has very irregular bowel movements, has not had a fever that she is aware of, but the pain seems to be intensifying and is very familiar type pain to her.    PHYSICAL EXAM  ED Triage Vitals   Temp Heart Rate Resp BP SpO2   06/09/22 0919 06/09/22 0919 06/09/22 0919 06/09/22 0921 06/09/22 0919   97.9 °F (36.6 °C) 86 18 (!) 166/106 96 %      Temp src Heart Rate Source Patient Position BP Location FiO2 (%)   -- -- -- -- --                GENERAL: no acute distress  HENT: nares patent  EYES: no scleral icterus  CV: regular rhythm, normal rate  RESPIRATORY: normal effort  ABDOMEN: soft, obese, mild left lower quadrant tenderness without rebound or guarding bowel sounds are present  MUSCULOSKELETAL: no deformity  NEURO: alert, moves all extremities, follows commands  PSYCH:  calm, cooperative  SKIN: warm, dry    Vital signs and nursing notes reviewed.        Plan: Patient does not appear overtly toxic and she certainly does not have an acute abdomen, however I am concerned for yet another recurrence of diverticulitis and we will get a CT scan  with contrast to make sure there is no perforation or abscess associated with it that would require more aggressive measures.     Dwaine Horn MD  06/09/22 [id number]

## 2022-06-09 NOTE — DISCHARGE INSTRUCTIONS
Recheck with your PCP in 2 to 3 days  Take all the antibiotics until completed  Return to the emergency department for vomiting unable to keep her meds down, increasing pain, fevers, as needed

## 2022-06-17 RX ORDER — LEVOTHYROXINE SODIUM 75 UG/1
TABLET ORAL
Qty: 30 TABLET | Refills: 1 | Status: SHIPPED | OUTPATIENT
Start: 2022-06-17 | End: 2022-08-24

## 2022-06-27 ENCOUNTER — OFFICE VISIT (OUTPATIENT)
Dept: GASTROENTEROLOGY | Facility: CLINIC | Age: 62
End: 2022-06-27

## 2022-06-27 VITALS
BODY MASS INDEX: 44.46 KG/M2 | HEIGHT: 62 IN | DIASTOLIC BLOOD PRESSURE: 68 MMHG | TEMPERATURE: 97.6 F | OXYGEN SATURATION: 96 % | SYSTOLIC BLOOD PRESSURE: 119 MMHG | WEIGHT: 241.6 LBS | HEART RATE: 76 BPM

## 2022-06-27 DIAGNOSIS — R19.7 DIARRHEA, UNSPECIFIED TYPE: ICD-10-CM

## 2022-06-27 DIAGNOSIS — R93.3 ABNORMAL CT SCAN, COLON: ICD-10-CM

## 2022-06-27 DIAGNOSIS — K57.92 ACUTE DIVERTICULITIS: ICD-10-CM

## 2022-06-27 DIAGNOSIS — R10.32 LEFT LOWER QUADRANT ABDOMINAL PAIN: Primary | ICD-10-CM

## 2022-06-27 PROCEDURE — 99214 OFFICE O/P EST MOD 30 MIN: CPT | Performed by: NURSE PRACTITIONER

## 2022-06-27 RX ORDER — SULFAMETHOXAZOLE AND TRIMETHOPRIM 800; 160 MG/1; MG/1
1 TABLET ORAL 2 TIMES DAILY
Qty: 20 TABLET | Refills: 0 | Status: SHIPPED | OUTPATIENT
Start: 2022-06-27 | End: 2022-07-07

## 2022-06-27 RX ORDER — METRONIDAZOLE 500 MG/1
500 TABLET ORAL 2 TIMES DAILY
Qty: 20 TABLET | Refills: 0 | Status: SHIPPED | OUTPATIENT
Start: 2022-06-27 | End: 2022-07-07

## 2022-06-27 NOTE — PROGRESS NOTES
Chief Complaint   Patient presents with   • ED f/up     Diverticulitis       Laura Cerda is a  62 y.o. female here for a ER follow up visit for diverticulitis.    HPI  62-year-old female presents today for an ER follow-up visit for acute diverticulitis.  She is a patient of Dr. Rodríguez.  She was last seen in the office by me on 5/27/2022.  She was recently at the ER at [facility] on 6/9/2022 with worsening abdominal pain.  CT scan of the abdomen and pelvis was done showing acute sigmoid diverticulitis.  She was given a prescription for Cipro and Flagyl and discharged home to follow-up with us.  She tells me since taking the antibiotics she is doing better but still having abdominal pain.  She tells me initially the pain almost completely went away.  But now it is back and she is having left lower quadrant abdominal pain with diarrhea again despite putting her self on liquid only diet.  Last colonoscopy was on 8/24/2018.  Last EGD was on 11/2018.  She does have a history of colon polyps.  She does have a history of fatty liver disease.  Most recent LFTs were normal.  She denies any dysphagia, reflux, vomiting, constipation, rectal bleeding or melena.  She admits her appetite is decreased and her weight has dropped 3 pounds since early May of this year.  Has a family history with her father with colon cancer.  Past Medical History:   Diagnosis Date   • Anemia during pregnancies   • Arthritis     psoriatic   • Chest pain    • Cholelithiasis 2001   • Chronic kidney disease    • Colon polyp 2004   • COVID-19 04/15/2022   • Diabetes mellitus (HCC) 2019   • Diverticulitis of colon    • Elevated PTHrP level    • Fatigue    • Fibromyalgia    • Fibromyalgia, primary    • Foot swelling    • GERD (gastroesophageal reflux disease)    • GERD (gastroesophageal reflux disease)    • Hernia 2oo1   • History of Holter monitoring 08/22/2016   • Hyperlipidemia    • Hyperparathyroidism (HCC)    • Hypertension    • Hypothyroidism    •  Joint pain     worsening   • Meniere's disease    • Nausea    • Osteoarthritis    • Palpitations    • PONV (postoperative nausea and vomiting)    • Postmenopausal    • Psoriatic arthritis (HCC)    • Rapid heart rate    • Raynaud disease    • Thyroid disease    • Vitamin D deficiency        Past Surgical History:   Procedure Laterality Date   • BREAST BIOPSY Right 2013    benign   • CATARACT EXTRACTION  17;17   •  SECTION     •  SECTION     • CHOLECYSTECTOMY     • COLONOSCOPY N/A 2018    Procedure: COLONOSCOPY INTO CECUM AND TERMINAL ILEUM WTIH COLD BIOPSIES;  Surgeon: Reji Rodríguez MD;  Location: [facility] ENDOSCOPY;  Service: Gastroenterology   • ENDOSCOPY N/A 2018    Procedure: ESOPHAGOGASTRODUODENOSCOPY WITH BIOPSIES;  Surgeon: Reji Rodríguez MD;  Location: [facility] ENDOSCOPY;  Service: Gastroenterology   • ENDOSCOPY N/A 2018    Procedure: ESOPHAGOGASTRODUODENOSCOPY;  Surgeon: Reji Rodríguez MD;  Location: [facility] ENDOSCOPY;  Service: Gastroenterology   • HEMORRHOIDECTOMY     • REPLACEMENT TOTAL KNEE Right 2020   • TUBAL ABDOMINAL LIGATION     • WISDOM TOOTH EXTRACTION         Scheduled Meds:    Continuous Infusions:No current facility-administered medications for this visit.      PRN Meds:.    Allergies   Allergen Reactions   • Augmentin [Amoxicillin-Pot Clavulanate] Hives     Temperature high   • Azithromycin Rash   • Ceclor [Cefaclor] Rash   • Lyrica [Pregabalin] Swelling   • Metformin GI Intolerance   • Penicillin G Other (See Comments)     Does not take because of augmentin       Social History     Socioeconomic History   • Marital status:    • Number of children: 2   Tobacco Use   • Smoking status: Never Smoker   • Smokeless tobacco: Never Used   Vaping Use   • Vaping Use: Never used   Substance and Sexual Activity   • Alcohol use: Never   • Drug use: Never   • Sexual activity: Yes     Partners: Male     Birth  control/protection: Post-menopausal       Family History   Problem Relation Age of Onset   • Cancer Mother         Breast   • Heart failure Mother    • Hypertension Mother    • Diabetes Mother    • Inflammatory bowel disease Mother    • Diverticulitis Mother    • Breast cancer Mother    • Lung cancer Mother    • Cancer Father         Colon   • Diabetes Father    • Colon cancer Father    • Arthritis Brother    • Heart disease Maternal Grandmother    • Stroke Maternal Grandmother    • Inflammatory bowel disease Maternal Grandmother    • Diverticulitis Maternal Grandmother    • Heart disease Maternal Grandfather    • Colon cancer Paternal Grandmother    • Ovarian cancer Neg Hx    • Uterine cancer Neg Hx        Review of Systems   Constitutional: Negative for appetite change, chills, diaphoresis, fatigue, fever and unexpected weight change.   HENT: Negative for nosebleeds, postnasal drip, sore throat, trouble swallowing and voice change.    Respiratory: Negative for cough, choking, chest tightness, shortness of breath, wheezing and stridor.    Cardiovascular: Negative for chest pain, palpitations and leg swelling.   Gastrointestinal: Positive for abdominal distention, abdominal pain, diarrhea and nausea. Negative for anal bleeding, blood in stool, constipation, rectal pain and vomiting.   Endocrine: Negative for polydipsia, polyphagia and polyuria.   Musculoskeletal: Negative for gait problem.   Skin: Negative for rash and wound.   Allergic/Immunologic: Negative for food allergies.   Neurological: Negative for dizziness, speech difficulty and light-headedness.   Psychiatric/Behavioral: Negative for confusion, self-injury, sleep disturbance and suicidal ideas.       Vitals:    06/27/22 0946   BP: 119/68   Pulse: 76   Temp: 97.6 °F (36.4 °C)   SpO2: 96%       Physical Exam  Constitutional:       General: She is not in acute distress.     Appearance: She is well-developed. She is not ill-appearing.   HENT:      Head:  Normocephalic.   Eyes:      Pupils: Pupils are equal, round, and reactive to light.   Cardiovascular:      Rate and Rhythm: Normal rate and regular rhythm.      Heart sounds: Normal heart sounds.   Pulmonary:      Effort: Pulmonary effort is normal.      Breath sounds: Normal breath sounds.   Abdominal:      General: Bowel sounds are normal. There is distension.      Palpations: Abdomen is soft. There is no mass.      Tenderness: There is abdominal tenderness. There is no guarding or rebound.      Hernia: No hernia is present.   Musculoskeletal:         General: Normal range of motion.   Skin:     General: Skin is warm and dry.   Neurological:      Mental Status: She is alert and oriented to person, place, and time.   Psychiatric:         Speech: Speech normal.         Behavior: Behavior normal.         Judgment: Judgment normal.         No radiology results for the last 7 days     Diagnoses and all orders for this visit:    1. Left lower quadrant abdominal pain (Primary)  -     CBC & Differential  -     Comprehensive Metabolic Panel  -     Ambulatory Referral to General Surgery    2. Diarrhea, unspecified type  Overview:  Added automatically from request for surgery [id number]    Orders:  -     CBC & Differential  -     Comprehensive Metabolic Panel  -     Ambulatory Referral to General Surgery    3. Acute diverticulitis  -     CBC & Differential  -     Comprehensive Metabolic Panel  -     Ambulatory Referral to General Surgery    4. Abnormal CT scan, colon  -     Ambulatory Referral to General Surgery    Other orders  -     sulfamethoxazole-trimethoprim (Bactrim DS) 800-160 MG per tablet; Take 1 tablet by mouth 2 (Two) Times a Day for 10 days.  Dispense: 20 tablet; Refill: 0  -     metroNIDAZOLE (Flagyl) 500 MG tablet; Take 1 tablet by mouth 2 (Two) Times a Day for 10 days.  Dispense: 20 tablet; Refill: 0     Reviewed hospital records with her today.  Given her history and current symptoms we will restart  antibiotics.  This time we will trial Bactrim with Flagyl.  I do think with as many episodes of diverticulitis as she has had lately that a referral to a general surgeon would be warranted.  We will go ahead make referral to Dr. Montano.  We will go ahead and repeat labs today.  Patient to continue a low fiber/bland diet.  Patient to call the office next week with an update.  Patient to follow-up with me as planned.  Patient is agreeable to the plan.

## 2022-06-28 LAB
ALBUMIN SERPL-MCNC: 4.5 G/DL (ref 3.8–4.8)
ALBUMIN/GLOB SERPL: 1.5 {RATIO} (ref 1.2–2.2)
ALP SERPL-CCNC: 130 IU/L (ref 44–121)
ALT SERPL-CCNC: 18 IU/L (ref 0–32)
AST SERPL-CCNC: 22 IU/L (ref 0–40)
BASOPHILS # BLD AUTO: 0.1 X10E3/UL (ref 0–0.2)
BASOPHILS NFR BLD AUTO: 1 %
BILIRUB SERPL-MCNC: 0.2 MG/DL (ref 0–1.2)
BUN SERPL-MCNC: 10 MG/DL (ref 8–27)
BUN/CREAT SERPL: 10 (ref 12–28)
CALCIUM SERPL-MCNC: 10.2 MG/DL (ref 8.7–10.3)
CHLORIDE SERPL-SCNC: 102 MMOL/L (ref 96–106)
CO2 SERPL-SCNC: 24 MMOL/L (ref 20–29)
CREAT SERPL-MCNC: 0.96 MG/DL (ref 0.57–1)
EGFRCR SERPLBLD CKD-EPI 2021: 66 ML/MIN/1.73
EOSINOPHIL # BLD AUTO: 0.2 X10E3/UL (ref 0–0.4)
EOSINOPHIL NFR BLD AUTO: 2 %
ERYTHROCYTE [DISTWIDTH] IN BLOOD BY AUTOMATED COUNT: 15.7 % (ref 11.7–15.4)
GLOBULIN SER CALC-MCNC: 3.1 G/DL (ref 1.5–4.5)
GLUCOSE SERPL-MCNC: 99 MG/DL (ref 65–99)
HCT VFR BLD AUTO: 42.9 % (ref 34–46.6)
HGB BLD-MCNC: 13.4 G/DL (ref 11.1–15.9)
IMM GRANULOCYTES # BLD AUTO: 0.1 X10E3/UL (ref 0–0.1)
IMM GRANULOCYTES NFR BLD AUTO: 1 %
LYMPHOCYTES # BLD AUTO: 2.1 X10E3/UL (ref 0.7–3.1)
LYMPHOCYTES NFR BLD AUTO: 21 %
MCH RBC QN AUTO: 27.2 PG (ref 26.6–33)
MCHC RBC AUTO-ENTMCNC: 31.2 G/DL (ref 31.5–35.7)
MCV RBC AUTO: 87 FL (ref 79–97)
MONOCYTES # BLD AUTO: 0.7 X10E3/UL (ref 0.1–0.9)
MONOCYTES NFR BLD AUTO: 6 %
MORPHOLOGY BLD-IMP: ABNORMAL
NEUTROPHILS # BLD AUTO: 7 X10E3/UL (ref 1.4–7)
NEUTROPHILS NFR BLD AUTO: 69 %
PLATELET # BLD AUTO: 212 X10E3/UL (ref 150–450)
POTASSIUM SERPL-SCNC: 5.2 MMOL/L (ref 3.5–5.2)
PROT SERPL-MCNC: 7.6 G/DL (ref 6–8.5)
RBC # BLD AUTO: 4.93 X10E6/UL (ref 3.77–5.28)
SODIUM SERPL-SCNC: 143 MMOL/L (ref 134–144)
WBC # BLD AUTO: 10.2 X10E3/UL (ref 3.4–10.8)

## 2022-06-30 ENCOUNTER — TELEPHONE (OUTPATIENT)
Dept: GASTROENTEROLOGY | Facility: CLINIC | Age: 62
End: 2022-06-30

## 2022-07-05 RX ORDER — ALLOPURINOL 100 MG/1
TABLET ORAL
Qty: 30 TABLET | Refills: 3 | Status: SHIPPED | OUTPATIENT
Start: 2022-07-05 | End: 2022-11-14

## 2022-07-05 NOTE — TELEPHONE ENCOUNTER
----- Message from JYOTI Benson sent at 6/29/2022  3:16 PM EDT -----  Please call the patient and let her know her labs look good.  White count is back in normal range.  Hemoglobin is stable.  Thanks  
Patient notified of results and recommendations and verbalized understanding    
no

## 2022-07-18 ENCOUNTER — OFFICE VISIT (OUTPATIENT)
Dept: SURGERY | Facility: CLINIC | Age: 62
End: 2022-07-18

## 2022-07-18 VITALS — WEIGHT: 243 LBS | BODY MASS INDEX: 44.72 KG/M2 | HEIGHT: 62 IN

## 2022-07-18 DIAGNOSIS — K57.32 DIVERTICULITIS OF SIGMOID COLON: Primary | ICD-10-CM

## 2022-07-18 PROCEDURE — 99204 OFFICE O/P NEW MOD 45 MIN: CPT | Performed by: SURGERY

## 2022-07-18 RX ORDER — NEOMYCIN SULFATE 500 MG/1
1000 TABLET ORAL 3 TIMES DAILY
Qty: 6 TABLET | Refills: 0 | Status: SHIPPED | OUTPATIENT
Start: 2022-07-18 | End: 2022-07-19

## 2022-07-18 RX ORDER — ERYTHROMYCIN 500 MG/1
1000 TABLET, COATED ORAL 3 TIMES DAILY
Qty: 6 TABLET | Refills: 0 | Status: SHIPPED | OUTPATIENT
Start: 2022-07-18 | End: 2022-07-19

## 2022-07-20 ENCOUNTER — TELEPHONE (OUTPATIENT)
Dept: GASTROENTEROLOGY | Facility: CLINIC | Age: 62
End: 2022-07-20

## 2022-07-20 NOTE — TELEPHONE ENCOUNTER
----- Message from Laura Cerda sent at 7/20/2022 11:07 AM EDT -----  Regarding: Visit to surgeon   My appointment with Dr. Montano was Monday, he said I had chronic reaccurring diverticulitis and that I did need surgery in a section of my sigmoid   Colon. He advised me to cancel my upcoming scopes with Dr. Rodríguez. Surgery is setup for August 16th.  Just wanted to keep you in the loop.  Thank you for all the care you have getting me. I really appreciate it.   Thank you   Laura Cerda   [phone]

## 2022-07-21 NOTE — PROGRESS NOTES
ASSESSMENT/PLAN:    62-year-old lady with chronic recurrent diverticulitis and now persistent left lower quadrant abdominal pain following most recent episode.  She understands the options and wishes to proceed with laparoscopic sigmoid colectomy.  She understands the nature of the procedure and the risks including but not limited to bleeding, infection, conversion to open procedure, and postoperative anastomotic leak requiring reoperation and temporary ostomy.  Mechanical bowel preparation instructions given.  Oral antibiotic preparation prescribed and sent to her pharmacy.  Surgical and anesthetic risk increased secondary to BMI of 44 and multiple associated medical comorbidities.    CC:     Diverticulitis    HPI:    62-year-old lady who has had more than a decade history of chronic recurrent episodes of diverticulitis.  Episodes characterized by left lower quadrant abdominal pain and 1 episode in  required hospitalization.  Her most recent episode started in April of this year and although the more acute left lower quadrant pain has improved, persistent low-grade left lower quadrant pain has been present since that episode.    ENDOSCOPY:   • EGD 2018  • Colonoscopy 2018    RADIOLOGY:   • CT abdomen pelvis 2022: Findings of acute proximal sigmoid diverticulitis  • CT abdomen pelvis 2022: Near complete resolution of sigmoid diverticulitis  • CT abdomen pelvis 2022: Acute sigmoid diverticulitis with extensive subadjacent inflammation/phlegmon  • I reviewed images from all studies and concur    LABS:    • CMP 2022: Normal except alkaline phosphatase of 130  • CBC 2022: Normal    SOCIAL HISTORY:   • Denies tobacco use  • Denies alcohol use    FAMILY HISTORY:    • Colorectal cancer: Paternal grandmother  • Diverticulitis requiring surgery: Mother    PREVIOUS ABDOMINAL SURGERY    •  section x2  • Laparoscopic cholecystectomy    PAST MEDICAL HISTORY:    • Chronic kidney  disease  • Diabetes  • Diverticulitis  • Fibromyalgia  • Gastroesophageal reflux disease  • Hyperlipidemia  • Hypertension  • Hypothyroidism  • Hyperparathyroidism  • Ménière's disease  • Psoriatic arthritis    MEDICATIONS:   • Allopurinol  • Atorvastatin  • Allegra  • Lasix  • Relafen  • Zofran  • Protonix  • Actos  • Florastor  • Zoloft  • Zanaflex  • Unithyroid    ALLERGIES:   • Azithromycin-rash  • Cefaclor-rash  • Cedar-swelling  • Metformin-GI intolerance  • Penicillin/Augmentin-hives and shortness of air    PHYSICAL EXAM:   • Constitutional: Well-developed well-nourished, no acute distress  • Vital signs:   o Weight 243 pounds  o Height 62 inches  o BMI 44.4  • Neck: Supple, no palpable mass, trachea midline  • Respiratory: Normal inspiratory effort  • Cardiovascular: Regular rate  • Gastrointestinal: Soft, nontender  • Lymphatics (palpable nodes):  cervical-negative, axillary-negative  • Psychiatric: Alert and oriented ×3, normal affect     DANILO SIFUENTES M.D.

## 2022-07-28 ENCOUNTER — TELEPHONE (OUTPATIENT)
Dept: SURGERY | Facility: CLINIC | Age: 62
End: 2022-07-28

## 2022-07-28 RX ORDER — METRONIDAZOLE 500 MG/1
500 TABLET ORAL 3 TIMES DAILY
Qty: 30 TABLET | Refills: 0 | Status: SHIPPED | OUTPATIENT
Start: 2022-07-28 | End: 2022-08-07

## 2022-07-28 RX ORDER — LEVOFLOXACIN 500 MG/1
500 TABLET, FILM COATED ORAL DAILY
Qty: 10 TABLET | Refills: 0 | Status: SHIPPED | OUTPATIENT
Start: 2022-07-28 | End: 2022-08-07

## 2022-07-28 NOTE — TELEPHONE ENCOUNTER
Patient called in stating that she is having a diverticulitis flare up. She is asking for antibiotics to be called in so she can avoid going to the ER. The pharmacy is correct in her chart.

## 2022-08-02 ENCOUNTER — TELEPHONE (OUTPATIENT)
Dept: GASTROENTEROLOGY | Facility: CLINIC | Age: 62
End: 2022-08-02

## 2022-08-02 NOTE — TELEPHONE ENCOUNTER
Caller: Laura Cerda    Relationship: Self    Best call back number: [phone]    What is the best time to reach you: ANYTIME    Who are you requesting to speak with (clinical staff, provider,  specific staff member): CLINICAL STAFF/PEGGY MONTES    What was the call regarding:PT REC'D TEXT FROM [facility] CONFIRMING INFO FOR CANCELED PROCEDURE WITH DR. CAMARA. WANTED TO CONFIRM THAT COLONOSCOPY HAS BEEN CANCELED/[facility] INFORMED. WE ALSO CANCELED FOLLOW UP FOR 08.25.22    Do you require a callback: YES

## 2022-08-10 ENCOUNTER — TELEPHONE (OUTPATIENT)
Dept: FAMILY MEDICINE CLINIC | Facility: CLINIC | Age: 62
End: 2022-08-10

## 2022-08-10 RX ORDER — ATORVASTATIN CALCIUM 20 MG/1
20 TABLET, FILM COATED ORAL NIGHTLY
Qty: 90 TABLET | Refills: 1 | Status: SHIPPED | OUTPATIENT
Start: 2022-08-10 | End: 2023-03-02 | Stop reason: SDUPTHER

## 2022-08-10 NOTE — TELEPHONE ENCOUNTER
Caller: Laura Cerda    Relationship: Self    Best call back number:[phone]     Requested Prescriptions:   Requested Prescriptions     Pending Prescriptions Disp Refills   • atorvastatin (LIPITOR) 20 MG tablet 90 tablet 1     Sig: Take 1 tablet by mouth Every Night.        Pharmacy where request should be sent: [facility] [facility] - [phone]  - [phone] FX     Additional details provided by patient: OUT    Does the patient have less than a 3 day supply:  [x] Yes  [] No    Reid QUILES Rep   08/10/22 10:38 EDT

## 2022-08-10 NOTE — TELEPHONE ENCOUNTER
Caller: Laura Cerda    Relationship to patient: Self    Best call back number: [phone]    Patient is needing: PATIENT WANTED TO LET DR GARRISON KNOW THAT SHE IS GOING TO HAVE A COLON RESECTION LAPAROSCOPIC SIGMOID OR LOW ANTERIOR NEXT WEEK ON THE 16TH WITH Chris Montano MD.

## 2022-08-12 ENCOUNTER — PRE-ADMISSION TESTING (OUTPATIENT)
Dept: PREADMISSION TESTING | Facility: HOSPITAL | Age: 62
End: 2022-08-12

## 2022-08-12 VITALS
SYSTOLIC BLOOD PRESSURE: 159 MMHG | DIASTOLIC BLOOD PRESSURE: 86 MMHG | WEIGHT: 245.8 LBS | BODY MASS INDEX: 45.23 KG/M2 | OXYGEN SATURATION: 99 % | HEIGHT: 62 IN | RESPIRATION RATE: 16 BRPM | HEART RATE: 86 BPM | TEMPERATURE: 98.4 F

## 2022-08-12 DIAGNOSIS — K57.32 DIVERTICULITIS OF SIGMOID COLON: ICD-10-CM

## 2022-08-12 LAB
ALBUMIN SERPL-MCNC: 4 G/DL (ref 3.5–5.2)
ALBUMIN/GLOB SERPL: 1.5 G/DL
ALP SERPL-CCNC: 96 U/L (ref 39–117)
ALT SERPL W P-5'-P-CCNC: 18 U/L (ref 1–33)
ANION GAP SERPL CALCULATED.3IONS-SCNC: 11 MMOL/L (ref 5–15)
AST SERPL-CCNC: 24 U/L (ref 1–32)
BASOPHILS # BLD AUTO: 0.07 10*3/MM3 (ref 0–0.2)
BASOPHILS NFR BLD AUTO: 0.9 % (ref 0–1.5)
BILIRUB SERPL-MCNC: 0.3 MG/DL (ref 0–1.2)
BUN SERPL-MCNC: 9 MG/DL (ref 8–23)
BUN/CREAT SERPL: 11.4 (ref 7–25)
CALCIUM SPEC-SCNC: 9.7 MG/DL (ref 8.6–10.5)
CHLORIDE SERPL-SCNC: 104 MMOL/L (ref 98–107)
CO2 SERPL-SCNC: 27 MMOL/L (ref 22–29)
CREAT SERPL-MCNC: 0.79 MG/DL (ref 0.57–1)
DEPRECATED RDW RBC AUTO: 49.6 FL (ref 37–54)
EGFRCR SERPLBLD CKD-EPI 2021: 84.2 ML/MIN/1.73
EOSINOPHIL # BLD AUTO: 0.33 10*3/MM3 (ref 0–0.4)
EOSINOPHIL NFR BLD AUTO: 4.4 % (ref 0.3–6.2)
ERYTHROCYTE [DISTWIDTH] IN BLOOD BY AUTOMATED COUNT: 15.8 % (ref 12.3–15.4)
GLOBULIN UR ELPH-MCNC: 2.7 GM/DL
GLUCOSE SERPL-MCNC: 89 MG/DL (ref 65–99)
HCT VFR BLD AUTO: 40.3 % (ref 34–46.6)
HGB BLD-MCNC: 12.6 G/DL (ref 12–15.9)
IMM GRANULOCYTES # BLD AUTO: 0.01 10*3/MM3 (ref 0–0.05)
IMM GRANULOCYTES NFR BLD AUTO: 0.1 % (ref 0–0.5)
LYMPHOCYTES # BLD AUTO: 2.01 10*3/MM3 (ref 0.7–3.1)
LYMPHOCYTES NFR BLD AUTO: 27 % (ref 19.6–45.3)
MCH RBC QN AUTO: 27.2 PG (ref 26.6–33)
MCHC RBC AUTO-ENTMCNC: 31.3 G/DL (ref 31.5–35.7)
MCV RBC AUTO: 87 FL (ref 79–97)
MONOCYTES # BLD AUTO: 0.58 10*3/MM3 (ref 0.1–0.9)
MONOCYTES NFR BLD AUTO: 7.8 % (ref 5–12)
NEUTROPHILS NFR BLD AUTO: 4.44 10*3/MM3 (ref 1.7–7)
NEUTROPHILS NFR BLD AUTO: 59.8 % (ref 42.7–76)
NRBC BLD AUTO-RTO: 0 /100 WBC (ref 0–0.2)
PLATELET # BLD AUTO: 212 10*3/MM3 (ref 140–450)
PMV BLD AUTO: 12.2 FL (ref 6–12)
POTASSIUM SERPL-SCNC: 4.1 MMOL/L (ref 3.5–5.2)
PROT SERPL-MCNC: 6.7 G/DL (ref 6–8.5)
QT INTERVAL: 393 MS
RBC # BLD AUTO: 4.63 10*6/MM3 (ref 3.77–5.28)
SODIUM SERPL-SCNC: 142 MMOL/L (ref 136–145)
WBC NRBC COR # BLD: 7.44 10*3/MM3 (ref 3.4–10.8)

## 2022-08-12 PROCEDURE — 36415 COLL VENOUS BLD VENIPUNCTURE: CPT

## 2022-08-12 PROCEDURE — 85025 COMPLETE CBC W/AUTO DIFF WBC: CPT

## 2022-08-12 PROCEDURE — 93005 ELECTROCARDIOGRAM TRACING: CPT

## 2022-08-12 PROCEDURE — 93010 ELECTROCARDIOGRAM REPORT: CPT | Performed by: INTERNAL MEDICINE

## 2022-08-12 PROCEDURE — 80053 COMPREHEN METABOLIC PANEL: CPT

## 2022-08-12 RX ORDER — ERYTHROMYCIN 500 MG/1
TABLET, COATED ORAL
COMMUNITY
Start: 2022-07-19 | End: 2022-09-08 | Stop reason: HOSPADM

## 2022-08-12 NOTE — DISCHARGE INSTRUCTIONS
CHLORHEXIDINE CLOTH INSTRUCTIONS  The morning of surgery follow these instructions using the Chlorhexidine cloths you've been given.  These steps reduce bacteria on the body.  Do not use the cloths near your eyes, ears mouth, genitalia or on open wounds.  Throw the cloths away after use but do not try to flush them down a toilet.      Open and remove one cloth at a time from the package.    Leave the cloth unfolded and begin the bathing.  Massage the skin with the cloths using gentle pressure to remove bacteria.  Do not scrub harshly.   Follow the steps below with one 2% CHG cloth per area (6 total cloths).  One cloth for neck, shoulders and chest.  One cloth for both arms, hands, fingers and underarms (do underarms last).  One cloth for the abdomen followed by groin.  One cloth for right leg and foot including between the toes.  One cloth for left leg and foot including between the toes.  The last cloth is to be used for the back of the neck, back and buttocks.    Allow the CHG to air dry 3 minutes on the skin which will give it time to work and decrease the chance of irritation.  The skin may feel sticky until it is dry.  Do not rinse with water or any other liquid or you will lose the beneficial effects of the CHG.  If mild skin irritation occurs, do rinse the skin to remove the CHG.  Report this to the nurse at time of admission.  Do not apply lotions, creams, ointments, deodorants or perfumes after using the clothes. Dress in clean clothes before coming to the hospital.     Take the following medications the morning of surgery: UNITHYROID    ARRIVAL TIME 07:30      If you are on prescription narcotic pain medication to control your pain you may also take that medication the morning of surgery.    General Instructions:  Do not eat solid food after midnight the night before surgery.  You may drink clear liquids day of surgery but must stop at least one hour before your hospital arrival time.  It is beneficial for  you to have a clear drink that contains carbohydrates the day of surgery.  We suggest a 12 to 20 ounce bottle of Gatorade or Powerade for non-diabetic patients or a 12 to 20 ounce bottle of G2 or Powerade Zero for diabetic patients. (Pediatric patients, are not advised to drink a 12 to 20 ounce carbohydrate drink)    Clear liquids are liquids you can see through.  Nothing red in color.     Plain water                               Sports drinks  Sodas                                   Gelatin (Jell-O)  Fruit juices without pulp such as white grape juice and apple juice  Popsicles that contain no fruit or yogurt  Tea or coffee (no cream or milk added)  Gatorade / Powerade  G2 / Powerade Zero  Patients who avoid smoking, chewing tobacco and alcohol for 4 weeks prior to surgery have a reduced risk of post-operative complications.  Quit smoking as many days before surgery as you can.  Do not smoke, use chewing tobacco or drink alcohol the day of surgery.   If applicable bring your C-PAP/ BI-PAP machine.  Bring any papers given to you in the doctor’s office.  Wear clean comfortable clothes.  Do not wear contact lenses, false eyelashes or make-up.  Bring a case for your glasses.   Bring crutches or walker if applicable.  Remove all piercings.  Leave jewelry and any other valuables at home.  Hair extensions with metal clips must be removed prior to surgery.  The Pre-Admission Testing nurse will instruct you to bring medications if unable to obtain an accurate list in Pre-Admission Testing.      Preventing a Surgical Site Infection:  For 2 to 3 days before surgery, avoid shaving with a razor because the razor can irritate skin and make it easier to develop an infection.    Any areas of open skin can increase the risk of a post-operative wound infection by allowing bacteria to enter and travel throughout the body.  Notify your surgeon if you have any skin wounds / rashes even if it is not near the expected surgical site.   The area will need assessed to determine if surgery should be delayed until it is healed.  The night prior to surgery shower using a fresh bar of anti-bacterial soap (such as Dial) and clean washcloth.  Sleep in a clean bed with clean clothing.  Do not allow pets to sleep with you.  Shower on the morning of surgery using a fresh bar of anti-bacterial soap (such as Dial) and clean washcloth.  Dry with a clean towel and dress in clean clothing.  Ask your surgeon if you will be receiving antibiotics prior to surgery.  Make sure you, your family, and all healthcare providers clean their hands with soap and water or an alcohol based hand  before caring for you or your wound.    Day of surgery:  Your arrival time is approximately two hours before your scheduled surgery time.  Upon arrival, a Pre-op nurse and Anesthesiologist will review your health history, obtain vital signs, and answer questions you may have.  The only belongings needed at this time will be a list of your home medications and if applicable your C-PAP/BI-PAP machine.  A Pre-op nurse will start an IV and you may receive medication in preparation for surgery, including something to help you relax.     Please be aware that surgery does come with discomfort.  We want to make every effort to control your discomfort so please discuss any uncontrolled symptoms with your nurse.   Your doctor will most likely have prescribed pain medications.      If you are going home after surgery you will receive individualized written care instructions before being discharged.  A responsible adult must drive you to and from the hospital on the day of your surgery and stay with you for 24 hours.  Discharge prescriptions can be filled by the hospital pharmacy during regular pharmacy hours.  If you are having surgery late in the day/evening your prescription may be e-prescribed to your pharmacy.  Please verify your pharmacy hours or chose a 24 hour pharmacy to avoid not  having access to your prescription because your pharmacy has closed for the day.    If you are staying overnight following surgery, you will be transported to your hospital room following the recovery period.  [facility] has all private rooms.    If you have any questions please call Pre-Admission Testing at [phone].  Deductibles and co-payments are collected on the day of service. Please be prepared to pay the required co-pay, deductible or deposit on the day of service as defined by your plan.    Patient Education for Self-Quarantine Process    Following your COVID testing, we strongly recommend that you wear a mask when you are with other people and practice social distancing.   Limit your activities to only required outings.  Wash your hands with soap and water frequently for at least 20 seconds.   Avoid touching your eyes, nose and mouth with unwashed hands.  Do not share anything - utensils, drinking glasses, food from the same bowl.   Sanitize household surfaces daily. Include all high touch areas (door handles, light switches, phones, countertops, etc.)    Call your surgeon immediately if you experience any of the following symptoms:  Sore Throat  Shortness of Breath or difficulty breathing  Cough  Chills  Body soreness or muscle pain  Headache  Fever  New loss of taste or smell  Do not arrive for your surgery ill.  Your procedure will need to be rescheduled to another time.  You will need to call your physician before the day of surgery to avoid any unnecessary exposure to hospital staff as well as other patients.

## 2022-08-13 ENCOUNTER — LAB (OUTPATIENT)
Dept: LAB | Facility: HOSPITAL | Age: 62
End: 2022-08-13

## 2022-08-13 DIAGNOSIS — K57.32 DIVERTICULITIS OF SIGMOID COLON: ICD-10-CM

## 2022-08-13 LAB — SARS-COV-2 ORF1AB RESP QL NAA+PROBE: NOT DETECTED

## 2022-08-13 PROCEDURE — U0004 COV-19 TEST NON-CDC HGH THRU: HCPCS

## 2022-08-13 PROCEDURE — C9803 HOPD COVID-19 SPEC COLLECT: HCPCS

## 2022-08-15 ENCOUNTER — TELEPHONE (OUTPATIENT)
Dept: SURGERY | Facility: CLINIC | Age: 62
End: 2022-08-15

## 2022-08-15 DIAGNOSIS — K57.32 DIVERTICULITIS OF SIGMOID COLON: Primary | ICD-10-CM

## 2022-08-15 NOTE — TELEPHONE ENCOUNTER
Pt was exposed to covid & her dad tested positive on Friday.  She did a  Test sat which was negative.  She sounds congested & nasally?  Should she re-sched or come in?

## 2022-08-15 NOTE — TELEPHONE ENCOUNTER
Infectious control cancelled her sx.  They want her to re-test on 8/11 can you put in an order for the covid test?

## 2022-08-15 NOTE — TELEPHONE ENCOUNTER
Caller: ERICA MONTILLA    Relationship: SELF     Best call back number: [phone]    What is the best time to reach you: ASAP    Who are you requesting to speak with (clinical staff, provider,  specific staff member): SURGERY SCHEDULER    What was the call regarding: PT IS REQUESTING TO SPEAK TO SURGERY SCHEDULER IN REGARDS TO PROCEDURE SCHEDULED FOR TOMORROW W/BEAR.    Do you require a callback: YES

## 2022-08-18 ENCOUNTER — LAB (OUTPATIENT)
Dept: LAB | Facility: HOSPITAL | Age: 62
End: 2022-08-18

## 2022-08-18 ENCOUNTER — TELEPHONE (OUTPATIENT)
Dept: SURGERY | Facility: CLINIC | Age: 62
End: 2022-08-18

## 2022-08-18 DIAGNOSIS — K57.32 DIVERTICULITIS OF SIGMOID COLON: ICD-10-CM

## 2022-08-18 LAB — SARS-COV-2 ORF1AB RESP QL NAA+PROBE: DETECTED

## 2022-08-18 PROCEDURE — U0004 COV-19 TEST NON-CDC HGH THRU: HCPCS

## 2022-08-18 PROCEDURE — C9803 HOPD COVID-19 SPEC COLLECT: HCPCS

## 2022-08-18 NOTE — TELEPHONE ENCOUNTER
Provider: DANILO SIFUENTES    Caller: ERICA MONTILLA    Phone Number: [phone]    Reason for Call: PT TOOK COVID TEST THIS MORNING AND IS CALLING BACK TO RESCHEDULE HER SURGERY.

## 2022-08-22 NOTE — TELEPHONE ENCOUNTER
HEBER patient in office for EGD/CS. Scheduled at [facility] 08/15/2022 with arrival time 7:30am. Prep packet handed to patient. Also advised arrival time may change based on [facility] guidelines. Referral faxed to [facility]    
PAST MEDICAL HISTORY:  Atrial fibrillation, unspecified type Unspecified Atrial arrhthymia, S/p ablation on Bblocker    Dyslipidemia     Hypertension     Small bowel obstruction

## 2022-08-24 RX ORDER — LEVOTHYROXINE SODIUM 75 UG/1
TABLET ORAL
Qty: 30 TABLET | Refills: 1 | Status: SHIPPED | OUTPATIENT
Start: 2022-08-24 | End: 2022-10-28

## 2022-08-26 ENCOUNTER — TELEPHONE (OUTPATIENT)
Dept: SURGERY | Facility: CLINIC | Age: 62
End: 2022-08-26

## 2022-08-26 RX ORDER — LEVOFLOXACIN 500 MG/1
500 TABLET, FILM COATED ORAL DAILY
Qty: 10 TABLET | Refills: 0 | Status: SHIPPED | OUTPATIENT
Start: 2022-08-26 | End: 2022-09-08 | Stop reason: HOSPADM

## 2022-08-26 RX ORDER — METRONIDAZOLE 500 MG/1
500 TABLET ORAL 3 TIMES DAILY
Qty: 30 TABLET | Refills: 0 | Status: SHIPPED | OUTPATIENT
Start: 2022-08-26 | End: 2022-09-08 | Stop reason: HOSPADM

## 2022-08-26 NOTE — TELEPHONE ENCOUNTER
Patient called requesting refill of Flagyl & Levaquin for flare up of diverticulitis. She is scheduled for surgery on 9/6.

## 2022-08-29 NOTE — TELEPHONE ENCOUNTER
I spoke with the patient and she picked up her antibiotics and started them on Saturday. The Levaquin and Flagyl are prescribed through 9/5 and she is also to take E-mycin & Neomycin on 9/5. She is wanting to know if she takes the Levaquin & Flagyl to completion or skip the dose on 9/5 and only take the E-mycin & Neomycin?

## 2022-08-29 NOTE — TELEPHONE ENCOUNTER
Chris Montano MD  to Me          12:39 PM   Probably just as well to skip the doses on that day before the surgery     Patient advised to skip dose and only take E-mycin & Neomycin on 9/5. Patient voiced understanding.

## 2022-09-06 ENCOUNTER — ANESTHESIA (OUTPATIENT)
Dept: PERIOP | Facility: HOSPITAL | Age: 62
End: 2022-09-06

## 2022-09-06 ENCOUNTER — ANESTHESIA EVENT (OUTPATIENT)
Dept: PERIOP | Facility: HOSPITAL | Age: 62
End: 2022-09-06

## 2022-09-06 ENCOUNTER — HOSPITAL ENCOUNTER (INPATIENT)
Facility: HOSPITAL | Age: 62
LOS: 2 days | Discharge: HOME | End: 2022-09-08
Attending: SURGERY | Admitting: SURGERY

## 2022-09-06 DIAGNOSIS — K57.32 DIVERTICULITIS OF LARGE INTESTINE WITHOUT PERFORATION OR ABSCESS WITHOUT BLEEDING: Primary | ICD-10-CM

## 2022-09-06 DIAGNOSIS — K57.32 DIVERTICULITIS OF SIGMOID COLON: ICD-10-CM

## 2022-09-06 LAB — GLUCOSE BLDC GLUCOMTR-MCNC: 94 MG/DL (ref 70–130)

## 2022-09-06 PROCEDURE — 25010000002 FENTANYL CITRATE (PF) 50 MCG/ML SOLUTION: Performed by: NURSE ANESTHETIST, CERTIFIED REGISTERED

## 2022-09-06 PROCEDURE — 25010000002 PROPOFOL 10 MG/ML EMULSION: Performed by: NURSE ANESTHETIST, CERTIFIED REGISTERED

## 2022-09-06 PROCEDURE — 25010000002 PHENYLEPHRINE 10 MG/ML SOLUTION: Performed by: NURSE ANESTHETIST, CERTIFIED REGISTERED

## 2022-09-06 PROCEDURE — 0 BUPIVACAINE LIPOSOME 1.3 % SUSPENSION 20 ML VIAL: Performed by: SURGERY

## 2022-09-06 PROCEDURE — C9290 INJ, BUPIVACAINE LIPOSOME: HCPCS | Performed by: SURGERY

## 2022-09-06 PROCEDURE — 25010000002 HYDROMORPHONE PER 4 MG: Performed by: NURSE ANESTHETIST, CERTIFIED REGISTERED

## 2022-09-06 PROCEDURE — 25010000002 MIDAZOLAM PER 1 MG: Performed by: ANESTHESIOLOGY

## 2022-09-06 PROCEDURE — 44207 L COLECTOMY/COLOPROCTOSTOMY: CPT | Performed by: SPECIALIST/TECHNOLOGIST, OTHER

## 2022-09-06 PROCEDURE — 82962 GLUCOSE BLOOD TEST: CPT

## 2022-09-06 PROCEDURE — 0DBP4ZZ EXCISION OF RECTUM, PERCUTANEOUS ENDOSCOPIC APPROACH: ICD-10-PCS | Performed by: SURGERY

## 2022-09-06 PROCEDURE — 0DBM4ZZ EXCISION OF DESCENDING COLON, PERCUTANEOUS ENDOSCOPIC APPROACH: ICD-10-PCS | Performed by: SURGERY

## 2022-09-06 PROCEDURE — 25010000002 NEOSTIGMINE 5 MG/10ML SOLUTION: Performed by: ANESTHESIOLOGY

## 2022-09-06 PROCEDURE — 0DTN4ZZ RESECTION OF SIGMOID COLON, PERCUTANEOUS ENDOSCOPIC APPROACH: ICD-10-PCS | Performed by: SURGERY

## 2022-09-06 PROCEDURE — 25010000002 ONDANSETRON PER 1 MG: Performed by: NURSE ANESTHETIST, CERTIFIED REGISTERED

## 2022-09-06 PROCEDURE — 25010000002 MAGNESIUM SULFATE PER 500 MG OF MAGNESIUM: Performed by: NURSE ANESTHETIST, CERTIFIED REGISTERED

## 2022-09-06 PROCEDURE — 25010000002 LEVOFLOXACIN PER 250 MG: Performed by: SURGERY

## 2022-09-06 PROCEDURE — S0260 H&P FOR SURGERY: HCPCS | Performed by: SURGERY

## 2022-09-06 PROCEDURE — 44213 LAP MOBIL SPLENIC FL ADD-ON: CPT | Performed by: SURGERY

## 2022-09-06 PROCEDURE — 44213 LAP MOBIL SPLENIC FL ADD-ON: CPT | Performed by: SPECIALIST/TECHNOLOGIST, OTHER

## 2022-09-06 PROCEDURE — 44207 L COLECTOMY/COLOPROCTOSTOMY: CPT | Performed by: SURGERY

## 2022-09-06 PROCEDURE — 88307 TISSUE EXAM BY PATHOLOGIST: CPT | Performed by: SURGERY

## 2022-09-06 DEVICE — ENDOSCOPIC LINEAR CUTTER RELOADS GRAY 2.0MM, 6 ROWS
Type: IMPLANTABLE DEVICE | Site: ABDOMEN | Status: FUNCTIONAL
Brand: ECHELON ENDOPATH

## 2022-09-06 DEVICE — CELLULAR STAPLER WITH TRI-STAPLE TECHNOLOGY
Type: IMPLANTABLE DEVICE | Site: ABDOMEN | Status: FUNCTIONAL
Brand: EEA

## 2022-09-06 DEVICE — SEALANT WND FIBRIN TISSEEL PREFIL/SYR/PRIMAFZ 10ML: Type: IMPLANTABLE DEVICE | Site: ABDOMEN | Status: FUNCTIONAL

## 2022-09-06 DEVICE — ENDOPATH ECHELON ENDOSCOPIC LINEAR CUTTER RELOADS, BLUE, 60MM
Type: IMPLANTABLE DEVICE | Site: ABDOMEN | Status: FUNCTIONAL
Brand: ECHELON ENDOPATH

## 2022-09-06 DEVICE — CLIP LIGAT VASC HORIZON TI MD/LG GRN 6CT: Type: IMPLANTABLE DEVICE | Site: ABDOMEN | Status: FUNCTIONAL

## 2022-09-06 RX ORDER — PROMETHAZINE HYDROCHLORIDE 25 MG/1
25 SUPPOSITORY RECTAL ONCE AS NEEDED
Status: DISCONTINUED | OUTPATIENT
Start: 2022-09-06 | End: 2022-09-06 | Stop reason: HOSPADM

## 2022-09-06 RX ORDER — FENTANYL CITRATE 50 UG/ML
INJECTION, SOLUTION INTRAMUSCULAR; INTRAVENOUS AS NEEDED
Status: DISCONTINUED | OUTPATIENT
Start: 2022-09-06 | End: 2022-09-06 | Stop reason: SURG

## 2022-09-06 RX ORDER — ALLOPURINOL 100 MG/1
100 TABLET ORAL NIGHTLY
Status: DISCONTINUED | OUTPATIENT
Start: 2022-09-06 | End: 2022-09-08 | Stop reason: HOSPADM

## 2022-09-06 RX ORDER — DIPHENHYDRAMINE HYDROCHLORIDE 50 MG/ML
12.5 INJECTION INTRAMUSCULAR; INTRAVENOUS
Status: DISCONTINUED | OUTPATIENT
Start: 2022-09-06 | End: 2022-09-06 | Stop reason: HOSPADM

## 2022-09-06 RX ORDER — MIDAZOLAM HYDROCHLORIDE 1 MG/ML
1 INJECTION INTRAMUSCULAR; INTRAVENOUS
Status: COMPLETED | OUTPATIENT
Start: 2022-09-06 | End: 2022-09-06

## 2022-09-06 RX ORDER — BUPIVACAINE HYDROCHLORIDE AND EPINEPHRINE 5; 5 MG/ML; UG/ML
INJECTION, SOLUTION EPIDURAL; INTRACAUDAL; PERINEURAL AS NEEDED
Status: DISCONTINUED | OUTPATIENT
Start: 2022-09-06 | End: 2022-09-06 | Stop reason: HOSPADM

## 2022-09-06 RX ORDER — INSULIN LISPRO 100 [IU]/ML
0-9 INJECTION, SOLUTION INTRAVENOUS; SUBCUTANEOUS
Status: DISCONTINUED | OUTPATIENT
Start: 2022-09-06 | End: 2022-09-08 | Stop reason: HOSPADM

## 2022-09-06 RX ORDER — LIDOCAINE HYDROCHLORIDE 40 MG/ML
SOLUTION TOPICAL AS NEEDED
Status: DISCONTINUED | OUTPATIENT
Start: 2022-09-06 | End: 2022-09-06 | Stop reason: SURG

## 2022-09-06 RX ORDER — NALOXONE HCL 0.4 MG/ML
0.2 VIAL (ML) INJECTION AS NEEDED
Status: DISCONTINUED | OUTPATIENT
Start: 2022-09-06 | End: 2022-09-06 | Stop reason: HOSPADM

## 2022-09-06 RX ORDER — SODIUM CHLORIDE 0.9 % (FLUSH) 0.9 %
3 SYRINGE (ML) INJECTION EVERY 12 HOURS SCHEDULED
Status: DISCONTINUED | OUTPATIENT
Start: 2022-09-06 | End: 2022-09-06 | Stop reason: HOSPADM

## 2022-09-06 RX ORDER — HYDROMORPHONE HYDROCHLORIDE 1 MG/ML
0.5 INJECTION, SOLUTION INTRAMUSCULAR; INTRAVENOUS; SUBCUTANEOUS
Status: DISCONTINUED | OUTPATIENT
Start: 2022-09-06 | End: 2022-09-08

## 2022-09-06 RX ORDER — PROMETHAZINE HYDROCHLORIDE 25 MG/1
25 TABLET ORAL ONCE AS NEEDED
Status: DISCONTINUED | OUTPATIENT
Start: 2022-09-06 | End: 2022-09-06 | Stop reason: HOSPADM

## 2022-09-06 RX ORDER — METRONIDAZOLE 500 MG/100ML
500 INJECTION, SOLUTION INTRAVENOUS EVERY 8 HOURS
Status: COMPLETED | OUTPATIENT
Start: 2022-09-06 | End: 2022-09-07

## 2022-09-06 RX ORDER — KETAMINE HYDROCHLORIDE 10 MG/ML
INJECTION INTRAMUSCULAR; INTRAVENOUS AS NEEDED
Status: DISCONTINUED | OUTPATIENT
Start: 2022-09-06 | End: 2022-09-06 | Stop reason: SURG

## 2022-09-06 RX ORDER — LABETALOL HYDROCHLORIDE 5 MG/ML
5 INJECTION, SOLUTION INTRAVENOUS
Status: DISCONTINUED | OUTPATIENT
Start: 2022-09-06 | End: 2022-09-06 | Stop reason: HOSPADM

## 2022-09-06 RX ORDER — HYDROCODONE BITARTRATE AND ACETAMINOPHEN 7.5; 325 MG/1; MG/1
1 TABLET ORAL ONCE AS NEEDED
Status: DISCONTINUED | OUTPATIENT
Start: 2022-09-06 | End: 2022-09-06 | Stop reason: HOSPADM

## 2022-09-06 RX ORDER — SODIUM CHLORIDE 9 MG/ML
INJECTION, SOLUTION INTRAVENOUS AS NEEDED
Status: DISCONTINUED | OUTPATIENT
Start: 2022-09-06 | End: 2022-09-06 | Stop reason: HOSPADM

## 2022-09-06 RX ORDER — FENTANYL CITRATE 50 UG/ML
50 INJECTION, SOLUTION INTRAMUSCULAR; INTRAVENOUS
Status: DISCONTINUED | OUTPATIENT
Start: 2022-09-06 | End: 2022-09-06 | Stop reason: HOSPADM

## 2022-09-06 RX ORDER — OXYCODONE AND ACETAMINOPHEN 7.5; 325 MG/1; MG/1
1 TABLET ORAL EVERY 4 HOURS PRN
Status: DISCONTINUED | OUTPATIENT
Start: 2022-09-06 | End: 2022-09-06 | Stop reason: HOSPADM

## 2022-09-06 RX ORDER — NALOXONE HCL 0.4 MG/ML
0.1 VIAL (ML) INJECTION
Status: DISCONTINUED | OUTPATIENT
Start: 2022-09-06 | End: 2022-09-08 | Stop reason: HOSPADM

## 2022-09-06 RX ORDER — ENOXAPARIN SODIUM 100 MG/ML
40 INJECTION SUBCUTANEOUS DAILY
Status: DISCONTINUED | OUTPATIENT
Start: 2022-09-07 | End: 2022-09-08 | Stop reason: HOSPADM

## 2022-09-06 RX ORDER — ONDANSETRON 2 MG/ML
4 INJECTION INTRAMUSCULAR; INTRAVENOUS EVERY 6 HOURS PRN
Status: DISCONTINUED | OUTPATIENT
Start: 2022-09-06 | End: 2022-09-08 | Stop reason: HOSPADM

## 2022-09-06 RX ORDER — LEVOFLOXACIN 5 MG/ML
500 INJECTION, SOLUTION INTRAVENOUS ONCE
Status: COMPLETED | OUTPATIENT
Start: 2022-09-06 | End: 2022-09-06

## 2022-09-06 RX ORDER — FLUMAZENIL 0.1 MG/ML
0.2 INJECTION INTRAVENOUS AS NEEDED
Status: DISCONTINUED | OUTPATIENT
Start: 2022-09-06 | End: 2022-09-06 | Stop reason: HOSPADM

## 2022-09-06 RX ORDER — DEXTROSE MONOHYDRATE 25 G/50ML
25 INJECTION, SOLUTION INTRAVENOUS
Status: DISCONTINUED | OUTPATIENT
Start: 2022-09-06 | End: 2022-09-08 | Stop reason: HOSPADM

## 2022-09-06 RX ORDER — GLYCOPYRROLATE 0.2 MG/ML
INJECTION INTRAMUSCULAR; INTRAVENOUS AS NEEDED
Status: DISCONTINUED | OUTPATIENT
Start: 2022-09-06 | End: 2022-09-06 | Stop reason: SURG

## 2022-09-06 RX ORDER — FAMOTIDINE 10 MG/ML
20 INJECTION, SOLUTION INTRAVENOUS ONCE
Status: COMPLETED | OUTPATIENT
Start: 2022-09-06 | End: 2022-09-06

## 2022-09-06 RX ORDER — SODIUM CHLORIDE 9 MG/ML
75 INJECTION, SOLUTION INTRAVENOUS CONTINUOUS
Status: DISCONTINUED | OUTPATIENT
Start: 2022-09-06 | End: 2022-09-07

## 2022-09-06 RX ORDER — MAGNESIUM SULFATE HEPTAHYDRATE 500 MG/ML
INJECTION, SOLUTION INTRAMUSCULAR; INTRAVENOUS AS NEEDED
Status: DISCONTINUED | OUTPATIENT
Start: 2022-09-06 | End: 2022-09-06 | Stop reason: SURG

## 2022-09-06 RX ORDER — SODIUM CHLORIDE 0.9 % (FLUSH) 0.9 %
3-10 SYRINGE (ML) INJECTION AS NEEDED
Status: DISCONTINUED | OUTPATIENT
Start: 2022-09-06 | End: 2022-09-06 | Stop reason: HOSPADM

## 2022-09-06 RX ORDER — DIPHENHYDRAMINE HCL 25 MG
25 CAPSULE ORAL
Status: DISCONTINUED | OUTPATIENT
Start: 2022-09-06 | End: 2022-09-06 | Stop reason: HOSPADM

## 2022-09-06 RX ORDER — ONDANSETRON 2 MG/ML
4 INJECTION INTRAMUSCULAR; INTRAVENOUS ONCE AS NEEDED
Status: DISCONTINUED | OUTPATIENT
Start: 2022-09-06 | End: 2022-09-06 | Stop reason: HOSPADM

## 2022-09-06 RX ORDER — EPHEDRINE SULFATE 50 MG/ML
5 INJECTION, SOLUTION INTRAVENOUS ONCE AS NEEDED
Status: DISCONTINUED | OUTPATIENT
Start: 2022-09-06 | End: 2022-09-06 | Stop reason: HOSPADM

## 2022-09-06 RX ORDER — ALVIMOPAN 12 MG/1
12 CAPSULE ORAL 2 TIMES DAILY
Status: DISCONTINUED | OUTPATIENT
Start: 2022-09-06 | End: 2022-09-08

## 2022-09-06 RX ORDER — HYDROCODONE BITARTRATE AND ACETAMINOPHEN 5; 325 MG/1; MG/1
1 TABLET ORAL EVERY 4 HOURS PRN
Status: DISCONTINUED | OUTPATIENT
Start: 2022-09-06 | End: 2022-09-08 | Stop reason: HOSPADM

## 2022-09-06 RX ORDER — PHENYLEPHRINE HYDROCHLORIDE 10 MG/ML
INJECTION INTRAVENOUS AS NEEDED
Status: DISCONTINUED | OUTPATIENT
Start: 2022-09-06 | End: 2022-09-06 | Stop reason: SURG

## 2022-09-06 RX ORDER — HYDRALAZINE HYDROCHLORIDE 20 MG/ML
5 INJECTION INTRAMUSCULAR; INTRAVENOUS
Status: DISCONTINUED | OUTPATIENT
Start: 2022-09-06 | End: 2022-09-06 | Stop reason: HOSPADM

## 2022-09-06 RX ORDER — HYDROMORPHONE HYDROCHLORIDE 1 MG/ML
0.5 INJECTION, SOLUTION INTRAMUSCULAR; INTRAVENOUS; SUBCUTANEOUS
Status: DISCONTINUED | OUTPATIENT
Start: 2022-09-06 | End: 2022-09-06 | Stop reason: HOSPADM

## 2022-09-06 RX ORDER — LIDOCAINE HYDROCHLORIDE 20 MG/ML
INJECTION, SOLUTION INFILTRATION; PERINEURAL AS NEEDED
Status: DISCONTINUED | OUTPATIENT
Start: 2022-09-06 | End: 2022-09-06 | Stop reason: SURG

## 2022-09-06 RX ORDER — SCOLOPAMINE TRANSDERMAL SYSTEM 1 MG/1
1 PATCH, EXTENDED RELEASE TRANSDERMAL
Status: DISCONTINUED | OUTPATIENT
Start: 2022-09-06 | End: 2022-09-08 | Stop reason: HOSPADM

## 2022-09-06 RX ORDER — LEVOTHYROXINE SODIUM 0.07 MG/1
75 TABLET ORAL
Status: DISCONTINUED | OUTPATIENT
Start: 2022-09-07 | End: 2022-09-08 | Stop reason: HOSPADM

## 2022-09-06 RX ORDER — CEFAZOLIN SODIUM 2 G/100ML
2 INJECTION, SOLUTION INTRAVENOUS ONCE
Status: DISCONTINUED | OUTPATIENT
Start: 2022-09-06 | End: 2022-09-06

## 2022-09-06 RX ORDER — SODIUM CHLORIDE, SODIUM LACTATE, POTASSIUM CHLORIDE, CALCIUM CHLORIDE 600; 310; 30; 20 MG/100ML; MG/100ML; MG/100ML; MG/100ML
9 INJECTION, SOLUTION INTRAVENOUS CONTINUOUS
Status: DISCONTINUED | OUTPATIENT
Start: 2022-09-06 | End: 2022-09-06

## 2022-09-06 RX ORDER — ONDANSETRON 2 MG/ML
INJECTION INTRAMUSCULAR; INTRAVENOUS AS NEEDED
Status: DISCONTINUED | OUTPATIENT
Start: 2022-09-06 | End: 2022-09-06 | Stop reason: SURG

## 2022-09-06 RX ORDER — ROCURONIUM BROMIDE 10 MG/ML
INJECTION, SOLUTION INTRAVENOUS AS NEEDED
Status: DISCONTINUED | OUTPATIENT
Start: 2022-09-06 | End: 2022-09-06 | Stop reason: SURG

## 2022-09-06 RX ORDER — PANTOPRAZOLE SODIUM 40 MG/1
40 TABLET, DELAYED RELEASE ORAL DAILY
Status: DISCONTINUED | OUTPATIENT
Start: 2022-09-06 | End: 2022-09-08 | Stop reason: HOSPADM

## 2022-09-06 RX ORDER — HYDROCODONE BITARTRATE AND ACETAMINOPHEN 5; 325 MG/1; MG/1
2 TABLET ORAL EVERY 4 HOURS PRN
Status: DISCONTINUED | OUTPATIENT
Start: 2022-09-06 | End: 2022-09-08 | Stop reason: HOSPADM

## 2022-09-06 RX ORDER — METRONIDAZOLE 500 MG/100ML
500 INJECTION, SOLUTION INTRAVENOUS ONCE
Status: COMPLETED | OUTPATIENT
Start: 2022-09-06 | End: 2022-09-06

## 2022-09-06 RX ORDER — MAGNESIUM HYDROXIDE 1200 MG/15ML
LIQUID ORAL AS NEEDED
Status: DISCONTINUED | OUTPATIENT
Start: 2022-09-06 | End: 2022-09-06 | Stop reason: HOSPADM

## 2022-09-06 RX ORDER — LIDOCAINE HYDROCHLORIDE 10 MG/ML
0.5 INJECTION, SOLUTION EPIDURAL; INFILTRATION; INTRACAUDAL; PERINEURAL ONCE AS NEEDED
Status: DISCONTINUED | OUTPATIENT
Start: 2022-09-06 | End: 2022-09-06 | Stop reason: HOSPADM

## 2022-09-06 RX ORDER — PROPOFOL 10 MG/ML
VIAL (ML) INTRAVENOUS AS NEEDED
Status: DISCONTINUED | OUTPATIENT
Start: 2022-09-06 | End: 2022-09-06 | Stop reason: SURG

## 2022-09-06 RX ORDER — NICOTINE POLACRILEX 4 MG
15 LOZENGE BUCCAL
Status: DISCONTINUED | OUTPATIENT
Start: 2022-09-06 | End: 2022-09-08 | Stop reason: HOSPADM

## 2022-09-06 RX ORDER — NEOSTIGMINE METHYLSULFATE 0.5 MG/ML
INJECTION, SOLUTION INTRAVENOUS AS NEEDED
Status: DISCONTINUED | OUTPATIENT
Start: 2022-09-06 | End: 2022-09-06 | Stop reason: SURG

## 2022-09-06 RX ADMIN — LIDOCAINE HYDROCHLORIDE 100 MG: 20 INJECTION, SOLUTION INFILTRATION; PERINEURAL at 13:11

## 2022-09-06 RX ADMIN — PHENYLEPHRINE HYDROCHLORIDE 200 MCG: 10 INJECTION, SOLUTION INTRAVENOUS at 13:24

## 2022-09-06 RX ADMIN — PANTOPRAZOLE SODIUM 40 MG: 40 TABLET, DELAYED RELEASE ORAL at 20:23

## 2022-09-06 RX ADMIN — MIDAZOLAM HYDROCHLORIDE 1 MG: 1 INJECTION, SOLUTION INTRAMUSCULAR; INTRAVENOUS at 11:27

## 2022-09-06 RX ADMIN — GLYCOPYRROLATE 0.2 MG: 0.2 INJECTION INTRAMUSCULAR; INTRAVENOUS at 13:34

## 2022-09-06 RX ADMIN — ROCURONIUM BROMIDE 30 MG: 50 INJECTION INTRAVENOUS at 13:58

## 2022-09-06 RX ADMIN — HYDROCODONE BITARTRATE AND ACETAMINOPHEN 2 TABLET: 5; 325 TABLET ORAL at 20:23

## 2022-09-06 RX ADMIN — FAMOTIDINE 20 MG: 10 INJECTION, SOLUTION INTRAVENOUS at 10:14

## 2022-09-06 RX ADMIN — MIDAZOLAM HYDROCHLORIDE 1 MG: 1 INJECTION, SOLUTION INTRAMUSCULAR; INTRAVENOUS at 11:08

## 2022-09-06 RX ADMIN — PROPOFOL 25 MCG/KG/MIN: 10 INJECTION, EMULSION INTRAVENOUS at 13:25

## 2022-09-06 RX ADMIN — KETAMINE HYDROCHLORIDE 10 MG: 10 INJECTION INTRAMUSCULAR; INTRAVENOUS at 14:47

## 2022-09-06 RX ADMIN — HYDROMORPHONE HYDROCHLORIDE 0.5 MG: 1 INJECTION, SOLUTION INTRAMUSCULAR; INTRAVENOUS; SUBCUTANEOUS at 17:02

## 2022-09-06 RX ADMIN — ALLOPURINOL 100 MG: 100 TABLET ORAL at 20:22

## 2022-09-06 RX ADMIN — KETAMINE HYDROCHLORIDE 10 MG: 10 INJECTION INTRAMUSCULAR; INTRAVENOUS at 15:42

## 2022-09-06 RX ADMIN — FENTANYL CITRATE 50 MCG: 50 INJECTION INTRAMUSCULAR; INTRAVENOUS at 13:11

## 2022-09-06 RX ADMIN — SCOPALAMINE 1 PATCH: 1 PATCH, EXTENDED RELEASE TRANSDERMAL at 10:14

## 2022-09-06 RX ADMIN — ROCURONIUM BROMIDE 10 MG: 50 INJECTION INTRAVENOUS at 16:00

## 2022-09-06 RX ADMIN — LEVOFLOXACIN 500 MG: 500 INJECTION, SOLUTION INTRAVENOUS at 12:56

## 2022-09-06 RX ADMIN — METRONIDAZOLE 500 MG: 500 INJECTION, SOLUTION INTRAVENOUS at 20:23

## 2022-09-06 RX ADMIN — MAGNESIUM SULFATE HEPTAHYDRATE 1 G: 500 INJECTION, SOLUTION INTRAMUSCULAR; INTRAVENOUS at 13:21

## 2022-09-06 RX ADMIN — ALVIMOPAN 12 MG: 12 CAPSULE ORAL at 10:07

## 2022-09-06 RX ADMIN — GLYCOPYRROLATE 0.3 MG: 0.2 INJECTION INTRAMUSCULAR; INTRAVENOUS at 16:34

## 2022-09-06 RX ADMIN — HYDROMORPHONE HYDROCHLORIDE 0.5 MG: 1 INJECTION, SOLUTION INTRAMUSCULAR; INTRAVENOUS; SUBCUTANEOUS at 17:55

## 2022-09-06 RX ADMIN — LIDOCAINE HYDROCHLORIDE 1 EACH: 40 SOLUTION TOPICAL at 13:17

## 2022-09-06 RX ADMIN — SERTRALINE HYDROCHLORIDE 50 MG: 50 TABLET, FILM COATED ORAL at 20:22

## 2022-09-06 RX ADMIN — SODIUM CHLORIDE, POTASSIUM CHLORIDE, SODIUM LACTATE AND CALCIUM CHLORIDE 9 ML/HR: 600; 310; 30; 20 INJECTION, SOLUTION INTRAVENOUS at 10:07

## 2022-09-06 RX ADMIN — FENTANYL CITRATE 50 MCG: 50 INJECTION INTRAMUSCULAR; INTRAVENOUS at 17:00

## 2022-09-06 RX ADMIN — ONDANSETRON 4 MG: 2 INJECTION INTRAMUSCULAR; INTRAVENOUS at 14:46

## 2022-09-06 RX ADMIN — ALVIMOPAN 12 MG: 12 CAPSULE ORAL at 20:22

## 2022-09-06 RX ADMIN — PROPOFOL 150 MG: 10 INJECTION, EMULSION INTRAVENOUS at 13:13

## 2022-09-06 RX ADMIN — METRONIDAZOLE 500 MG: 500 INJECTION, SOLUTION INTRAVENOUS at 12:54

## 2022-09-06 RX ADMIN — NEOSTIGMINE METHYLSULFATE 3 MG: 0.5 INJECTION INTRAVENOUS at 16:32

## 2022-09-06 RX ADMIN — KETAMINE HYDROCHLORIDE 30 MG: 10 INJECTION INTRAMUSCULAR; INTRAVENOUS at 13:31

## 2022-09-06 RX ADMIN — MAGNESIUM SULFATE HEPTAHYDRATE 1 G: 500 INJECTION, SOLUTION INTRAMUSCULAR; INTRAVENOUS at 13:19

## 2022-09-06 RX ADMIN — ROCURONIUM BROMIDE 40 MG: 50 INJECTION INTRAVENOUS at 13:13

## 2022-09-06 RX ADMIN — FENTANYL CITRATE 50 MCG: 50 INJECTION INTRAMUSCULAR; INTRAVENOUS at 14:17

## 2022-09-06 RX ADMIN — PROPOFOL 25 MCG/KG/MIN: 10 INJECTION, EMULSION INTRAVENOUS at 15:29

## 2022-09-06 NOTE — ANESTHESIA POSTPROCEDURE EVALUATION
"Patient: Laura Cerda    Procedure Summary     Date: 09/06/22 Room / Location: [facility] OR [address] [place] [address] MAIN OR    Anesthesia Start: 1305 Anesthesia Stop: 1651    Procedure: LAPAROSCOPIC LOW ANTERIOR COLON RESECTION (N/A Abdomen) Diagnosis:       Diverticulitis of sigmoid colon      (Diverticulitis of sigmoid colon [K57.32])    Surgeons: Chris Montano MD Provider: Mario Cullen MD    Anesthesia Type: general ASA Status: 3          Anesthesia Type: general    Vitals  Vitals Value Taken Time   /90 09/06/22 1801   Temp 36.6 °C (97.8 °F) 09/06/22 1649   Pulse 87 09/06/22 1816   Resp 14 09/06/22 1745   SpO2 99 % 09/06/22 1817   Vitals shown include unvalidated device data.        Post Anesthesia Care and Evaluation    Pain management: adequate    Airway patency: patent  Anesthetic complications: No anesthetic complications    Cardiovascular status: acceptable  Respiratory status: acceptable  Hydration status: acceptable    Comments: /80 (Patient Position: Lying)   Pulse 87   Temp 36.1 °C (97 °F) (Oral)   Resp 16   Ht 157.5 cm (62\")   Wt 111 kg (243 lb 11.2 oz)   SpO2 100%   BMI 44.57 kg/m²         "

## 2022-09-06 NOTE — OP NOTE
PREOPERATIVE DIAGNOSIS:  Chronic recurrent diverticulitis    POSTOPERATIVE DIAGNOSIS (FINDINGS):  Same    PROCEDURE:  Laparoscopic low anterior resection with mobilization of splenic flexure    SURGEON:  Chris Montano MD    ASSISTANT:  Tisha Chacon, was responsible for performing the following activities: suction, irrigation, suturing, closing, retraction, camera holding, and placing dressing, and their skilled assistance was necessary for the success of this case.    ANESTHESIA:  General    EBL:  Minimal    SPECIMEN(S):  Portion of descending colon, sigmoid colon, and portion of rectum    DESCRIPTION:  In supine position under general anesthetic prepped and draped usual sterile manner.  All incisions infiltrated with half percent Marcaine with epinephrine mixed with Exparel.  Small incision made above the umbilicus and Veress needle inserted with upper traction on the abdominal wall.  Abdomen insufflated 15 mmHg and a 5 mm Optiview trocar inserted followed by right lower quadrant 12, right midabdomen 5, and left lower quadrant 5 mm trocar.  The inferior mesenteric vein was identified, clipped proximally and distally, and divided with the harmonic scalpel.  The splenic flexure was mobilized completely with the harmonic scalpel.  Descending colon and sigmoid colon were mobilized with harmonic scalpel and the left ureter was identified and carefully avoided throughout the procedure.  The inferior mesenteric artery was isolated and divided with the [place] vascular loaded stapler and the remaining retroperitoneal attachments of the left colon were divided with the harmonic scalpel.  Peritoneum was opened on either side of the rectum and the rectum was skeletonized just past the rectosigmoid junction and divided with the blue loaded stapler.  The left lower quadrant incision was extended and a medium wound protector was inserted and the bowel was exteriorized.  A point of transection on the mid descending colon was  chosen proximal to any evident prior disease and good strong Doppler arterial flow was confirmed within the marginal vessel.  The remaining mesentery was divided with the harmonic scalpel.  The colon was divided with electrocautery and a 28 mm Covidien anvil was inserted and pursestring sutured in with 2-0 Prolene.  The bowel was returned to the peritoneal cavity and the fascia was closed in 2 layers of running 0 PDS.  The rectal dilators were inserted to the rectal staple line.  The stapler was then inserted but during insertion the rectal wall anteriorly tore distal to the staple line.  We proceeded to dissect the rectum anteriorly, posteriorly, and bilaterally with the harmonic scalpel until we were distal to the point of disruption of the bowel wall and we skeletonized the rectum at that level and retransected it with the blue loaded stapler.  The EEA stapler was then inserted transrectally with no difficulty and the 2 ends were brought together and fired.  Airtight anastomosis was confirmed within a saline filled pelvis.  No tension was present on the anastomosis.  10 mL of Tisseel was applied around the anastomosis and a 19 Sami Vamshi drain was placed in the pelvis after ensuring good hemostasis.  The bowel was appropriately oriented and good hemostasis was noted throughout the abdomen.  CO2 was released.  Skin edges were closed with interrupted 3-0 nylon vertical mattress suture.  Sterile dressings applied.  Tolerated well, stable to recovery room.    Chris Montano M.D.

## 2022-09-06 NOTE — ANESTHESIA PROCEDURE NOTES
Airway  Urgency: elective    Date/Time: 9/6/2022 1:17 PM  Airway not difficult    General Information and Staff    Patient location during procedure: OR  Anesthesiologist: Jazmin Mims MD  CRNA/CAA: Jose Nettles CRNA    Indications and Patient Condition  Indications for airway management: airway protection    Preoxygenated: yes  Mask difficulty assessment: 1 - vent by mask    Final Airway Details  Final airway type: endotracheal airway      Successful airway: ETT  Cuffed: yes   Successful intubation technique: direct laryngoscopy  Endotracheal tube insertion site: oral  Blade: Gracia  Blade size: 2  ETT size (mm): 7.0  Cormack-Lehane Classification: grade I - full view of glottis  Placement verified by: chest auscultation and capnometry   Measured from: lips  ETT/EBT  to lips (cm): 22  Number of attempts at approach: 1  Assessment: lips, teeth, and gum same as pre-op and atraumatic intubation    Additional Comments  Pre 02 100%, SIVI, DL x1, atraumatic intubation, BLBS, Positive ETC02.

## 2022-09-06 NOTE — H&P
ASSESSMENT/PLAN:    62-year-old lady with chronic recurrent diverticulitis and persistent left lower quadrant pain following most recent episode.  Given the number of episodes she has had, the severity, and the persistence of her pain, she wishes to proceed with laparoscopic sigmoid colectomy.  She understands the nature of the procedure and the risks including but not limited to bleeding, infection, conversion open procedure, and postoperative anastomotic leak requiring reoperation and temporary ostomy.    CC:     Diverticulitis    HPI:    62-year-old lady with more than a decade history of chronic recurrent episodes of diverticulitis.  Most recent episode started in 2022 and although the acute left lower quadrant pain improved, she has had persistent low-grade left lower quadrant pain since that episode.    ENDOSCOPY:   • EGD 2018  • Colonoscopy 2018 demonstrated diverticulosis    RADIOLOGY:   · CT abdomen pelvis 2022: Findings of acute proximal sigmoid diverticulitis  · CT abdomen pelvis 2022: Near complete resolution of sigmoid diverticulitis  · CT abdomen pelvis 2022: Acute sigmoid diverticulitis with extensive subadjacent inflammation/phlegmon    SOCIAL HISTORY:   • Denies tobacco use  • Denies alcohol use    FAMILY HISTORY:    • Colorectal cancer: Paternal grandmother  • Diverticulitis requiring surgery: Mother    PREVIOUS ABDOMINAL SURGERY    •  section x2  • Laparoscopic cholecystectomy    PAST MEDICAL HISTORY:    · Chronic kidney disease  · Diabetes  · Diverticulitis  · Fibromyalgia  · Gastroesophageal reflux disease  · Hyperlipidemia  · Hypertension  · Hypothyroidism  · Hyperparathyroidism  · Ménière's disease  · Psoriatic arthritis    MEDICATIONS:   · Allopurinol  · Atorvastatin  · Allegra  · Lasix  · Relafen  · Zofran  · Protonix  · Actos  · Florastor  · Zoloft  · Zanaflex  · Unithyroid     ALLERGIES:    · Azithromycin-rash  · Cefaclor-rash  · [place]-swelling  · Metformin-GI intolerance  · Penicillin/Augmentin-hives and shortness of air    PHYSICAL EXAM:   • Constitutional: Well-developed well-nourished, no acute distress  • Respiratory: Normal nonlabored inspiratory effort  • Cardiovascular: Regular rate  • Gastrointestinal: Soft, nontender  • Psychiatric: Alert and oriented ×3, normal affect     ROS:  · Negative other than presenting complaints    DANILO SIFUENTES M.D.

## 2022-09-06 NOTE — ANESTHESIA PREPROCEDURE EVALUATION
Anesthesia Evaluation     Patient summary reviewed and Nursing notes reviewed   history of anesthetic complications:               Airway   Mallampati: II  TM distance: >3 FB  Neck ROM: full  Dental      Pulmonary - negative pulmonary ROS   Cardiovascular     ECG reviewed  Rhythm: regular  Rate: normal    (+) hypertension, hyperlipidemia,       Neuro/Psych- negative ROS  GI/Hepatic/Renal/Endo    (+) morbid obesity, GERD, PUD,  liver disease fatty liver disease, renal disease CRI, diabetes mellitus type 2, thyroid problem hypothyroidism    Musculoskeletal (-) negative ROS    Abdominal    Substance History - negative use     OB/GYN negative ob/gyn ROS         Other                        Anesthesia Plan    ASA 3     general     (I have reviewed the patient's history with the patient and the chart, including all pertinent laboratory results and imaging. I have explained the risks of anesthesia including but not limited to dental damage, corneal abrasion, nerve injury, MI, stroke, and death. Questions asked and answered. Anesthetic plan discussed with patient and team as indicated. Patient expressed understanding of the above.  )  intravenous induction     Anesthetic plan, risks, benefits, and alternatives have been provided, discussed and informed consent has been obtained with: patient.        CODE STATUS:

## 2022-09-06 NOTE — PLAN OF CARE
Goal Outcome Evaluation:  Plan of Care Reviewed With: patient        Progress: no change  Outcome Evaluation: received pt from PACU, vss, abdomen soft, lap sites intact with scant amt of staining on dressings, JPx1 with small amt of serosanguinous drainage, moore with clear yellow urine, ivf infusing as ordered, sipping on clear liquids, no emesis, order to monitor blood sugars before meals, scds on

## 2022-09-07 LAB
ANION GAP SERPL CALCULATED.3IONS-SCNC: 11.3 MMOL/L (ref 5–15)
BASOPHILS # BLD AUTO: 0.05 10*3/MM3 (ref 0–0.2)
BASOPHILS NFR BLD AUTO: 0.4 % (ref 0–1.5)
BUN SERPL-MCNC: 9 MG/DL (ref 8–23)
BUN/CREAT SERPL: 13.2 (ref 7–25)
CALCIUM SPEC-SCNC: 8.4 MG/DL (ref 8.6–10.5)
CHLORIDE SERPL-SCNC: 100 MMOL/L (ref 98–107)
CO2 SERPL-SCNC: 24.7 MMOL/L (ref 22–29)
CREAT SERPL-MCNC: 0.68 MG/DL (ref 0.57–1)
DEPRECATED RDW RBC AUTO: 46.6 FL (ref 37–54)
EGFRCR SERPLBLD CKD-EPI 2021: 98 ML/MIN/1.73
EOSINOPHIL # BLD AUTO: 0.01 10*3/MM3 (ref 0–0.4)
EOSINOPHIL NFR BLD AUTO: 0.1 % (ref 0.3–6.2)
ERYTHROCYTE [DISTWIDTH] IN BLOOD BY AUTOMATED COUNT: 15.3 % (ref 12.3–15.4)
GLUCOSE BLDC GLUCOMTR-MCNC: 107 MG/DL (ref 70–130)
GLUCOSE BLDC GLUCOMTR-MCNC: 108 MG/DL (ref 70–130)
GLUCOSE BLDC GLUCOMTR-MCNC: 112 MG/DL (ref 70–130)
GLUCOSE SERPL-MCNC: 118 MG/DL (ref 65–99)
HCT VFR BLD AUTO: 33.2 % (ref 34–46.6)
HGB BLD-MCNC: 10.8 G/DL (ref 12–15.9)
IMM GRANULOCYTES # BLD AUTO: 0.06 10*3/MM3 (ref 0–0.05)
IMM GRANULOCYTES NFR BLD AUTO: 0.5 % (ref 0–0.5)
LYMPHOCYTES # BLD AUTO: 1.32 10*3/MM3 (ref 0.7–3.1)
LYMPHOCYTES NFR BLD AUTO: 11.4 % (ref 19.6–45.3)
MCH RBC QN AUTO: 27.6 PG (ref 26.6–33)
MCHC RBC AUTO-ENTMCNC: 32.5 G/DL (ref 31.5–35.7)
MCV RBC AUTO: 84.9 FL (ref 79–97)
MONOCYTES # BLD AUTO: 1.16 10*3/MM3 (ref 0.1–0.9)
MONOCYTES NFR BLD AUTO: 10.1 % (ref 5–12)
NEUTROPHILS NFR BLD AUTO: 77.5 % (ref 42.7–76)
NEUTROPHILS NFR BLD AUTO: 8.93 10*3/MM3 (ref 1.7–7)
NRBC BLD AUTO-RTO: 0 /100 WBC (ref 0–0.2)
PLATELET # BLD AUTO: 215 10*3/MM3 (ref 140–450)
PMV BLD AUTO: 12 FL (ref 6–12)
POTASSIUM SERPL-SCNC: 4.2 MMOL/L (ref 3.5–5.2)
RBC # BLD AUTO: 3.91 10*6/MM3 (ref 3.77–5.28)
SODIUM SERPL-SCNC: 136 MMOL/L (ref 136–145)
WBC NRBC COR # BLD: 11.53 10*3/MM3 (ref 3.4–10.8)

## 2022-09-07 PROCEDURE — 80048 BASIC METABOLIC PNL TOTAL CA: CPT | Performed by: SURGERY

## 2022-09-07 PROCEDURE — 25010000002 ONDANSETRON PER 1 MG: Performed by: SURGERY

## 2022-09-07 PROCEDURE — 85025 COMPLETE CBC W/AUTO DIFF WBC: CPT | Performed by: SURGERY

## 2022-09-07 PROCEDURE — 25010000002 ENOXAPARIN PER 10 MG: Performed by: SURGERY

## 2022-09-07 PROCEDURE — 99024 POSTOP FOLLOW-UP VISIT: CPT | Performed by: SURGERY

## 2022-09-07 PROCEDURE — 82962 GLUCOSE BLOOD TEST: CPT

## 2022-09-07 RX ADMIN — ONDANSETRON 4 MG: 2 INJECTION INTRAMUSCULAR; INTRAVENOUS at 20:19

## 2022-09-07 RX ADMIN — SERTRALINE HYDROCHLORIDE 50 MG: 50 TABLET, FILM COATED ORAL at 20:03

## 2022-09-07 RX ADMIN — HYDROCODONE BITARTRATE AND ACETAMINOPHEN 2 TABLET: 5; 325 TABLET ORAL at 20:03

## 2022-09-07 RX ADMIN — HYDROCODONE BITARTRATE AND ACETAMINOPHEN 2 TABLET: 5; 325 TABLET ORAL at 06:01

## 2022-09-07 RX ADMIN — LEVOTHYROXINE SODIUM 75 MCG: 0.07 TABLET ORAL at 06:01

## 2022-09-07 RX ADMIN — ENOXAPARIN SODIUM 40 MG: 100 INJECTION SUBCUTANEOUS at 08:09

## 2022-09-07 RX ADMIN — ALVIMOPAN 12 MG: 12 CAPSULE ORAL at 08:09

## 2022-09-07 RX ADMIN — PANTOPRAZOLE SODIUM 40 MG: 40 TABLET, DELAYED RELEASE ORAL at 08:09

## 2022-09-07 RX ADMIN — ALVIMOPAN 12 MG: 12 CAPSULE ORAL at 20:03

## 2022-09-07 RX ADMIN — HYDROCODONE BITARTRATE AND ACETAMINOPHEN 2 TABLET: 5; 325 TABLET ORAL at 00:21

## 2022-09-07 RX ADMIN — HYDROCODONE BITARTRATE AND ACETAMINOPHEN 2 TABLET: 5; 325 TABLET ORAL at 15:36

## 2022-09-07 RX ADMIN — METRONIDAZOLE 500 MG: 500 INJECTION, SOLUTION INTRAVENOUS at 06:00

## 2022-09-07 RX ADMIN — ALLOPURINOL 100 MG: 100 TABLET ORAL at 20:03

## 2022-09-07 RX ADMIN — HYDROCODONE BITARTRATE AND ACETAMINOPHEN 1 TABLET: 5; 325 TABLET ORAL at 12:36

## 2022-09-07 NOTE — CASE MANAGEMENT/SOCIAL WORK
Discharge Planning Assessment  [facility]     Patient Name: Laura Cerda  MRN: [id number]  Today's Date: 9/7/2022    Admit Date: 9/6/2022     Discharge Needs Assessment     Row Name 09/07/22 1606       Living Environment    People in Home spouse    Name(s) of People in Home Donal Cerda  [phone]    Current Living Arrangements home    Primary Care Provided by self    Provides Primary Care For no one    Family Caregiver if Needed spouse    Family Caregiver Names Donal Cerda  [phone]    Quality of Family Relationships supportive    Able to Return to Prior Arrangements yes       Resource/Environmental Concerns    Resource/Environmental Concerns none    Transportation Concerns no car       Transition Planning    Patient/Family Anticipates Transition to home with family    Patient/Family Anticipated Services at Transition none    Transportation Anticipated family or friend will provide       Discharge Needs Assessment    Readmission Within the Last 30 Days no previous admission in last 30 days    Equipment Currently Used at Home none    Concerns to be Addressed no discharge needs identified;denies needs/concerns at this time    Anticipated Changes Related to Illness none    Equipment Needed After Discharge none    Provided Post Acute Provider List? N/A    N/A Provider List Comment No needs identified    Provided Post Acute Provider Quality & Resource List? N/A               Discharge Plan     Row Name 09/07/22 1729       Plan    Plan Home with family support    Patient/Family in Agreement with Plan yes    Plan Comments Met with patient and Donal Cerda  [phone] at bedside. Patient granted me permission to speak to her while family remained in the room.  Face sheet verified. Prior to admission patient was independent with all aspects of her ADL’s. She denies using any special or adaptive equipment. Patient uses the [facility] on [address] and [facility], denies any issues affording or remembering to  take her medications. Patient will use [facility] to [facility] upon discharge. Patient does not have POA or living will on file. Discharge plan is to return home with family support. No additional needs identified. Family will transport. Will continue to monitor for new or changing discharge needs.  Fátima GUPTA RN CCP              Continued Care and Services - Admitted Since 9/6/2022    Coordination has not been started for this encounter.       Expected Discharge Date and Time     Expected Discharge Date Expected Discharge Time    Sep 8, 2022          Demographic Summary     Row Name 09/07/22 1605       General Information    Admission Type observation    Arrived From home    Referral Source admission list    Reason for Consult discharge planning    Preferred Language English       Contact Information    Permission Granted to Share Info With family/designee  Donal Cerda  [phone]               Functional Status     Row Name 09/07/22 1606       Functional Status    Usual Activity Tolerance excellent    Current Activity Tolerance good       Functional Status, IADL    Medications independent    Meal Preparation independent    Housekeeping independent    Laundry independent    Shopping independent       Mental Status    General Appearance WDL WDL       Mental Status Summary    Recent Changes in Mental Status/Cognitive Functioning no changes       Employment/    Employment Status retired               Psychosocial    No documentation.                Abuse/Neglect    No documentation.                Legal    No documentation.                Substance Abuse    No documentation.                Patient Forms    No documentation.                   Fátima Pisano RN

## 2022-09-07 NOTE — PLAN OF CARE
Goal Outcome Evaluation:  Plan of Care Reviewed With: patient        Progress: improving  Outcome Evaluation: Border dsgs over lap sites on abdomen--small amt of bloody drainage on dsg. TREE with small amts of serosanginous drainage. Adequate urine output per f/c. Ambulated with assist. No c/o nausea--pain controllled with norco. IS at 1000--continues to practice.

## 2022-09-07 NOTE — PROGRESS NOTES
Postoperative day 1 laparoscopic low anterior resection for chronic recurrent diverticulitis    Overall doing well, pain well controlled, no nausea or vomiting.    Afebrile vital signs stable.  Labs in good order.  Abdomen is soft and dressings are dry.  TREE drain is serosanguineous with low output.    · DC IV fluids  · Advance to full liquid diet  · Ambulate

## 2022-09-07 NOTE — PLAN OF CARE
Goal Outcome Evaluation:  Plan of Care Reviewed With: patient           Outcome Evaluation: boarder dressing to lap sites on abdomen, old drainage, no complaint of nausea, pain controlled with PO meds, IS, SCDS, sat up in chair x1, moore d/c today, voiding well, ambulated with assist,  at bedside, ivf infusing, O2 on and off- uses when sleeping, cough and deep breathing encouraged throughout shift

## 2022-09-08 ENCOUNTER — READMISSION MANAGEMENT (OUTPATIENT)
Dept: CALL CENTER | Facility: HOSPITAL | Age: 62
End: 2022-09-08

## 2022-09-08 VITALS
OXYGEN SATURATION: 96 % | BODY MASS INDEX: 44.85 KG/M2 | HEART RATE: 91 BPM | SYSTOLIC BLOOD PRESSURE: 106 MMHG | HEIGHT: 62 IN | WEIGHT: 243.7 LBS | RESPIRATION RATE: 18 BRPM | DIASTOLIC BLOOD PRESSURE: 64 MMHG | TEMPERATURE: 98.8 F

## 2022-09-08 LAB
GLUCOSE BLDC GLUCOMTR-MCNC: 124 MG/DL (ref 70–130)
GLUCOSE BLDC GLUCOMTR-MCNC: 99 MG/DL (ref 70–130)
LAB AP CASE REPORT: NORMAL
PATH REPORT.FINAL DX SPEC: NORMAL
PATH REPORT.GROSS SPEC: NORMAL

## 2022-09-08 PROCEDURE — 25010000002 ENOXAPARIN PER 10 MG: Performed by: SURGERY

## 2022-09-08 PROCEDURE — 82962 GLUCOSE BLOOD TEST: CPT

## 2022-09-08 PROCEDURE — 99024 POSTOP FOLLOW-UP VISIT: CPT | Performed by: SURGERY

## 2022-09-08 RX ORDER — HYDROCODONE BITARTRATE AND ACETAMINOPHEN 5; 325 MG/1; MG/1
1-2 TABLET ORAL EVERY 4 HOURS PRN
Qty: 24 TABLET | Refills: 0 | Status: SHIPPED | OUTPATIENT
Start: 2022-09-08 | End: 2022-09-19

## 2022-09-08 RX ORDER — ONDANSETRON 4 MG/1
4 TABLET, FILM COATED ORAL EVERY 6 HOURS PRN
Qty: 10 TABLET | Refills: 1 | Status: SHIPPED | OUTPATIENT
Start: 2022-09-08 | End: 2022-10-18

## 2022-09-08 RX ADMIN — ALVIMOPAN 12 MG: 12 CAPSULE ORAL at 08:27

## 2022-09-08 RX ADMIN — HYDROCODONE BITARTRATE AND ACETAMINOPHEN 1 TABLET: 5; 325 TABLET ORAL at 08:27

## 2022-09-08 RX ADMIN — LEVOTHYROXINE SODIUM 75 MCG: 0.07 TABLET ORAL at 05:31

## 2022-09-08 RX ADMIN — PANTOPRAZOLE SODIUM 40 MG: 40 TABLET, DELAYED RELEASE ORAL at 08:27

## 2022-09-08 RX ADMIN — ENOXAPARIN SODIUM 40 MG: 100 INJECTION SUBCUTANEOUS at 08:27

## 2022-09-08 RX ADMIN — HYDROCODONE BITARTRATE AND ACETAMINOPHEN 2 TABLET: 5; 325 TABLET ORAL at 04:26

## 2022-09-08 NOTE — PLAN OF CARE
Goal Outcome Evaluation:           Progress: improving  Outcome Evaluation: Border dressing to lap sites on abdomen intact with old drainage, TREE x 1 with serosanguineous output, TREE dressing reinforced, PO Norco given for pain, Zofran given x 1 for nausea, ambulated in halls, voiding freely, up with assist x 1, 2 L O2 on throughout the night, SL, No BM this shift, VSS, afebrile, IS use encouraged, ice pack to abd. blood sugar monitored, will continue to monitor.

## 2022-09-08 NOTE — DISCHARGE SUMMARY
DATE OF ADMIT: 9/6/2022    DATE OF DISCHARGE: 9/8/2022    DIAGNOSIS: Chronic recurrent diverticulitis    FINAL PATHOLOGY: Pending    PROCEDURES: Laparoscopic low anterior resection with mobilization of splenic flexure 9/6/2022    SUMMARY OF HOSPITAL COURSE:   Uneventful postop course. Tolerating diet, having bowel function, incisions in good order and afebrile with stable vital signs at discharge. Prescribed hydrocodone for pain.    DIET: Regular    ACTIVITY: Walking encouraged, no lifting or strenuous activity    MEDICATIONS: Refer to MAR    FOLLOW-UP: Will be seen in office on September 19    Chris Montano M.D.

## 2022-09-08 NOTE — PROGRESS NOTES
Case Management Discharge Note      Final Note: Discharged home. Maryse Patel RN    Provided Post Acute Provider List?: N/A  N/A Provider List Comment: No needs identified  Provided Post Acute Provider Quality & Resource List?: N/A    Transportation Services  Private: Car    Final Discharge Disposition Code: 01 - home or self-care

## 2022-09-08 NOTE — DISCHARGE INSTRUCTIONS
1.  May shower  2.  Regular diet  3.  No lifting over 20 pounds  4.  My office will call with follow-up date and time

## 2022-09-08 NOTE — OUTREACH NOTE
Prep Survey    Flowsheet Row Responses   [facility] facility patient discharged from? [place]   Is LACE score < 7 ? No   Emergency Room discharge w/ pulse ox? No   Eligibility [facility]   Date of Admission 09/06/22   Date of Discharge 09/08/22   Discharge Disposition Home or Self Care   Discharge diagnosis Diverticulitis  COLON RESECTION   Does the patient have one of the following disease processes/diagnoses(primary or secondary)? General Surgery   Does the patient have Home health ordered? No   Is there a DME ordered? No   Prep survey completed? Yes          JOVANI WALKER - Registered Nurse

## 2022-09-08 NOTE — NURSING NOTE
Patient and  present for discharge instructions.  She received her medications from the retail pharmacy prior to discharge.  Encouraged smaller more frequent meals.  Patient is eager and ready to go home.

## 2022-09-08 NOTE — PROGRESS NOTES
Postoperative day 2 laparoscopic low anterior resection for chronic recurrent diverticulitis    Doing well.  Tolerating full liquids.  No nausea or vomiting.  No bowel function yet.  Urinating effectively without catheter.    Afebrile vital signs stable.  Abdomen soft, incisions healing well with no evidence of infection.  TREE drain serous output.    · Advance to regular diet  · Glycerin suppository ordered  · DC TREE drain  · Possibly home later today

## 2022-09-09 ENCOUNTER — TRANSITIONAL CARE MANAGEMENT TELEPHONE ENCOUNTER (OUTPATIENT)
Dept: CALL CENTER | Facility: HOSPITAL | Age: 62
End: 2022-09-09

## 2022-09-09 NOTE — OUTREACH NOTE
Call Center TCM Note    Flowsheet Row Responses   [facility] patient discharged from? [place]   Does the patient have one of the following disease processes/diagnoses(primary or secondary)? General Surgery   TCM attempt successful? Yes   Call start time 1247   Call end time 1253   Discharge diagnosis Diverticulitis  COLON RESECTION   Meds reviewed with patient/caregiver? Yes   Is the patient having any side effects they believe may be caused by any medication additions or changes? No   Does the patient have all medications related to this admission filled (includes all antibiotics, pain medications, etc.) Yes   Is the patient taking all medications as directed (includes completed medication regime)? Yes   Comments Post-Op 9/19/22 at 10:45 AM   Psychosocial issues? No   Did the patient receive a copy of their discharge instructions? Yes   Nursing interventions Reviewed instructions with patient   What is the patient's perception of their health status since discharge? Improving   Nursing interventions Nurse provided patient education   Is the patient /caregiver able to teach back basic post-op care? Lifting as instructed by MD in discharge instructions, Keep incision areas clean,dry and protected, No tub bath, swimming, or hot tub until instructed by MD, Take showers only when approved by MD-sponge bathe until then   Is the patient/caregiver able to teach back signs and symptoms of incisional infection? Increased redness, swelling or pain at the incisonal site, Increased drainage or bleeding, Pus or odor from incision, Incisional warmth, Fever   Is the patient/caregiver able to teach back steps to recovery at home? Rest and rebuild strength, gradually increase activity, Eat a well-balance diet   If the patient is a current smoker, are they able to teach back resources for cessation? Not a smoker   Is the patient/caregiver able to teach back the hierarchy of who to call/visit for symptoms/problems? PCP,  Specialist, Home health nurse, Urgent Care, ED, 911 Yes   TCM call completed? Yes   Wrap up additional comments Pt states she is doing better, just a little sore at incision site. Pt reports taking Norco for pain management. Reviewed s/s of infection with pt, and when to call surgeon's office if infection noted. Pt verified PCP fu appt on 9/21/22, and post-op appt on 9/19/22.          Dotty Jenkins RN    9/9/2022, 12:57 EDT

## 2022-09-09 NOTE — PAYOR COMM NOTE
"Laura Cerda (62 y.o. Female)     DC SUMMARY FOR [id number]    CONTACT CASANDRA BURNETT  P# [phone]  F# [phone]                Date of Birth   1960    Social Security Number       Address   [address] [place] [address] [place] [address]    Home Phone   [phone]    MRN   [id number]       Restorationist   Anabaptist    Marital Status                               Admission Date   9/6/22    Admission Type   Elective    Admitting Provider   Chris Montano MD    Attending Provider       Department, Room/Bed   [address], P699/1       Discharge Date   9/8/2022    Discharge Disposition   Home or Self Care    Discharge Destination                               Attending Provider: (none)   Allergies: Augmentin [Amoxicillin-pot Clavulanate], Azithromycin, Ceclor [Cefaclor], Lyrica [Pregabalin], Metformin, Penicillin G    Isolation: None   Infection: COVID (History) (09/02/22)   Code Status: Prior   Advance Care Planning Activity    Ht: 157.5 cm (62\")   Wt: 111 kg (243 lb 11.2 oz)    Admission Cmt: None   Principal Problem: Diverticulitis of sigmoid colon [K57.32]                 Active Insurance as of 9/6/2022     Primary Coverage     Payor Plan Insurance Group Employer/Plan Group    [employer] [place] [id number]     Payor Plan Address Payor Plan Phone Number Payor Plan Fax Number Effective Dates    PO    1/1/2021 - None Entered    [facility] [id number]       Subscriber Name Subscriber Birth Date Member ID       LAURA CERDA 1960 [id number]                 Emergency Contacts      (Rel.) Home Phone Work Phone Mobile Phone    Donal Cerda (Spouse) [phone] -- [phone]               Discharge Summary      Chris Montano MD at 09/08/22 1019        DATE OF ADMIT: 9/6/2022    DATE OF DISCHARGE: 9/8/2022    DIAGNOSIS: Chronic recurrent diverticulitis    FINAL PATHOLOGY: Pending    PROCEDURES: Laparoscopic low anterior resection with mobilization " of splenic flexure 9/6/2022    SUMMARY OF HOSPITAL COURSE:   Uneventful postop course. Tolerating diet, having bowel function, incisions in good order and afebrile with stable vital signs at discharge. Prescribed hydrocodone for pain.    DIET: Regular    ACTIVITY: Walking encouraged, no lifting or strenuous activity    MEDICATIONS: Refer to MAR    FOLLOW-UP: Will be seen in office on September 19    Chris Montano M.D.    Electronically signed by Chris Montano MD at 09/08/22 1028

## 2022-09-14 ENCOUNTER — TELEPHONE (OUTPATIENT)
Dept: SURGERY | Facility: CLINIC | Age: 62
End: 2022-09-14

## 2022-09-14 NOTE — TELEPHONE ENCOUNTER
PT CALLED STATING THAT HER INCISION IS SLIGHTLY RED IN CERTAIN AREAS AND SHE WASN'T SURE IF SHE SHOULD BE PUTTING ANYTHING ON IT. HER POST-OP APPT IS 9/19. TRANSFERRED HER BACK TO THE [facility].

## 2022-09-14 NOTE — TELEPHONE ENCOUNTER
Patient left voice mail stating her large incision is a little bit red and appears to have some dry discharge on it.  Requesting a call back.    Returned patient call. Laura stated the incision appears red and feels tight.  She has been washing it was dial soap and water.  Advised patient it is normal for the incision to appear a little bit red and feel tight, and it is okay to  wash the incision with dial soap and water.  Advised patient to not use any type of ointment, lotion, or cram on the incision.  Advised patient to continue watching for signs of infection including discharge from the incision, streaking, redness spreading, warm to touch, painful to touch, nausea, vomiting and fever.  Advised patient she is welcome to send a picture of the incision through [facility] so we can see it.  Patient stated she feels better after a call back and will likely wait and see what the incision looks like in the morning and decide if she needs to send a picture.

## 2022-09-16 ENCOUNTER — TELEPHONE (OUTPATIENT)
Dept: SURGERY | Facility: CLINIC | Age: 62
End: 2022-09-16

## 2022-09-16 NOTE — TELEPHONE ENCOUNTER
Patient left a voice mail stating she has noticed some blood on toilet paper when wiping and has had some nausea.  Request a call back.    Returned patient call.  Patient said she has noticed blood on toilet paper when wiping and would like to know if she should be concerned.  Advised patient it is not uncommon to notice blood on tissue after colon resection, it could be residual blood working it's way out. She voiced understanding.  Advised patient to keep a eye on the amount of blood. She voiced understanding.  Advise patient to call if she sees the toilet bowl is full of blood, if she starts having diarrhea, becomes constipated, if she becomes bloated, or her abdomen is distended.  She voiced understanding.  Advised patient there is a doctor on call 24 hours a day.  She voiced understanding.  She also reported having some nausea today, advised her to take zofran and see if the nausea resolves.  Patient she had already taken zofran and was feeling better.

## 2022-09-19 ENCOUNTER — OFFICE VISIT (OUTPATIENT)
Dept: SURGERY | Facility: CLINIC | Age: 62
End: 2022-09-19

## 2022-09-19 DIAGNOSIS — Z48.89 POSTOPERATIVE VISIT: Primary | ICD-10-CM

## 2022-09-19 PROCEDURE — 99024 POSTOP FOLLOW-UP VISIT: CPT | Performed by: SURGERY

## 2022-09-19 NOTE — PROGRESS NOTES
Postoperative visit    Laparoscopic low anterior resection for chronic recurrent diverticulitis 9/6/2022    Pathology: Diverticulitis    Office visit: Incisions are healing nicely and sutures were removed.  Bowel function is actually on the low side and she is not having any issues with control.  I recommended that she start taking Metamucil powder and anticipate that her bowel function will normalize over the next few months.  Activity restrictions discussed.  Follow-up as needed.

## 2022-09-21 ENCOUNTER — OFFICE VISIT (OUTPATIENT)
Dept: FAMILY MEDICINE CLINIC | Facility: CLINIC | Age: 62
End: 2022-09-21

## 2022-09-21 VITALS
HEIGHT: 62 IN | OXYGEN SATURATION: 98 % | BODY MASS INDEX: 44.53 KG/M2 | SYSTOLIC BLOOD PRESSURE: 108 MMHG | WEIGHT: 242 LBS | TEMPERATURE: 97.1 F | RESPIRATION RATE: 16 BRPM | HEART RATE: 95 BPM | DIASTOLIC BLOOD PRESSURE: 78 MMHG

## 2022-09-21 DIAGNOSIS — M25.512 ACUTE PAIN OF LEFT SHOULDER: ICD-10-CM

## 2022-09-21 DIAGNOSIS — D64.9 ANEMIA, UNSPECIFIED TYPE: ICD-10-CM

## 2022-09-21 DIAGNOSIS — Z09 HOSPITAL DISCHARGE FOLLOW-UP: Primary | ICD-10-CM

## 2022-09-21 DIAGNOSIS — K57.32 DIVERTICULITIS OF SIGMOID COLON: ICD-10-CM

## 2022-09-21 DIAGNOSIS — R53.82 CHRONIC FATIGUE: ICD-10-CM

## 2022-09-21 LAB
ERYTHROCYTE [DISTWIDTH] IN BLOOD BY AUTOMATED COUNT: 17.9 % (ref 12.3–15.4)
FERRITIN SERPL-MCNC: 69.9 NG/ML (ref 13–150)
FOLATE SERPL-MCNC: 14.3 NG/ML (ref 4.78–24.2)
HCT VFR BLD AUTO: 38.5 % (ref 34–46.6)
HGB BLD-MCNC: 12.2 G/DL (ref 12–15.9)
IRON 24H UR-MRATE: 46 MCG/DL (ref 37–145)
IRON SATN MFR SERPL: 10 % (ref 20–50)
LYMPHOCYTES # BLD AUTO: 2.2 10*3/MM3 (ref 0.7–3.1)
LYMPHOCYTES NFR BLD AUTO: 27.1 % (ref 19.6–45.3)
MCH RBC QN AUTO: 26.8 PG (ref 26.6–33)
MCHC RBC AUTO-ENTMCNC: 31.6 G/DL (ref 31.5–35.7)
MCV RBC AUTO: 85 FL (ref 79–97)
MONOCYTES # BLD AUTO: 0.8 10*3/MM3 (ref 0.1–0.9)
MONOCYTES NFR BLD AUTO: 9.4 % (ref 5–12)
NEUTROPHILS NFR BLD AUTO: 5.2 10*3/MM3 (ref 1.7–7)
NEUTROPHILS NFR BLD AUTO: 63.5 % (ref 42.7–76)
PLATELET # BLD AUTO: 284 10*3/MM3 (ref 140–450)
PMV BLD AUTO: 9.1 FL (ref 6–12)
RBC # BLD AUTO: 4.53 10*6/MM3 (ref 3.77–5.28)
TIBC SERPL-MCNC: 483 MCG/DL (ref 298–536)
TRANSFERRIN SERPL-MCNC: 324 MG/DL (ref 200–360)
VIT B12 BLD-MCNC: 488 PG/ML (ref 211–946)
WBC NRBC COR # BLD: 8.2 10*3/MM3 (ref 3.4–10.8)

## 2022-09-21 PROCEDURE — 82607 VITAMIN B-12: CPT | Performed by: NURSE PRACTITIONER

## 2022-09-21 PROCEDURE — 82728 ASSAY OF FERRITIN: CPT | Performed by: NURSE PRACTITIONER

## 2022-09-21 PROCEDURE — 82746 ASSAY OF FOLIC ACID SERUM: CPT | Performed by: NURSE PRACTITIONER

## 2022-09-21 PROCEDURE — 99495 TRANSJ CARE MGMT MOD F2F 14D: CPT | Performed by: NURSE PRACTITIONER

## 2022-09-21 PROCEDURE — 83540 ASSAY OF IRON: CPT | Performed by: NURSE PRACTITIONER

## 2022-09-21 PROCEDURE — 36415 COLL VENOUS BLD VENIPUNCTURE: CPT | Performed by: NURSE PRACTITIONER

## 2022-09-21 PROCEDURE — 85025 COMPLETE CBC W/AUTO DIFF WBC: CPT | Performed by: NURSE PRACTITIONER

## 2022-09-21 PROCEDURE — 84466 ASSAY OF TRANSFERRIN: CPT | Performed by: NURSE PRACTITIONER

## 2022-09-21 NOTE — PROGRESS NOTES
Transitional Care Follow Up Visit  Subjective     Laura Cerda is a 62 y.o. female who presents for a transitional care management visit.    Within 48 business hours after discharge our office contacted her via telephone to coordinate her care and needs.      I reviewed and discussed the details of that call along with the discharge summary, hospital problems, inpatient lab results, inpatient diagnostic studies, and consultation reports with Laura.     Current outpatient and discharge medications have been reconciled for the patient.  Reviewed by: JYOTI Harrington      Date of TCM Phone Call 9/8/2022   [facility]   Date of Admission 9/6/2022   Date of Discharge 9/8/2022   Discharge Disposition Home or Self Care     Risk for Readmission (LACE) Score: 12 (9/8/2022  6:00 AM)      History of Present Illness  Patient presents the office for hospital follow-up.  Patient was admitted on 9/6/1930 9/8/2022.  Patient was admitted with diverticulitis of the sigmoid colon.  She underwent laparoscopic low anterior resection on 9/6/2020.  Patient had her follow-up with GI on 9/20/2022.  Patient is tolerating her diet and having bowel movements.  She is not having any issues today.  Per GI she is starting Metamucil powder.  Patient had stitches removed yesterday without difficulty.  Blood pressure 108/78.  Patient reports an additional complaint today of left shoulder pain.  She has tried Tylenol/OTC Motrin without relief.  This pain has been present for greater than 1 month.                 Course During Hospital Stay:  9/6/2022-9/8/2022     The following portions of the patient's history were reviewed and updated as appropriate: allergies, current medications, past family history, past medical history, past social history, past surgical history and problem list.    Review of Systems   Constitutional: Negative for activity change and appetite change.   HENT: Negative for congestion, postnasal drip, trouble  swallowing and voice change.    Eyes: Negative for visual disturbance.   Respiratory: Negative for chest tightness, shortness of breath and wheezing.    Cardiovascular: Negative for chest pain and palpitations.   Gastrointestinal: Negative for abdominal distention, abdominal pain, anal bleeding, blood in stool, constipation, diarrhea, nausea, rectal pain and vomiting.   Endocrine: Negative for cold intolerance, heat intolerance and polydipsia.   Genitourinary: Negative for difficulty urinating, dysuria, flank pain and frequency.   Musculoskeletal: Negative for arthralgias and back pain.        Left shoulder pain   Skin: Negative for color change, pallor, rash and wound.        Surgical incisions   Stitches removed by GI yesterday  Skin healing, no erythema/not warm to touch    Allergic/Immunologic: Negative for environmental allergies, food allergies and immunocompromised state.   Neurological: Negative for tremors, syncope and weakness.   Hematological: Negative for adenopathy. Does not bruise/bleed easily.   Psychiatric/Behavioral: Negative for agitation, behavioral problems and suicidal ideas.       Objective   Physical Exam  Constitutional:       General: She is not in acute distress.     Appearance: Normal appearance.   Eyes:      Pupils: Pupils are equal, round, and reactive to light.   Cardiovascular:      Rate and Rhythm: Normal rate and regular rhythm.      Pulses: Normal pulses.      Heart sounds: Normal heart sounds.   Pulmonary:      Effort: Pulmonary effort is normal.      Breath sounds: Normal breath sounds. No wheezing or rales.   Musculoskeletal:      Left shoulder: Tenderness present. Decreased range of motion.   Skin:     Comments: Stitches removed yesterday to abdomen.  No erythema no drainage no open sores.   Neurological:      Mental Status: She is alert.   Psychiatric:         Mood and Affect: Mood normal.         Behavior: Behavior normal.         Assessment & Plan   Problems Addressed this  Visit        Gastrointestinal Abdominal     Diverticulitis of sigmoid colon     Patient stable no active symptoms.  No abdominal pain.  Incisions healing            Health Encounters    Hospital discharge follow-up - Primary       Hematology and Neoplasia    Anemia    Relevant Orders    CBC & Differential (Completed)    Ferritin (Completed)    Iron Profile (Completed)    Vitamin B12 & Folate (Completed)       Musculoskeletal and Injuries    Acute pain of left shoulder     Refer patient for left shoulder x-ray.  Radiology to read final report.         Relevant Orders    XR Shoulder 2+ View Left       Symptoms and Signs    Chronic fatigue    Relevant Orders    CBC & Differential (Completed)    Ferritin (Completed)    Iron Profile (Completed)    Vitamin B12 & Folate (Completed)      Diagnoses       Codes Comments    Hospital discharge follow-up    -  Primary ICD-10-CM: Z09  ICD-9-CM: V67.59     Chronic fatigue     ICD-10-CM: R53.82  ICD-9-CM: 780.79     Anemia, unspecified type     ICD-10-CM: D64.9  ICD-9-CM: 285.9     Acute pain of left shoulder     ICD-10-CM: M25.512  ICD-9-CM: 719.41     Diverticulitis of sigmoid colon     ICD-10-CM: K57.32  ICD-9-CM: 562.11

## 2022-09-23 PROBLEM — Z09 HOSPITAL DISCHARGE FOLLOW-UP: Status: ACTIVE | Noted: 2022-09-23

## 2022-09-23 PROBLEM — D64.9 ANEMIA: Status: ACTIVE | Noted: 2022-09-23

## 2022-09-23 PROBLEM — M25.512 ACUTE PAIN OF LEFT SHOULDER: Status: ACTIVE | Noted: 2022-09-23

## 2022-09-27 ENCOUNTER — HOSPITAL ENCOUNTER (OUTPATIENT)
Dept: GENERAL RADIOLOGY | Facility: HOSPITAL | Age: 62
Discharge: HOME | End: 2022-09-27
Admitting: NURSE PRACTITIONER

## 2022-09-27 DIAGNOSIS — M25.512 ACUTE PAIN OF LEFT SHOULDER: ICD-10-CM

## 2022-09-27 PROCEDURE — 73030 X-RAY EXAM OF SHOULDER: CPT

## 2022-09-30 DIAGNOSIS — M25.512 ACUTE PAIN OF LEFT SHOULDER: Primary | ICD-10-CM

## 2022-10-11 ENCOUNTER — LAB (OUTPATIENT)
Dept: FAMILY MEDICINE CLINIC | Facility: CLINIC | Age: 62
End: 2022-10-11

## 2022-10-11 DIAGNOSIS — E55.9 VITAMIN D DEFICIENCY: ICD-10-CM

## 2022-10-11 DIAGNOSIS — I10 PRIMARY HYPERTENSION: Primary | ICD-10-CM

## 2022-10-11 DIAGNOSIS — E11.8 CONTROLLED TYPE 2 DIABETES MELLITUS WITH COMPLICATION, WITHOUT LONG-TERM CURRENT USE OF INSULIN: ICD-10-CM

## 2022-10-11 DIAGNOSIS — E78.00 PURE HYPERCHOLESTEROLEMIA: ICD-10-CM

## 2022-10-11 LAB
25(OH)D3 SERPL-MCNC: 47.9 NG/ML (ref 30–100)
ALBUMIN SERPL-MCNC: 4.3 G/DL (ref 3.5–5.2)
ALBUMIN/GLOB SERPL: 1.7 G/DL
ALP SERPL-CCNC: 109 U/L (ref 39–117)
ALT SERPL W P-5'-P-CCNC: 18 U/L (ref 1–33)
ANION GAP SERPL CALCULATED.3IONS-SCNC: 10.7 MMOL/L (ref 5–15)
AST SERPL-CCNC: 24 U/L (ref 1–32)
BILIRUB SERPL-MCNC: 0.4 MG/DL (ref 0–1.2)
BUN SERPL-MCNC: 13 MG/DL (ref 8–23)
BUN/CREAT SERPL: 16.7 (ref 7–25)
CALCIUM SPEC-SCNC: 9.9 MG/DL (ref 8.6–10.5)
CHLORIDE SERPL-SCNC: 104 MMOL/L (ref 98–107)
CHOLEST SERPL-MCNC: 194 MG/DL (ref 0–200)
CO2 SERPL-SCNC: 30.3 MMOL/L (ref 22–29)
CREAT SERPL-MCNC: 0.78 MG/DL (ref 0.57–1)
DEPRECATED RDW RBC AUTO: 48.5 FL (ref 37–54)
EGFRCR SERPLBLD CKD-EPI 2021: 85.5 ML/MIN/1.73
ERYTHROCYTE [DISTWIDTH] IN BLOOD BY AUTOMATED COUNT: 15 % (ref 12.3–15.4)
GLOBULIN UR ELPH-MCNC: 2.6 GM/DL
GLUCOSE SERPL-MCNC: 105 MG/DL (ref 65–99)
HBA1C MFR BLD: 6 % (ref 4.8–5.6)
HCT VFR BLD AUTO: 38 % (ref 34–46.6)
HDLC SERPL-MCNC: 59 MG/DL (ref 40–60)
HGB BLD-MCNC: 12 G/DL (ref 12–15.9)
LDLC SERPL CALC-MCNC: 106 MG/DL (ref 0–100)
LDLC/HDLC SERPL: 1.72 {RATIO}
MCH RBC QN AUTO: 27.6 PG (ref 26.6–33)
MCHC RBC AUTO-ENTMCNC: 31.6 G/DL (ref 31.5–35.7)
MCV RBC AUTO: 87.6 FL (ref 79–97)
PLATELET # BLD AUTO: 190 10*3/MM3 (ref 140–450)
PMV BLD AUTO: 12.8 FL (ref 6–12)
POTASSIUM SERPL-SCNC: 4.6 MMOL/L (ref 3.5–5.2)
PROT SERPL-MCNC: 6.9 G/DL (ref 6–8.5)
RBC # BLD AUTO: 4.34 10*6/MM3 (ref 3.77–5.28)
SODIUM SERPL-SCNC: 145 MMOL/L (ref 136–145)
TRIGL SERPL-MCNC: 167 MG/DL (ref 0–150)
VLDLC SERPL-MCNC: 29 MG/DL (ref 5–40)
WBC NRBC COR # BLD: 7.13 10*3/MM3 (ref 3.4–10.8)

## 2022-10-11 PROCEDURE — 85027 COMPLETE CBC AUTOMATED: CPT | Performed by: INTERNAL MEDICINE

## 2022-10-11 PROCEDURE — 82306 VITAMIN D 25 HYDROXY: CPT | Performed by: INTERNAL MEDICINE

## 2022-10-11 PROCEDURE — 80061 LIPID PANEL: CPT | Performed by: INTERNAL MEDICINE

## 2022-10-11 PROCEDURE — 80053 COMPREHEN METABOLIC PANEL: CPT | Performed by: INTERNAL MEDICINE

## 2022-10-11 PROCEDURE — 36415 COLL VENOUS BLD VENIPUNCTURE: CPT | Performed by: INTERNAL MEDICINE

## 2022-10-11 PROCEDURE — 83036 HEMOGLOBIN GLYCOSYLATED A1C: CPT | Performed by: INTERNAL MEDICINE

## 2022-10-17 RX ORDER — PIOGLITAZONEHYDROCHLORIDE 15 MG/1
TABLET ORAL
Qty: 30 TABLET | Refills: 3 | Status: SHIPPED | OUTPATIENT
Start: 2022-10-17 | End: 2023-02-14 | Stop reason: SDUPTHER

## 2022-10-18 ENCOUNTER — OFFICE VISIT (OUTPATIENT)
Dept: FAMILY MEDICINE CLINIC | Facility: CLINIC | Age: 62
End: 2022-10-18

## 2022-10-18 VITALS
DIASTOLIC BLOOD PRESSURE: 98 MMHG | TEMPERATURE: 98.4 F | WEIGHT: 244.8 LBS | BODY MASS INDEX: 45.05 KG/M2 | HEART RATE: 104 BPM | OXYGEN SATURATION: 99 % | SYSTOLIC BLOOD PRESSURE: 150 MMHG | HEIGHT: 62 IN

## 2022-10-18 DIAGNOSIS — I10 PRIMARY HYPERTENSION: Primary | ICD-10-CM

## 2022-10-18 DIAGNOSIS — K57.92 DIVERTICULITIS: ICD-10-CM

## 2022-10-18 DIAGNOSIS — E78.00 PURE HYPERCHOLESTEROLEMIA: ICD-10-CM

## 2022-10-18 DIAGNOSIS — E11.8 CONTROLLED TYPE 2 DIABETES MELLITUS WITH COMPLICATION, WITHOUT LONG-TERM CURRENT USE OF INSULIN: ICD-10-CM

## 2022-10-18 PROBLEM — K57.32 DIVERTICULITIS OF SIGMOID COLON: Status: RESOLVED | Noted: 2022-07-18 | Resolved: 2022-10-18

## 2022-10-18 PROBLEM — K57.32 DIVERTICULITIS LARGE INTESTINE: Status: RESOLVED | Noted: 2022-09-06 | Resolved: 2022-10-18

## 2022-10-18 PROCEDURE — 99214 OFFICE O/P EST MOD 30 MIN: CPT | Performed by: INTERNAL MEDICINE

## 2022-10-18 RX ORDER — BLOOD-GLUCOSE METER
1 KIT MISCELLANEOUS DAILY
Qty: 1 EACH | Refills: 0 | Status: SHIPPED | OUTPATIENT
Start: 2022-10-18

## 2022-10-18 RX ORDER — LANCETS 30 GAUGE
EACH MISCELLANEOUS
Qty: 100 EACH | Refills: 5 | Status: SHIPPED | OUTPATIENT
Start: 2022-10-18

## 2022-10-18 NOTE — PROGRESS NOTES
"Chief Complaint  Diabetes, Hyperlipidemia, and Hypothyroidism    Subjective        Laura Cerda presents to [facility]  History of Present Illness patient was seen for hypertension.  Patient blood pressure at home been running 130s over 70s.  Patient will continue monitoring blood pressure monthly.  Patient's lipids been treated with diet exercise and atorvastatin 20 mg daily.  Triglycerides 167, HDL 59, .  Patient will continue present treatment.  Patient's blood sugar has been running in the 130s.  Patient's hemoglobin A1c was 6%.  Patient will continue present diet and activity levels.  Patient did have chronic diverticulitis.  Patient was on constant antibiotics.  Patient had a colon resection for the diverticuli.  Patient states she feels much better and is doing much better.  Patient will begin regular exercise with dieting.    Dictated utilizing Dragon dictation. If there are questions or for further clarification, please contact me.    Objective   Vital Signs:  Blood Pressure 150/98 (BP Location: Left arm, Patient Position: Sitting, Cuff Size: Large Adult)   Pulse 104   Temperature 98.4 °F (36.9 °C) (Infrared)   Height 157.5 cm (62\")   Weight 111 kg (244 lb 12.8 oz)   Oxygen Saturation 99%   Body Mass Index 44.77 kg/m²   Estimated body mass index is 44.77 kg/m² as calculated from the following:    Height as of this encounter: 157.5 cm (62\").    Weight as of this encounter: 111 kg (244 lb 12.8 oz).          Physical Exam  Vitals and nursing note reviewed.   Constitutional:       Appearance: Normal appearance. She is well-developed.   HENT:      Head: Normocephalic and atraumatic.      Nose: Nose normal.      Mouth/Throat:      Mouth: Mucous membranes are moist.      Pharynx: Oropharynx is clear.   Eyes:      Extraocular Movements: Extraocular movements intact.      Conjunctiva/sclera: Conjunctivae normal.      Pupils: Pupils are equal, round, and reactive to light. "   Cardiovascular:      Rate and Rhythm: Normal rate and regular rhythm.      Heart sounds: Normal heart sounds. No murmur heard.    No friction rub. No gallop.   Pulmonary:      Effort: Pulmonary effort is normal. No respiratory distress.      Breath sounds: Normal breath sounds. No stridor. No wheezing, rhonchi or rales.   Chest:      Chest wall: No tenderness.   Abdominal:      General: Bowel sounds are normal.      Palpations: Abdomen is soft.   Musculoskeletal:         General: Normal range of motion.      Cervical back: Normal range of motion and neck supple.   Skin:     General: Skin is warm and dry.   Neurological:      General: No focal deficit present.      Mental Status: She is alert and oriented to person, place, and time. Mental status is at baseline.   Psychiatric:         Mood and Affect: Mood normal.         Behavior: Behavior normal.         Thought Content: Thought content normal.         Judgment: Judgment normal.        Result Review :                Assessment and Plan  #1 continue present diet and activity level #2 monitor blood sugar and blood pressure monthly #3 follow-up in 6 months  Diagnoses and all orders for this visit:    1. Primary hypertension (Primary)    2. Pure hypercholesterolemia    3. Diverticulitis    4. Controlled type 2 diabetes mellitus with complication, without long-term current use of insulin (HCC)    Other orders  -     glucose monitor monitoring kit; 1 each Daily. Daily e11.9  Dispense: 1 each; Refill: 0  -     glucose blood test strip; Daily e11.9  Dispense: 100 each; Refill: 12  -     Lancets misc; 1 daily E 11.9  Dispense: 100 each; Refill: 5             Follow Up   Return in about 6 months (around 4/18/2023), or if symptoms worsen or fail to improve, for Recheck.  Patient was given instructions and counseling regarding her condition or for health maintenance advice. Please see specific information pulled into the AVS if appropriate.

## 2022-10-28 RX ORDER — LEVOTHYROXINE SODIUM 75 UG/1
TABLET ORAL
Qty: 30 TABLET | Refills: 1 | Status: SHIPPED | OUTPATIENT
Start: 2022-10-28 | End: 2022-12-30

## 2022-11-14 RX ORDER — ALLOPURINOL 100 MG/1
TABLET ORAL
Qty: 30 TABLET | Refills: 3 | Status: SHIPPED | OUTPATIENT
Start: 2022-11-14 | End: 2023-03-13 | Stop reason: SDUPTHER

## 2022-11-16 ENCOUNTER — OFFICE VISIT (OUTPATIENT)
Dept: FAMILY MEDICINE CLINIC | Facility: CLINIC | Age: 62
End: 2022-11-16

## 2022-11-16 VITALS
HEART RATE: 87 BPM | BODY MASS INDEX: 45.9 KG/M2 | HEIGHT: 62 IN | OXYGEN SATURATION: 98 % | TEMPERATURE: 98.2 F | WEIGHT: 249.4 LBS | SYSTOLIC BLOOD PRESSURE: 132 MMHG | DIASTOLIC BLOOD PRESSURE: 76 MMHG

## 2022-11-16 DIAGNOSIS — R05.9 COUGH, UNSPECIFIED TYPE: ICD-10-CM

## 2022-11-16 DIAGNOSIS — R06.2 WHEEZING: Primary | ICD-10-CM

## 2022-11-16 DIAGNOSIS — J01.00 SUBACUTE MAXILLARY SINUSITIS: ICD-10-CM

## 2022-11-16 PROCEDURE — 99213 OFFICE O/P EST LOW 20 MIN: CPT | Performed by: INTERNAL MEDICINE

## 2022-11-16 RX ORDER — DOXYCYCLINE HYCLATE 100 MG
100 TABLET ORAL 2 TIMES DAILY
Qty: 20 TABLET | Refills: 0 | Status: SHIPPED | OUTPATIENT
Start: 2022-11-16 | End: 2023-02-23

## 2022-11-16 RX ORDER — ALBUTEROL SULFATE 90 UG/1
2 AEROSOL, METERED RESPIRATORY (INHALATION) EVERY 6 HOURS PRN
Qty: 18 G | Refills: 0 | Status: SHIPPED | OUTPATIENT
Start: 2022-11-16

## 2022-11-16 RX ORDER — LEVOTHYROXINE SODIUM 0.05 MG/1
TABLET ORAL EVERY 24 HOURS
COMMUNITY
End: 2023-02-23

## 2022-11-16 RX ORDER — FLUTICASONE PROPIONATE 50 MCG
2 SPRAY, SUSPENSION (ML) NASAL DAILY
Qty: 18.2 ML | Refills: 1 | Status: SHIPPED | OUTPATIENT
Start: 2022-11-16

## 2022-11-16 RX ORDER — IXEKIZUMAB 80 MG/ML
INJECTION, SOLUTION SUBCUTANEOUS
COMMUNITY
Start: 2022-10-31

## 2022-11-16 NOTE — PROGRESS NOTES
"Chief Complaint  congestion head, Cough (Thick whitish), and Sore Throat    Subjective        Laura Cerda presents to [facility]  History of Present Illness patient had a wheeze over the last week.  She also has a white productive cough.  Patient's wheezing is episodic and she gets chronic sinus infections.  Patient is clear to auscultation is tender around the maxillary sinuses.  Patient has been checked for COVID which was negative.  Patient is put on doxycycline her milligrams twice daily and was given albuterol inhaler 2 puffs every 6 as needed wheezing.  Patient was also started on Flonase nasal spray and will use it as needed.  If patient not better in 4 days will be checked for flu.  Patient did not want to check at this time.    Dictated utilizing Dragon dictation. If there are questions or for further clarification, please contact me.    Objective   Vital Signs:  Blood Pressure 132/76   Pulse 87   Temperature 98.2 °F (36.8 °C)   Height 157.5 cm (62\")   Weight 113 kg (249 lb 6.4 oz)   Oxygen Saturation 98%   Body Mass Index 45.62 kg/m²   Estimated body mass index is 45.62 kg/m² as calculated from the following:    Height as of this encounter: 157.5 cm (62\").    Weight as of this encounter: 113 kg (249 lb 6.4 oz).          Physical Exam  Vitals and nursing note reviewed.   Constitutional:       Appearance: Normal appearance. She is well-developed.   HENT:      Head: Normocephalic and atraumatic.      Nose: Nose normal.      Mouth/Throat:      Mouth: Mucous membranes are moist.      Pharynx: Oropharynx is clear.   Eyes:      Extraocular Movements: Extraocular movements intact.      Conjunctiva/sclera: Conjunctivae normal.      Pupils: Pupils are equal, round, and reactive to light.   Cardiovascular:      Rate and Rhythm: Normal rate and regular rhythm.      Heart sounds: Normal heart sounds. No murmur heard.    No friction rub. No gallop.   Pulmonary:      Effort: Pulmonary " effort is normal. No respiratory distress.      Breath sounds: Normal breath sounds. No stridor. No wheezing, rhonchi or rales.   Chest:      Chest wall: No tenderness.   Abdominal:      General: Bowel sounds are normal.      Palpations: Abdomen is soft.   Musculoskeletal:         General: Normal range of motion.      Cervical back: Normal range of motion and neck supple.   Skin:     General: Skin is warm and dry.   Neurological:      General: No focal deficit present.      Mental Status: She is alert and oriented to person, place, and time. Mental status is at baseline.   Psychiatric:         Mood and Affect: Mood normal.         Behavior: Behavior normal.         Thought Content: Thought content normal.         Judgment: Judgment normal.        Result Review :                Assessment and Plan  #1 doxycycline 100 mg twice daily, Flonase nasal spray 2 puffs once a day, albuterol HFA inhaler 2 puffs every 8 as needed wheezing #2 not better in 4 days flu testing  Diagnoses and all orders for this visit:    1. Wheezing (Primary)    2. Cough, unspecified type    3. Subacute maxillary sinusitis    Other orders  -     doxycycline (VIBRAMYICN) 100 MG tablet; Take 1 tablet by mouth 2 (Two) Times a Day.  Dispense: 20 tablet; Refill: 0  -     fluticasone (FLONASE) 50 MCG/ACT nasal spray; 2 sprays into the nostril(s) as directed by provider Daily.  Dispense: 18.2 mL; Refill: 1  -     albuterol sulfate  (90 Base) MCG/ACT inhaler; Inhale 2 puffs Every 6 (Six) Hours As Needed for Wheezing.  Dispense: 18 g; Refill: 0             Follow Up   Return in about 1 week (around 11/23/2022), or if symptoms worsen or fail to improve, for Recheck.  Patient was given instructions and counseling regarding her condition or for health maintenance advice. Please see specific information pulled into the AVS if appropriate.

## 2022-12-30 RX ORDER — LEVOTHYROXINE SODIUM 75 UG/1
TABLET ORAL
Qty: 90 TABLET | Refills: 1 | Status: SHIPPED | OUTPATIENT
Start: 2022-12-30 | End: 2023-02-14 | Stop reason: SDUPTHER

## 2023-02-14 ENCOUNTER — TELEPHONE (OUTPATIENT)
Dept: FAMILY MEDICINE CLINIC | Facility: CLINIC | Age: 63
End: 2023-02-14
Payer: COMMERCIAL

## 2023-02-14 RX ORDER — LEVOTHYROXINE SODIUM 75 UG/1
75 TABLET ORAL DAILY
Qty: 90 TABLET | Refills: 0 | Status: SHIPPED | OUTPATIENT
Start: 2023-02-14

## 2023-02-14 RX ORDER — PIOGLITAZONEHYDROCHLORIDE 15 MG/1
15 TABLET ORAL DAILY
Qty: 30 TABLET | Refills: 3 | Status: SHIPPED | OUTPATIENT
Start: 2023-02-14

## 2023-02-23 ENCOUNTER — OFFICE VISIT (OUTPATIENT)
Dept: OBSTETRICS AND GYNECOLOGY | Age: 63
End: 2023-02-23
Payer: COMMERCIAL

## 2023-02-23 ENCOUNTER — PROCEDURE VISIT (OUTPATIENT)
Dept: OBSTETRICS AND GYNECOLOGY | Age: 63
End: 2023-02-23
Payer: COMMERCIAL

## 2023-02-23 VITALS
BODY MASS INDEX: 46.56 KG/M2 | WEIGHT: 253 LBS | HEIGHT: 62 IN | SYSTOLIC BLOOD PRESSURE: 130 MMHG | DIASTOLIC BLOOD PRESSURE: 84 MMHG

## 2023-02-23 DIAGNOSIS — Z12.4 ENCOUNTER FOR PAPANICOLAOU SMEAR FOR CERVICAL CANCER SCREENING: ICD-10-CM

## 2023-02-23 DIAGNOSIS — Z01.419 WELL WOMAN EXAM WITH ROUTINE GYNECOLOGICAL EXAM: Primary | ICD-10-CM

## 2023-02-23 DIAGNOSIS — Z12.31 VISIT FOR SCREENING MAMMOGRAM: Primary | ICD-10-CM

## 2023-02-23 DIAGNOSIS — Z11.51 SCREENING FOR HPV (HUMAN PAPILLOMAVIRUS): ICD-10-CM

## 2023-02-23 PROCEDURE — 77067 SCR MAMMO BI INCL CAD: CPT | Performed by: STUDENT IN AN ORGANIZED HEALTH CARE EDUCATION/TRAINING PROGRAM

## 2023-02-23 PROCEDURE — 99396 PREV VISIT EST AGE 40-64: CPT | Performed by: PHYSICIAN ASSISTANT

## 2023-02-23 PROCEDURE — 77063 BREAST TOMOSYNTHESIS BI: CPT | Performed by: STUDENT IN AN ORGANIZED HEALTH CARE EDUCATION/TRAINING PROGRAM

## 2023-02-23 RX ORDER — NABUMETONE 750 MG/1
TABLET, FILM COATED ORAL
COMMUNITY
Start: 2023-02-16 | End: 2023-02-23

## 2023-02-23 RX ORDER — LEFLUNOMIDE 20 MG/1
1 TABLET ORAL DAILY
COMMUNITY
Start: 2023-02-16

## 2023-02-23 NOTE — PROGRESS NOTES
Subjective     Chief Complaint   Patient presents with   • Gynecologic Exam     annual exam last pap 2019 neg/neg, m/g done today, c/scope 2018       History of Present Illness    Laura Cerda is a 63 y.o.  who presents for annual exam.    She is doing well  Mom did come live with her for a few months but just recently moved to a senior living facility    Had to have 12 inches of her colon removed d/t diverticulitis  Is feeling better since they did this  Have told her to avoid a colonoscopy for now  Was done by Dr Montano   Has recovered fully and sx's have improved    She had mammogram done today    Is due for pap    Has complicated med hx  Sees PCP routinely    Has noted some weight gain r/t her mom living with her and cooking 3 meals a day with limited activity  Plans to get back into the swing of things    Pt of dr Orantes    Obstetric History:  OB History        2    Para   2    Term   2            AB        Living   2       SAB        IAB        Ectopic        Molar        Multiple        Live Births   2               Menstrual History:     No LMP recorded. Patient is postmenopausal.         Current contraception: post menopausal status  History of abnormal Pap smear: no  Received Gardasil immunization: no  Perform regular self breast exam: yes - occl  Family history of uterine or ovarian cancer: no  Family History of colon cancer: no  Family history of breast cancer: no    Mammogram: done today.  Colonoscopy: up to date.  DEXA: not indicated.    Exercise: not active  Calcium/Vitamin D: adequate intake    The following portions of the patient's history were reviewed and updated as appropriate: allergies, current medications, past family history, past medical history, past social history, past surgical history and problem list.    Review of Systems    Review of Systems   Constitutional: Negative for fatigue.   Respiratory: Negative for shortness of breath.    Gastrointestinal: Negative  "for abdominal pain.   Genitourinary: Negative for dysuria.   Neurological: Negative for headaches.   Psychiatric/Behavioral: Negative for dysphoric mood.         Objective   Physical Exam    /84   Ht 157.5 cm (62\")   Wt 115 kg (253 lb)   BMI 46.27 kg/m²   General:   alert, comfortable and no distress   Heart: regular rate and rhythm   Lungs: clear to auscultation bilaterally   Breast: normal appearance, no masses or tenderness, Inspection negative, No nipple retraction or dimpling, No nipple discharge or bleeding, No axillary or supraclavicular adenopathy, Normal to palpation without dominant masses   Neck: no adenopathy and no carotid bruit   Abdomen: normal findings: soft, non-tender   CVA: Not performed today   Pelvis: External genitalia: normal general appearance  Vaginal: normal mucosa without prolapse or lesions  Cervix: normal appearance and thin prep PAP obtained  Adnexa: normal bimanual exam and non palpable  Uterus: normal single, nontender  Exam limited d/t body habitus   Extremities: Not performed today   Neurologic: negative   Psychiatric: Normal affect, judgement, and mood     Assessment & Plan   Diagnoses and all orders for this visit:    1. Well woman exam with routine gynecological exam (Primary)    2. Encounter for Papanicolaou smear for cervical cancer screening  -     IGP, Aptima HPV, Rfx 16 / 18,45    3. Screening for HPV (human papillomavirus)  -     IGP, Aptima HPV, Rfx 16 / 18,45        All questions answered.  Breast self exam technique reviewed and patient encouraged to perform self-exam monthly.  Discussed healthy lifestyle modifications.  Recommended 30 minutes of aerobic exercise five times per week.  Discussed calcium needs to prevent osteoporosis.    Pap done  Mammogram utd  Enc good diet and increased activity, walking or water aerobics would be a great starting point               "

## 2023-03-01 LAB
CYTOLOGIST CVX/VAG CYTO: NORMAL
CYTOLOGY CVX/VAG DOC CYTO: NORMAL
CYTOLOGY CVX/VAG DOC THIN PREP: NORMAL
DX ICD CODE: NORMAL
HIV 1 & 2 AB SER-IMP: NORMAL
HPV GENOTYPE REFLEX: NORMAL
HPV I/H RISK 4 DNA CVX QL PROBE+SIG AMP: NEGATIVE
OTHER STN SPEC: NORMAL
STAT OF ADQ CVX/VAG CYTO-IMP: NORMAL

## 2023-03-02 RX ORDER — ATORVASTATIN CALCIUM 20 MG/1
20 TABLET, FILM COATED ORAL NIGHTLY
Qty: 90 TABLET | Refills: 1 | Status: SHIPPED | OUTPATIENT
Start: 2023-03-02

## 2023-03-13 RX ORDER — ALLOPURINOL 100 MG/1
100 TABLET ORAL DAILY
Qty: 30 TABLET | Refills: 3 | Status: SHIPPED | OUTPATIENT
Start: 2023-03-13

## 2023-03-13 RX ORDER — ERGOCALCIFEROL 1.25 MG/1
CAPSULE ORAL
Qty: 24 CAPSULE | Refills: 1 | Status: SHIPPED | OUTPATIENT
Start: 2023-03-13

## 2023-04-14 DIAGNOSIS — E78.00 PURE HYPERCHOLESTEROLEMIA: ICD-10-CM

## 2023-04-14 DIAGNOSIS — E11.8 CONTROLLED TYPE 2 DIABETES MELLITUS WITH COMPLICATION, WITHOUT LONG-TERM CURRENT USE OF INSULIN: Primary | ICD-10-CM

## 2023-04-14 DIAGNOSIS — M1A.09X0 IDIOPATHIC CHRONIC GOUT OF MULTIPLE SITES WITHOUT TOPHUS: ICD-10-CM

## 2023-04-14 DIAGNOSIS — E03.9 HYPOTHYROIDISM, UNSPECIFIED TYPE: ICD-10-CM

## 2023-04-17 ENCOUNTER — OFFICE VISIT (OUTPATIENT)
Dept: SURGERY | Facility: CLINIC | Age: 63
End: 2023-04-17
Payer: COMMERCIAL

## 2023-04-17 VITALS — HEIGHT: 62 IN | BODY MASS INDEX: 47.99 KG/M2 | WEIGHT: 260.8 LBS

## 2023-04-17 DIAGNOSIS — R19.7 DIARRHEA, UNSPECIFIED TYPE: ICD-10-CM

## 2023-04-17 DIAGNOSIS — Z86.010 HISTORY OF COLON POLYPS: ICD-10-CM

## 2023-04-17 DIAGNOSIS — R19.8 CHANGE IN BOWEL FUNCTION: Primary | ICD-10-CM

## 2023-04-17 PROBLEM — Z86.0100 HISTORY OF COLON POLYPS: Status: ACTIVE | Noted: 2023-04-17

## 2023-04-17 PROCEDURE — 99213 OFFICE O/P EST LOW 20 MIN: CPT | Performed by: PHYSICIAN ASSISTANT

## 2023-04-17 RX ORDER — FUROSEMIDE 20 MG/1
20 TABLET ORAL
COMMUNITY
End: 2023-04-17

## 2023-04-17 RX ORDER — FEXOFENADINE HCL 180 MG/1
TABLET ORAL EVERY 24 HOURS
COMMUNITY

## 2023-04-17 RX ORDER — LEVOTHYROXINE SODIUM 0.05 MG/1
TABLET ORAL EVERY 24 HOURS
COMMUNITY
End: 2023-04-17 | Stop reason: ALTCHOICE

## 2023-04-17 RX ORDER — DOXYCYCLINE HYCLATE 100 MG
TABLET ORAL EVERY 12 HOURS SCHEDULED
COMMUNITY
End: 2023-04-17

## 2023-04-17 RX ORDER — RISANKIZUMAB-RZAA 150 MG/ML
INJECTION SUBCUTANEOUS
COMMUNITY
Start: 2023-04-10

## 2023-04-18 DIAGNOSIS — E78.00 PURE HYPERCHOLESTEROLEMIA: ICD-10-CM

## 2023-04-18 DIAGNOSIS — E03.9 HYPOTHYROIDISM, UNSPECIFIED TYPE: ICD-10-CM

## 2023-04-18 DIAGNOSIS — M1A.09X0 IDIOPATHIC CHRONIC GOUT OF MULTIPLE SITES WITHOUT TOPHUS: ICD-10-CM

## 2023-04-18 DIAGNOSIS — E11.8 CONTROLLED TYPE 2 DIABETES MELLITUS WITH COMPLICATION, WITHOUT LONG-TERM CURRENT USE OF INSULIN: ICD-10-CM

## 2023-04-18 NOTE — H&P (VIEW-ONLY)
"ASSESSMENT/PLAN:    This is a 63-year-old lady presenting with a change in her bowel habits over the last 3 months since her low anterior colon resection in September 2022.  With her last colonoscopy having been in 2018 with a history of polyps as well as her more recent changes, we will proceed with a colonoscopy with random biopsies.  Risks and rationale were discussed with her with the risk including but not limited to bleeding, infection, bowel perforation and the need for additional procedures.  Any additional follow-up will be discussed once the results of been reviewed.  All questions were answered and she was willing to proceed with all recommendations.    CC:     Change in bowel habits    HPI:    This is a 63-year-old lady returning to the office today after having undergone a laparoscopic low anterior colon resection by Dr. Montano in September 2022.  She had been doing very well however over the last 3 months she has begun to notice a change in her bowel habits going from constipated to diarrhea, sometimes having 15 bowel movements in a day.  She does feel as if she has a pressure in her rectum on some days and occasionally has had an urgency to go to the restroom and has soiled herself.  Additionally she has noticed a change in the \"odor\" of her bowel movements.  She denies having nausea, vomiting, fever, chills, abdominal pain, abdominal distention, dark tarry stools or bright red blood with her bowel movements.    ENDOSCOPY:   • Colonoscopy 2018 with a history of polyps  • EGD 2018 normal per patient    SOCIAL HISTORY:   • Denies tobacco use  • Occasional alcohol use    FAMILY HISTORY:    • Colorectal cancer: Paternal grandmother    PREVIOUS ABDOMINAL SURGERY    • Cholecystectomy  • Tubal abdominal ligation  • Laparoscopic low anterior colon resection    OTHER SURGERY  Past Surgical History:   Procedure Laterality Date   • BREAST BIOPSY Right 07/17/2013    benign   • CATARACT EXTRACTION  " 17;17   •  SECTION     •  SECTION     • CHOLECYSTECTOMY     • COLON RESECTION N/A 2022    Procedure: LAPAROSCOPIC LOW ANTERIOR COLON RESECTION;  Surgeon: Chris Montano MD;  Location: [facility] MAIN OR;  Service: General;  Laterality: N/A;   • COLONOSCOPY N/A 2018    Procedure: COLONOSCOPY INTO CECUM AND TERMINAL ILEUM WTIH COLD BIOPSIES;  Surgeon: Reji Rodríguez MD;  Location: [facility] ENDOSCOPY;  Service: Gastroenterology   • ENDOSCOPY N/A 2018    Procedure: ESOPHAGOGASTRODUODENOSCOPY WITH BIOPSIES;  Surgeon: Reji Rodríguez MD;  Location: [facility] ENDOSCOPY;  Service: Gastroenterology   • ENDOSCOPY N/A 2018    Procedure: ESOPHAGOGASTRODUODENOSCOPY;  Surgeon: Reji Rodríguez MD;  Location: [facility] ENDOSCOPY;  Service: Gastroenterology   • HEMORRHOIDECTOMY     • REPLACEMENT TOTAL KNEE Right 2020   • TUBAL ABDOMINAL LIGATION     • WISDOM TOOTH EXTRACTION         PAST MEDICAL HISTORY:    Past Medical History:   Diagnosis Date   • Anemia during pregnancies   • Arthritis     psoriatic   • Asthma     sometimes   • Chest pain    • Cholelithiasis    • Chronic kidney disease    • Colon polyp    • COVID-19 04/15/2022   • Diabetes mellitus    • Diverticulitis of colon    • Elevated PTHrP level    • Fatigue    • Fibromyalgia    • Fibromyalgia, primary    • Foot swelling    • GERD (gastroesophageal reflux disease)    • Gout    • Hernia 2oo1   • History of Holter monitoring 2016   • Hyperlipidemia    • Hyperparathyroidism    • Hypertension    • Hypothyroidism    • Joint pain     worsening   • Meniere's disease    • Nausea    • Osteoarthritis    • Palpitations    • PONV (postoperative nausea and vomiting)    • Postmenopausal    • Psoriatic arthritis    • Rapid heart rate    • Raynaud disease    • Thyroid disease    • Vitamin D deficiency        MEDICATIONS:     Current Outpatient Medications:   •  acetaminophen (TYLENOL) 500 MG tablet,  Take 1 tablet by mouth As Needed for Mild Pain., Disp: , Rfl:   •  albuterol sulfate  (90 Base) MCG/ACT inhaler, Inhale 2 puffs Every 6 (Six) Hours As Needed for Wheezing., Disp: 18 g, Rfl: 0  •  allopurinol (ZYLOPRIM) 100 MG tablet, Take 1 tablet by mouth Daily., Disp: 30 tablet, Rfl: 3  •  atorvastatin (LIPITOR) 20 MG tablet, Take 1 tablet by mouth Every Night., Disp: 90 tablet, Rfl: 1  •  fexofenadine (ALLEGRA) 180 MG tablet, Daily., Disp: , Rfl:   •  fluticasone (FLONASE) 50 MCG/ACT nasal spray, 2 sprays into the nostril(s) as directed by provider Daily., Disp: 18.2 mL, Rfl: 1  •  glucose blood test strip, Daily e11.9, Disp: 100 each, Rfl: 12  •  glucose monitor monitoring kit, 1 each Daily. Daily e11.9, Disp: 1 each, Rfl: 0  •  Lancets misc, 1 daily E 11.9, Disp: 100 each, Rfl: 5  •  Multiple Vitamins-Minerals (MULTIVITAMIN ADULT PO), Take 1 tablet by mouth Daily., Disp: , Rfl:   •  pantoprazole (PROTONIX) 40 MG EC tablet, Take 1 tablet by mouth Daily., Disp: 90 tablet, Rfl: 3  •  pioglitazone (ACTOS) 15 MG tablet, Take 1 tablet by mouth Daily., Disp: 30 tablet, Rfl: 3  •  Saccharomyces boulardii (FLORASTOR PO), Take 1 tablet by mouth Daily., Disp: , Rfl:   •  sertraline (ZOLOFT) 50 MG tablet, Take 1 tablet by mouth Every Evening., Disp: 90 tablet, Rfl: 0  •  Skyrizi Pen 150 MG/ML solution auto-injector, , Disp: , Rfl:   •  tiZANidine (ZANAFLEX) 2 MG tablet, Take 1 tablet by mouth At Night As Needed for Muscle Spasms., Disp: , Rfl:   •  Unithroid 75 MCG tablet, Take 1 tablet by mouth Daily., Disp: 90 tablet, Rfl: 0  •  vitamin D (ERGOCALCIFEROL) 1.25 MG (22673 UT) capsule capsule, 1 po qweek, Disp: 24 capsule, Rfl: 1    ALLERGIES:   Allergies   Allergen Reactions   • Augmentin [Amoxicillin-Pot Clavulanate] Hives and Shortness Of Breath     Temperature high   • Azithromycin Rash   • Ceclor [Cefaclor] Rash   • Lyrica [Pregabalin] Swelling   • Metformin GI Intolerance   • Penicillin G Other (See Comments)  "    Does not take because of augmentin       PHYSICAL EXAM:   • Constitutional: Well-developed well-nourished, no acute distress  • Vital signs:   o Height 62\"  o Weight 260  o BMI 47.7 Discussed with patient increased perioperative risks associated with obesity including increased risks of DVT, infection, seromas, poor wound healing and hernias (with abdominal surgery).  • Neck: Supple, no palpable mass, trachea midline  • Respiratory: Clear to auscultation, normal inspiratory effort  • Cardiovascular: Regular rate, no murmur, no carotid bruit  • Gastrointestinal: Soft, nontender  • Psychiatric: Alert and oriented ×3, normal affect       Jose Meehan PA-C    "

## 2023-04-18 NOTE — PROGRESS NOTES
"ASSESSMENT/PLAN:    This is a 63-year-old lady presenting with a change in her bowel habits over the last 3 months since her low anterior colon resection in September 2022.  With her last colonoscopy having been in 2018 with a history of polyps as well as her more recent changes, we will proceed with a colonoscopy with random biopsies.  Risks and rationale were discussed with her with the risk including but not limited to bleeding, infection, bowel perforation and the need for additional procedures.  Any additional follow-up will be discussed once the results of been reviewed.  All questions were answered and she was willing to proceed with all recommendations.    CC:     Change in bowel habits    HPI:    This is a 63-year-old lady returning to the office today after having undergone a laparoscopic low anterior colon resection by Dr. Montano in September 2022.  She had been doing very well however over the last 3 months she has begun to notice a change in her bowel habits going from constipated to diarrhea, sometimes having 15 bowel movements in a day.  She does feel as if she has a pressure in her rectum on some days and occasionally has had an urgency to go to the restroom and has soiled herself.  Additionally she has noticed a change in the \"odor\" of her bowel movements.  She denies having nausea, vomiting, fever, chills, abdominal pain, abdominal distention, dark tarry stools or bright red blood with her bowel movements.    ENDOSCOPY:   • Colonoscopy 2018 with a history of polyps  • EGD 2018 normal per patient    SOCIAL HISTORY:   • Denies tobacco use  • Occasional alcohol use    FAMILY HISTORY:    • Colorectal cancer: Paternal grandmother    PREVIOUS ABDOMINAL SURGERY    • Cholecystectomy  • Tubal abdominal ligation  • Laparoscopic low anterior colon resection    OTHER SURGERY  Past Surgical History:   Procedure Laterality Date   • BREAST BIOPSY Right 07/17/2013    benign   • CATARACT EXTRACTION  " 17;17   •  SECTION     •  SECTION     • CHOLECYSTECTOMY     • COLON RESECTION N/A 2022    Procedure: LAPAROSCOPIC LOW ANTERIOR COLON RESECTION;  Surgeon: Chris Montano MD;  Location: [facility] MAIN OR;  Service: General;  Laterality: N/A;   • COLONOSCOPY N/A 2018    Procedure: COLONOSCOPY INTO CECUM AND TERMINAL ILEUM WTIH COLD BIOPSIES;  Surgeon: Reji Rodríguez MD;  Location: [facility] ENDOSCOPY;  Service: Gastroenterology   • ENDOSCOPY N/A 2018    Procedure: ESOPHAGOGASTRODUODENOSCOPY WITH BIOPSIES;  Surgeon: Reji Rodríguez MD;  Location: [facility] ENDOSCOPY;  Service: Gastroenterology   • ENDOSCOPY N/A 2018    Procedure: ESOPHAGOGASTRODUODENOSCOPY;  Surgeon: Reji Rodríguez MD;  Location: [facility] ENDOSCOPY;  Service: Gastroenterology   • HEMORRHOIDECTOMY     • REPLACEMENT TOTAL KNEE Right 2020   • TUBAL ABDOMINAL LIGATION     • WISDOM TOOTH EXTRACTION         PAST MEDICAL HISTORY:    Past Medical History:   Diagnosis Date   • Anemia during pregnancies   • Arthritis     psoriatic   • Asthma     sometimes   • Chest pain    • Cholelithiasis    • Chronic kidney disease    • Colon polyp    • COVID-19 04/15/2022   • Diabetes mellitus    • Diverticulitis of colon    • Elevated PTHrP level    • Fatigue    • Fibromyalgia    • Fibromyalgia, primary    • Foot swelling    • GERD (gastroesophageal reflux disease)    • Gout    • Hernia 2oo1   • History of Holter monitoring 2016   • Hyperlipidemia    • Hyperparathyroidism    • Hypertension    • Hypothyroidism    • Joint pain     worsening   • Meniere's disease    • Nausea    • Osteoarthritis    • Palpitations    • PONV (postoperative nausea and vomiting)    • Postmenopausal    • Psoriatic arthritis    • Rapid heart rate    • Raynaud disease    • Thyroid disease    • Vitamin D deficiency        MEDICATIONS:     Current Outpatient Medications:   •  acetaminophen (TYLENOL) 500 MG tablet,  Take 1 tablet by mouth As Needed for Mild Pain., Disp: , Rfl:   •  albuterol sulfate  (90 Base) MCG/ACT inhaler, Inhale 2 puffs Every 6 (Six) Hours As Needed for Wheezing., Disp: 18 g, Rfl: 0  •  allopurinol (ZYLOPRIM) 100 MG tablet, Take 1 tablet by mouth Daily., Disp: 30 tablet, Rfl: 3  •  atorvastatin (LIPITOR) 20 MG tablet, Take 1 tablet by mouth Every Night., Disp: 90 tablet, Rfl: 1  •  fexofenadine (ALLEGRA) 180 MG tablet, Daily., Disp: , Rfl:   •  fluticasone (FLONASE) 50 MCG/ACT nasal spray, 2 sprays into the nostril(s) as directed by provider Daily., Disp: 18.2 mL, Rfl: 1  •  glucose blood test strip, Daily e11.9, Disp: 100 each, Rfl: 12  •  glucose monitor monitoring kit, 1 each Daily. Daily e11.9, Disp: 1 each, Rfl: 0  •  Lancets misc, 1 daily E 11.9, Disp: 100 each, Rfl: 5  •  Multiple Vitamins-Minerals (MULTIVITAMIN ADULT PO), Take 1 tablet by mouth Daily., Disp: , Rfl:   •  pantoprazole (PROTONIX) 40 MG EC tablet, Take 1 tablet by mouth Daily., Disp: 90 tablet, Rfl: 3  •  pioglitazone (ACTOS) 15 MG tablet, Take 1 tablet by mouth Daily., Disp: 30 tablet, Rfl: 3  •  Saccharomyces boulardii (FLORASTOR PO), Take 1 tablet by mouth Daily., Disp: , Rfl:   •  sertraline (ZOLOFT) 50 MG tablet, Take 1 tablet by mouth Every Evening., Disp: 90 tablet, Rfl: 0  •  Skyrizi Pen 150 MG/ML solution auto-injector, , Disp: , Rfl:   •  tiZANidine (ZANAFLEX) 2 MG tablet, Take 1 tablet by mouth At Night As Needed for Muscle Spasms., Disp: , Rfl:   •  Unithroid 75 MCG tablet, Take 1 tablet by mouth Daily., Disp: 90 tablet, Rfl: 0  •  vitamin D (ERGOCALCIFEROL) 1.25 MG (55595 UT) capsule capsule, 1 po qweek, Disp: 24 capsule, Rfl: 1    ALLERGIES:   Allergies   Allergen Reactions   • Augmentin [Amoxicillin-Pot Clavulanate] Hives and Shortness Of Breath     Temperature high   • Azithromycin Rash   • Ceclor [Cefaclor] Rash   • Lyrica [Pregabalin] Swelling   • Metformin GI Intolerance   • Penicillin G Other (See Comments)  "    Does not take because of augmentin       PHYSICAL EXAM:   • Constitutional: Well-developed well-nourished, no acute distress  • Vital signs:   o Height 62\"  o Weight 260  o BMI 47.7 Discussed with patient increased perioperative risks associated with obesity including increased risks of DVT, infection, seromas, poor wound healing and hernias (with abdominal surgery).  • Neck: Supple, no palpable mass, trachea midline  • Respiratory: Clear to auscultation, normal inspiratory effort  • Cardiovascular: Regular rate, no murmur, no carotid bruit  • Gastrointestinal: Soft, nontender  • Psychiatric: Alert and oriented ×3, normal affect       Jose Meehan PA-C    "

## 2023-04-19 LAB
ALBUMIN SERPL-MCNC: 4.3 G/DL (ref 3.5–5.2)
ALBUMIN/GLOB SERPL: 1.8 G/DL
ALP SERPL-CCNC: 129 U/L (ref 39–117)
ALT SERPL-CCNC: 13 U/L (ref 1–33)
AST SERPL-CCNC: 17 U/L (ref 1–32)
BILIRUB SERPL-MCNC: 0.2 MG/DL (ref 0–1.2)
BUN SERPL-MCNC: 13 MG/DL (ref 8–23)
BUN/CREAT SERPL: 17.3 (ref 7–25)
CALCIUM SERPL-MCNC: 9.6 MG/DL (ref 8.6–10.5)
CHLORIDE SERPL-SCNC: 105 MMOL/L (ref 98–107)
CHOLEST SERPL-MCNC: 211 MG/DL (ref 0–200)
CO2 SERPL-SCNC: 27.1 MMOL/L (ref 22–29)
CREAT SERPL-MCNC: 0.75 MG/DL (ref 0.57–1)
EGFRCR SERPLBLD CKD-EPI 2021: 89 ML/MIN/1.73
ERYTHROCYTE [DISTWIDTH] IN BLOOD BY AUTOMATED COUNT: 15.5 % (ref 12.3–15.4)
FT4I SERPL CALC-MCNC: 2.4 (ref 1.2–4.9)
GLOBULIN SER CALC-MCNC: 2.4 GM/DL
GLUCOSE SERPL-MCNC: 105 MG/DL (ref 65–99)
HBA1C MFR BLD: 6.2 % (ref 4.8–5.6)
HCT VFR BLD AUTO: 37.4 % (ref 34–46.6)
HDLC SERPL-MCNC: 50 MG/DL (ref 40–60)
HGB BLD-MCNC: 11.8 G/DL (ref 12–15.9)
LDLC SERPL CALC-MCNC: 128 MG/DL (ref 0–100)
MCH RBC QN AUTO: 26.9 PG (ref 26.6–33)
MCHC RBC AUTO-ENTMCNC: 31.6 G/DL (ref 31.5–35.7)
MCV RBC AUTO: 85.2 FL (ref 79–97)
PLATELET # BLD AUTO: 229 10*3/MM3 (ref 140–450)
POTASSIUM SERPL-SCNC: 4.2 MMOL/L (ref 3.5–5.2)
PROT SERPL-MCNC: 6.7 G/DL (ref 6–8.5)
RBC # BLD AUTO: 4.39 10*6/MM3 (ref 3.77–5.28)
SODIUM SERPL-SCNC: 144 MMOL/L (ref 136–145)
T3RU NFR SERPL: 28 % (ref 24–39)
T4 SERPL-MCNC: 8.6 UG/DL (ref 4.5–12)
TRIGL SERPL-MCNC: 189 MG/DL (ref 0–150)
TSH SERPL DL<=0.005 MIU/L-ACNC: 2.95 UIU/ML (ref 0.45–4.5)
URATE SERPL-MCNC: 5.7 MG/DL (ref 2.4–5.7)
VLDLC SERPL CALC-MCNC: 33 MG/DL (ref 5–40)
WBC # BLD AUTO: 6.94 10*3/MM3 (ref 3.4–10.8)

## 2023-04-25 ENCOUNTER — OFFICE VISIT (OUTPATIENT)
Dept: FAMILY MEDICINE CLINIC | Facility: CLINIC | Age: 63
End: 2023-04-25
Payer: COMMERCIAL

## 2023-04-25 VITALS
BODY MASS INDEX: 47.7 KG/M2 | WEIGHT: 259.2 LBS | HEIGHT: 62 IN | SYSTOLIC BLOOD PRESSURE: 130 MMHG | OXYGEN SATURATION: 100 % | HEART RATE: 73 BPM | DIASTOLIC BLOOD PRESSURE: 90 MMHG | TEMPERATURE: 97.5 F

## 2023-04-25 DIAGNOSIS — E06.3 HYPOTHYROIDISM DUE TO HASHIMOTO'S THYROIDITIS: ICD-10-CM

## 2023-04-25 DIAGNOSIS — Z00.00 ANNUAL PHYSICAL EXAM: ICD-10-CM

## 2023-04-25 DIAGNOSIS — E11.8 CONTROLLED TYPE 2 DIABETES MELLITUS WITH COMPLICATION, WITHOUT LONG-TERM CURRENT USE OF INSULIN: ICD-10-CM

## 2023-04-25 DIAGNOSIS — E66.01 MORBID OBESITY DUE TO EXCESS CALORIES: ICD-10-CM

## 2023-04-25 DIAGNOSIS — Z00.00 ENCOUNTER FOR MEDICAL EXAMINATION TO ESTABLISH CARE: ICD-10-CM

## 2023-04-25 DIAGNOSIS — I10 PRIMARY HYPERTENSION: Primary | ICD-10-CM

## 2023-04-25 DIAGNOSIS — E78.00 PURE HYPERCHOLESTEROLEMIA: ICD-10-CM

## 2023-04-25 DIAGNOSIS — E03.8 HYPOTHYROIDISM DUE TO HASHIMOTO'S THYROIDITIS: ICD-10-CM

## 2023-04-25 RX ORDER — MULTIVIT WITH MINERALS/LUTEIN
1000 TABLET ORAL DAILY
COMMUNITY

## 2023-04-25 RX ORDER — LEVOTHYROXINE SODIUM 75 UG/1
75 TABLET ORAL DAILY
Qty: 90 TABLET | Refills: 1 | Status: SHIPPED | OUTPATIENT
Start: 2023-04-25

## 2023-04-25 NOTE — PROGRESS NOTES
"Chief Complaint  Establish Care ([facility] patient), Diabetes, and Hypertension    Subjective        Laura Cerda presents to [facility]  History of Present Illness  Patient presents to the office for an annual visit. Patient blood pressure is BP: 130/90 (04/25/23 0956).  Patient denies chest pain / shortness of air. Patient is without acute pain or distress at time of dictation.   Patient does not smoke.  EtOH: none   No drug abuse  Diet: regular  Exercise: regularly   Family history of cancer  Patient presents office today to establish care with me.  She has diabetes, hypertension.  Blood pressure is 130/90.      Objective   Vital Signs:  /90 (BP Location: Left arm, Patient Position: Sitting, Cuff Size: Adult)   Pulse 73   Temp 97.5 °F (36.4 °C)   Ht 157.5 cm (62\")   Wt 118 kg (259 lb 3.2 oz)   SpO2 100%   BMI 47.41 kg/m²   Estimated body mass index is 47.41 kg/m² as calculated from the following:    Height as of this encounter: 157.5 cm (62\").    Weight as of this encounter: 118 kg (259 lb 3.2 oz).             Physical Exam  Constitutional:       General: She is not in acute distress.     Appearance: Normal appearance.   HENT:      Head: Normocephalic.      Right Ear: Tympanic membrane, ear canal and external ear normal. There is no impacted cerumen.      Left Ear: Tympanic membrane, ear canal and external ear normal. There is no impacted cerumen.      Nose: Nose normal. No congestion or rhinorrhea.      Mouth/Throat:      Mouth: Mucous membranes are moist.   Eyes:      Pupils: Pupils are equal, round, and reactive to light.   Cardiovascular:      Rate and Rhythm: Normal rate.      Pulses: Normal pulses.      Heart sounds: Normal heart sounds.   Pulmonary:      Effort: Pulmonary effort is normal. No respiratory distress.      Breath sounds: Normal breath sounds. No stridor. No wheezing, rhonchi or rales.   Chest:      Chest wall: No tenderness.   Abdominal:      General: " Bowel sounds are normal.      Palpations: Abdomen is soft.      Tenderness: There is no abdominal tenderness.   Musculoskeletal:         General: Normal range of motion.      Cervical back: Normal range of motion and neck supple.      Comments: Rheumatology / generalized    Skin:     General: Skin is warm.   Neurological:      General: No focal deficit present.      Mental Status: She is alert and oriented to person, place, and time.   Psychiatric:         Mood and Affect: Mood normal.         Behavior: Behavior normal.         Thought Content: Thought content normal.         Judgment: Judgment normal.        Result Review :                   Assessment and Plan   Diagnoses and all orders for this visit:    1. Primary hypertension (Primary)    2. Pure hypercholesterolemia    3. Hypothyroidism due to Hashimoto's thyroiditis  -     Unithroid 75 MCG tablet; Take 1 tablet by mouth Daily.  Dispense: 90 tablet; Refill: 1    4. Morbid obesity due to excess calories    5. Controlled type 2 diabetes mellitus with complication, without long-term current use of insulin    6. Annual physical exam    7. Encounter for medical examination to establish care      Counseling was provided on nutrition, physical activity, development, and injury prevention, dental health, and safe sex practices patient verbalizes understanding no additional questions were asked.         Follow Up   No follow-ups on file.  Patient was given instructions and counseling regarding her condition or for health maintenance advice. Please see specific information pulled into the AVS if appropriate.

## 2023-05-10 ENCOUNTER — ANESTHESIA EVENT (OUTPATIENT)
Dept: PERIOP | Facility: HOSPITAL | Age: 63
End: 2023-05-10
Payer: COMMERCIAL

## 2023-05-11 ENCOUNTER — HOSPITAL ENCOUNTER (OUTPATIENT)
Facility: HOSPITAL | Age: 63
Setting detail: HOSPITAL OUTPATIENT SURGERY
Discharge: HOME | End: 2023-05-11
Attending: SURGERY | Admitting: SURGERY
Payer: COMMERCIAL

## 2023-05-11 ENCOUNTER — ANESTHESIA (OUTPATIENT)
Dept: PERIOP | Facility: HOSPITAL | Age: 63
End: 2023-05-11
Payer: COMMERCIAL

## 2023-05-11 VITALS
OXYGEN SATURATION: 98 % | SYSTOLIC BLOOD PRESSURE: 155 MMHG | DIASTOLIC BLOOD PRESSURE: 70 MMHG | RESPIRATION RATE: 16 BRPM | HEART RATE: 73 BPM | TEMPERATURE: 98.1 F | BODY MASS INDEX: 47.92 KG/M2 | HEIGHT: 62 IN | WEIGHT: 260.4 LBS

## 2023-05-11 DIAGNOSIS — R19.8 CHANGE IN BOWEL FUNCTION: ICD-10-CM

## 2023-05-11 DIAGNOSIS — R19.7 DIARRHEA, UNSPECIFIED TYPE: ICD-10-CM

## 2023-05-11 DIAGNOSIS — Z86.010 HISTORY OF COLON POLYPS: ICD-10-CM

## 2023-05-11 LAB — GLUCOSE BLDC GLUCOMTR-MCNC: 80 MG/DL (ref 70–130)

## 2023-05-11 PROCEDURE — 82948 REAGENT STRIP/BLOOD GLUCOSE: CPT

## 2023-05-11 PROCEDURE — 88305 TISSUE EXAM BY PATHOLOGIST: CPT | Performed by: SURGERY

## 2023-05-11 PROCEDURE — 25010000002 ONDANSETRON PER 1 MG: Performed by: NURSE ANESTHETIST, CERTIFIED REGISTERED

## 2023-05-11 PROCEDURE — 45380 COLONOSCOPY AND BIOPSY: CPT | Performed by: SURGERY

## 2023-05-11 PROCEDURE — 25010000002 PROPOFOL 200 MG/20ML EMULSION: Performed by: NURSE ANESTHETIST, CERTIFIED REGISTERED

## 2023-05-11 RX ORDER — SODIUM CHLORIDE 0.9 % (FLUSH) 0.9 %
10 SYRINGE (ML) INJECTION AS NEEDED
Status: DISCONTINUED | OUTPATIENT
Start: 2023-05-11 | End: 2023-05-11 | Stop reason: HOSPADM

## 2023-05-11 RX ORDER — ONDANSETRON 2 MG/ML
4 INJECTION INTRAMUSCULAR; INTRAVENOUS ONCE AS NEEDED
Status: DISCONTINUED | OUTPATIENT
Start: 2023-05-11 | End: 2023-05-11 | Stop reason: HOSPADM

## 2023-05-11 RX ORDER — SODIUM CHLORIDE, SODIUM LACTATE, POTASSIUM CHLORIDE, CALCIUM CHLORIDE 600; 310; 30; 20 MG/100ML; MG/100ML; MG/100ML; MG/100ML
9 INJECTION, SOLUTION INTRAVENOUS CONTINUOUS
Status: DISCONTINUED | OUTPATIENT
Start: 2023-05-11 | End: 2023-05-11 | Stop reason: HOSPADM

## 2023-05-11 RX ORDER — LIDOCAINE HYDROCHLORIDE 20 MG/ML
INJECTION, SOLUTION INFILTRATION; PERINEURAL AS NEEDED
Status: DISCONTINUED | OUTPATIENT
Start: 2023-05-11 | End: 2023-05-11 | Stop reason: SURG

## 2023-05-11 RX ORDER — LIDOCAINE HYDROCHLORIDE 10 MG/ML
0.5 INJECTION, SOLUTION EPIDURAL; INFILTRATION; INTRACAUDAL; PERINEURAL ONCE AS NEEDED
Status: DISCONTINUED | OUTPATIENT
Start: 2023-05-11 | End: 2023-05-11 | Stop reason: HOSPADM

## 2023-05-11 RX ORDER — ONDANSETRON 2 MG/ML
4 INJECTION INTRAMUSCULAR; INTRAVENOUS ONCE
Status: COMPLETED | OUTPATIENT
Start: 2023-05-11 | End: 2023-05-11

## 2023-05-11 RX ORDER — PROPOFOL 10 MG/ML
INJECTION, EMULSION INTRAVENOUS AS NEEDED
Status: DISCONTINUED | OUTPATIENT
Start: 2023-05-11 | End: 2023-05-11 | Stop reason: SURG

## 2023-05-11 RX ORDER — SODIUM CHLORIDE 9 MG/ML
40 INJECTION, SOLUTION INTRAVENOUS AS NEEDED
Status: DISCONTINUED | OUTPATIENT
Start: 2023-05-11 | End: 2023-05-11 | Stop reason: HOSPADM

## 2023-05-11 RX ORDER — SODIUM CHLORIDE 0.9 % (FLUSH) 0.9 %
10 SYRINGE (ML) INJECTION EVERY 12 HOURS SCHEDULED
Status: DISCONTINUED | OUTPATIENT
Start: 2023-05-11 | End: 2023-05-11 | Stop reason: HOSPADM

## 2023-05-11 RX ORDER — SODIUM CHLORIDE, SODIUM LACTATE, POTASSIUM CHLORIDE, CALCIUM CHLORIDE 600; 310; 30; 20 MG/100ML; MG/100ML; MG/100ML; MG/100ML
100 INJECTION, SOLUTION INTRAVENOUS CONTINUOUS
Status: DISCONTINUED | OUTPATIENT
Start: 2023-05-11 | End: 2023-05-11 | Stop reason: HOSPADM

## 2023-05-11 RX ADMIN — ONDANSETRON 4 MG: 2 INJECTION INTRAMUSCULAR; INTRAVENOUS at 11:42

## 2023-05-11 RX ADMIN — PROPOFOL INJECTABLE EMULSION 50 MG: 10 INJECTION, EMULSION INTRAVENOUS at 12:22

## 2023-05-11 RX ADMIN — PROPOFOL INJECTABLE EMULSION 50 MG: 10 INJECTION, EMULSION INTRAVENOUS at 12:24

## 2023-05-11 RX ADMIN — PROPOFOL INJECTABLE EMULSION 50 MG: 10 INJECTION, EMULSION INTRAVENOUS at 12:30

## 2023-05-11 RX ADMIN — PROPOFOL INJECTABLE EMULSION 50 MG: 10 INJECTION, EMULSION INTRAVENOUS at 12:33

## 2023-05-11 RX ADMIN — LIDOCAINE HYDROCHLORIDE 100 MG: 20 INJECTION, SOLUTION INFILTRATION; PERINEURAL at 12:20

## 2023-05-11 RX ADMIN — PROPOFOL INJECTABLE EMULSION 50 MG: 10 INJECTION, EMULSION INTRAVENOUS at 12:20

## 2023-05-11 RX ADMIN — PROPOFOL INJECTABLE EMULSION 50 MG: 10 INJECTION, EMULSION INTRAVENOUS at 12:27

## 2023-05-11 RX ADMIN — SODIUM CHLORIDE, POTASSIUM CHLORIDE, SODIUM LACTATE AND CALCIUM CHLORIDE 9 ML/HR: 600; 310; 30; 20 INJECTION, SOLUTION INTRAVENOUS at 11:42

## 2023-05-11 NOTE — ANESTHESIA PREPROCEDURE EVALUATION
Anesthesia Evaluation     Patient summary reviewed and Nursing notes reviewed   history of anesthetic complications: PONV  NPO Solid Status: > 8 hours  NPO Liquid Status: > 2 hours           Airway   Mallampati: II  Dental - normal exam     Pulmonary - normal exam   (+) asthma (seasonal),sleep apnea,   Cardiovascular - normal exam  Exercise tolerance: good (4-7 METS)    ECG reviewed  Rhythm: regular  Rate: normal    (+) hyperlipidemia,   (-) hypertension    ROS comment: HEART RATE= 71  bpm  RR Interval= 840  ms  MN Interval= 152  ms  P Horizontal Axis= 13  deg  P Front Axis= 40  deg  QRSD Interval= 81  ms  QT Interval= 393  ms  QRS Axis= 14  deg  T Wave Axis= 30  deg  - OTHERWISE NORMAL ECG -  Sinus rhythm  Low voltage, precordial leads  NO PRIOR TRACING AVAILABLE FOR COMPARISON  Electronically Signed By: [facility] ([facility] 12-Aug-2022 21:17:33  Date and Time of Study: 2022-08-12 13:12:50    Neuro/Psych  (+) psychiatric history Anxiety,    GI/Hepatic/Renal/Endo    (+) obesity, morbid obesity, GERD well controlled,  liver disease fatty liver disease, diabetes mellitus type 2 well controlled, thyroid problem hypothyroidism  (-) PUD, no renal disease    Musculoskeletal     (+) myalgias (fibromyalgia),   Abdominal  - normal exam   Substance History      OB/GYN          Other   arthritis,      ROS/Med Hx Other: 2016: Echo  ? All left ventricular wall segments contract normally.  ? Left ventricular function is normal. Estimated EF = 64%.  ? Normal stress echo with no significant echocardiographic evidence for myocardial ischemia.  ? Overall, the patient's exercise capacity was moderately impaired.                       Anesthesia Plan    ASA 3     MAC     intravenous induction     Anesthetic plan, risks, benefits, and alternatives have been provided, discussed and informed consent has been obtained with: patient.    Use of blood products discussed with patient  Consented to blood products.       CODE STATUS:

## 2023-05-11 NOTE — OP NOTE
PREOPERATIVE DIAGNOSIS:  • Change in bowel habits with intermittent diarrhea    POSTOPERATIVE DIAGNOSIS AND FINDINGS:  • Diverticulosis scattered throughout the colon  • Widely patent colorectal anastomosis  • Normal mucosa    PROCEDURE:  Colonoscopy to cecum with random biopsies    SURGEON:  Chris Montano MD    ANESTHESIA:  MAC    SPECIMEN(S):  • Random colon biopsies    DESCRIPTION:  In decubitus position digital rectal exam was normal. Colonoscope inserted under direct visualization of lumen to cecum confirmed by visualization of ileocecal valve and appendiceal orifice.  Scope was slowly withdrawn circumferentially examining all mucosal surfaces.  Bowel preparation was good and there were no mucosal abnormalities.  Random biopsies were taken.  Scattered diverticulosis seen throughout the colon.  Tolerated well.    RECOMMENDATION FOR FUTURE SURVEILLANCE:  To be determined based on polyp pathology and issued as separate report    Chris Montano M.D.

## 2023-05-11 NOTE — ANESTHESIA POSTPROCEDURE EVALUATION
Patient: Laura Cerda    Procedure Summary     Date: 05/11/23 Room / Location: [facility] ENDOSCOPY 2 /  [facility] OR    Anesthesia Start: 1216 Anesthesia Stop: 1236    Procedure: COLONOSCOPY WITH RANDOM BIOPSIES Diagnosis:       Change in bowel function      History of colon polyps      Diarrhea, unspecified type      (Change in bowel function [R19.8])      (History of colon polyps [Z86.010])      (Diarrhea, unspecified type [R19.7])    Surgeons: Chris Montano MD Provider: Jazmin Dent CRNA    Anesthesia Type: MAC ASA Status: 3          Anesthesia Type: MAC    Vitals  No vitals data found for the desired time range.          Post Anesthesia Care and Evaluation    Patient location during evaluation: bedside  Patient participation: complete - patient participated  Level of consciousness: awake and alert  Pain score: 0  Pain management: adequate    Airway patency: patent  Anesthetic complications: No anesthetic complications  PONV Status: none  Cardiovascular status: acceptable  Respiratory status: acceptable  Hydration status: acceptable

## 2023-05-16 LAB
LAB AP CASE REPORT: NORMAL
PATH REPORT.FINAL DX SPEC: NORMAL
PATH REPORT.GROSS SPEC: NORMAL

## 2023-05-18 RX ORDER — PANTOPRAZOLE SODIUM 40 MG/1
TABLET, DELAYED RELEASE ORAL
Qty: 90 TABLET | Refills: 3 | Status: SHIPPED | OUTPATIENT
Start: 2023-05-18

## 2023-05-22 ENCOUNTER — TELEPHONE (OUTPATIENT)
Dept: SURGERY | Facility: CLINIC | Age: 63
End: 2023-05-22
Payer: COMMERCIAL

## 2023-05-22 ENCOUNTER — DOCUMENTATION (OUTPATIENT)
Dept: SURGERY | Facility: CLINIC | Age: 63
End: 2023-05-22
Payer: COMMERCIAL

## 2023-05-22 NOTE — TELEPHONE ENCOUNTER
----- Message from Chris Montano MD sent at 5/22/2023  6:28 AM EDT -----  Please let her know that her colonoscopy was basically normal and random biopsies did not show any evidence of colitis or other explanation for change in bowel habits.  10-year surveillance recommended-put in computer for reminder

## 2023-05-22 NOTE — PROGRESS NOTES
Please let her know that her colonoscopy was basically normal and random biopsies did not show any evidence of colitis or other explanation for change in bowel habits.  10-year surveillance recommended-put in computer for reminder

## 2023-05-22 NOTE — PROGRESS NOTES
ENDOSCOPY FOLLOW UP NOTE    Colonoscopy 5/11/2023    Indication:  • Change in bowel habits    Findings:  • Diverticulosis: Pathology from random biopsies negative    Recommendations:  • 10-year surveillance    Chris Montano M.D.  
Abnormal WBC: < 4,000 OR > 12,000

## 2023-06-06 DIAGNOSIS — E11.8 CONTROLLED TYPE 2 DIABETES MELLITUS WITH COMPLICATION, WITHOUT LONG-TERM CURRENT USE OF INSULIN: Primary | ICD-10-CM

## 2023-06-12 RX ORDER — ALLOPURINOL 100 MG/1
100 TABLET ORAL DAILY
Qty: 30 TABLET | Refills: 3 | Status: SHIPPED | OUTPATIENT
Start: 2023-06-12

## 2023-06-19 ENCOUNTER — TELEPHONE (OUTPATIENT)
Dept: FAMILY MEDICINE CLINIC | Facility: CLINIC | Age: 63
End: 2023-06-19
Payer: COMMERCIAL

## 2023-06-19 DIAGNOSIS — E11.8 CONTROLLED TYPE 2 DIABETES MELLITUS WITH COMPLICATION, WITHOUT LONG-TERM CURRENT USE OF INSULIN: ICD-10-CM

## 2023-06-19 NOTE — TELEPHONE ENCOUNTER
Caller: Laura Cerda    Relationship: Self    Best call back number: [phone]     What was the call regarding: PATIENT STATES HER JARDIANCE NEEDS A PRIOR AUTH, SHE STILL HAS NEVER RECEIVED. PLEASE ADVISE IF THE PRIOR AUTH HAS BEEN STARTED. SHE HAS NOT HAD ANY MEDICATION IN EIGHT DAYS.     Is it okay if the provider responds through MyChart: YES

## 2023-07-29 NOTE — TELEPHONE ENCOUNTER
Patient called and her rheumatologist is recommending her to have a MRI due to her results from her x ray from her spine and hip. She can barely walk and her results show she needs to have a MRI. They will be faxing over her results from Dr. Contreras's office but her INS states she needs to have a referral from PCP office. Please advise at [phone].  -Rheumatologist  [address]   Denies any tobacco or alcohol use    Reports father  age 70 unknown cause  Mother age living 74 unknown medical problems

## 2023-08-15 NOTE — TELEPHONE ENCOUNTER
Called pt and advised per Dr Rodríguez that the stomach bx showed moderate gastropathy and was neg for h pylori. The ge junction bx showed mild chronic inflammation. He recommends to continue ppi and can stop or taper carafate. Pt verb understanding.    Patient ID: Dorothy Ivy, 1969, [id number], 46 y.o. Referred by : FEDERICO Demarco CNP   Reason for consultation:   MGUS  HISTORY OF PRESENT ILLNESS:    Oncologic History:  Dorothy Ivy is a 46 y.o. female with history of diabetes, hypertension, coronary disease was seen during initial consultation visit for MGUS    Patient has been following with nephrologist for her chronic kidney disease and had a lab drawn in May which showed kappa light chain slightly returned at 4.07, lambda light chain at 3.7 and kappa/lambda ratio of 1.08. Her lab work also showed 0.04 g/dL IgM lambda monoclonal protein therefore patient was referred hematology for further evaluation. She denies any unintentional weight loss, drenching night sweats, fever or chills. Denies any new bone pain or back pain. INTERVAL history  Patient is returning for follow-up visit and to discuss lab results and further recommendations. She denies any new bone pain or back pain. No unintentional weight loss night sweats fever chills. During this visit patient's allergy, social, medical, surgical history and medications were reviewed and updated.    Past Medical History:   Diagnosis Date    Diabetes mellitus ([address])     Hyperlipidemia     Hypertension        Past Surgical History:   Procedure Laterality Date    CHOLECYSTECTOMY  07/06/2018    HYSTERECTOMY (CERVIX STATUS UNKNOWN)  07/31/2017       No Known Allergies    Current Outpatient Medications   Medication Sig Dispense Refill    amLODIPine (NORVASC) 2.5 MG tablet 1 tablet daily      atorvastatin (LIPITOR) 40 MG tablet 1 tablet daily      carvedilol (COREG) 25 MG tablet 1 tablet 2 times daily      glyBURIDE (DIABETA) 5 MG tablet 2 tablets 2 times daily (with meals)      insulin 70-30 (HUMULIN;NOVOLIN) (70-30) 100 UNIT per ML injection vial 70 Units 2 times daily      lisinopril (PRINIVIL;ZESTRIL) 30 MG tablet 1 tablet daily      metFORMIN (GLUCOPHAGE) 1000 MG tablet 1

## 2023-09-11 RX ORDER — ALLOPURINOL 100 MG/1
TABLET ORAL
Qty: 90 TABLET | Refills: 1 | Status: SHIPPED | OUTPATIENT
Start: 2023-09-11

## 2023-09-19 DIAGNOSIS — E11.8 CONTROLLED TYPE 2 DIABETES MELLITUS WITH COMPLICATION, WITHOUT LONG-TERM CURRENT USE OF INSULIN: ICD-10-CM

## 2023-09-20 RX ORDER — EMPAGLIFLOZIN 10 MG/1
TABLET, FILM COATED ORAL
Qty: 30 TABLET | Refills: 2 | OUTPATIENT
Start: 2023-09-20

## 2023-10-02 DIAGNOSIS — Z12.31 VISIT FOR SCREENING MAMMOGRAM: Primary | ICD-10-CM

## 2023-10-11 ENCOUNTER — OFFICE VISIT (OUTPATIENT)
Dept: FAMILY MEDICINE CLINIC | Facility: CLINIC | Age: 63
End: 2023-10-11
Payer: COMMERCIAL

## 2023-10-11 VITALS
WEIGHT: 259 LBS | OXYGEN SATURATION: 92 % | HEART RATE: 82 BPM | RESPIRATION RATE: 20 BRPM | BODY MASS INDEX: 47.66 KG/M2 | SYSTOLIC BLOOD PRESSURE: 136 MMHG | DIASTOLIC BLOOD PRESSURE: 80 MMHG | HEIGHT: 62 IN | TEMPERATURE: 98.1 F

## 2023-10-11 DIAGNOSIS — J02.9 SORE THROAT: ICD-10-CM

## 2023-10-11 DIAGNOSIS — J30.89 ENVIRONMENTAL AND SEASONAL ALLERGIES: ICD-10-CM

## 2023-10-11 DIAGNOSIS — J06.9 ACUTE URI: ICD-10-CM

## 2023-10-11 DIAGNOSIS — R05.1 ACUTE COUGH: Primary | ICD-10-CM

## 2023-10-11 LAB
EXPIRATION DATE: NORMAL
EXPIRATION DATE: NORMAL
FLUAV AG UPPER RESP QL IA.RAPID: NOT DETECTED
FLUBV AG UPPER RESP QL IA.RAPID: NOT DETECTED
INTERNAL CONTROL: NORMAL
INTERNAL CONTROL: NORMAL
Lab: NORMAL
Lab: NORMAL
S PYO AG THROAT QL: NEGATIVE
SARS-COV-2 AG UPPER RESP QL IA.RAPID: NOT DETECTED

## 2023-10-11 RX ORDER — CLINDAMYCIN HYDROCHLORIDE 300 MG/1
300 CAPSULE ORAL 3 TIMES DAILY
Qty: 21 CAPSULE | Refills: 0 | Status: SHIPPED | OUTPATIENT
Start: 2023-10-11 | End: 2023-10-18

## 2023-10-11 RX ORDER — FLUTICASONE PROPIONATE 50 MCG
2 SPRAY, SUSPENSION (ML) NASAL DAILY
Qty: 18.2 ML | Refills: 1 | Status: SHIPPED | OUTPATIENT
Start: 2023-10-11

## 2023-10-11 RX ORDER — FEXOFENADINE HCL 180 MG/1
180 TABLET ORAL DAILY
Qty: 30 TABLET | Refills: 5 | Status: SHIPPED | OUTPATIENT
Start: 2023-10-11

## 2023-10-11 NOTE — PROGRESS NOTES
"Chief Complaint  Cough (Congestion/ cough productive today sx started 1 week ago//labs in chart from rheumatology drawn last week)    Subjective        Laura Cerda presents to [facility]  History of Present Illness  63-year-old white female who usually follows nurse vannesa Ennis for primary care is here for acute care visit.  Patient complaining of cough and sore throat symptoms for the past 7 days.  Cough  This is a new problem. The current episode started in the past 7 days. The problem has been gradually worsening. The cough is Productive of sputum. Associated symptoms include headaches, nasal congestion, postnasal drip, rhinorrhea and a sore throat. Pertinent negatives include no chills or fever. She has tried OTC cough suppressant for the symptoms. The treatment provided moderate relief.       Objective   Vital Signs:  /80 (BP Location: Left arm, Patient Position: Sitting, Cuff Size: Large Adult)   Pulse 82   Temp 98.1 øF (36.7 øC) (Oral)   Resp 20   Ht 157.5 cm (62\")   Wt 117 kg (259 lb)   SpO2 92%   BMI 47.37 kg/mý   Estimated body mass index is 47.37 kg/mý as calculated from the following:    Height as of this encounter: 157.5 cm (62\").    Weight as of this encounter: 117 kg (259 lb).               Physical Exam  Constitutional:       Appearance: Normal appearance.   HENT:      Head: Normocephalic and atraumatic.      Nose:      Comments: Mild maxillary sinus pressure to palpation b/l  Eyes:      Conjunctiva/sclera: Conjunctivae normal.   Cardiovascular:      Rate and Rhythm: Normal rate and regular rhythm.      Heart sounds: Normal heart sounds.   Pulmonary:      Effort: Pulmonary effort is normal.      Breath sounds: Normal breath sounds.   Abdominal:      General: Bowel sounds are normal.      Palpations: Abdomen is soft.      Comments: Non-tender   Skin:     General: Skin is warm.   Neurological:      General: No focal deficit present.      " Mental Status: She is alert and oriented to person, place, and time.   Psychiatric:         Mood and Affect: Mood normal.         Behavior: Behavior normal.        Result Review :  The following data was reviewed by: JYOTI Ortega on 10/11/2023:  Common labs          4/18/2023    08:31   Common Labs   Glucose 105    BUN 13    Creatinine 0.75    Sodium 144    Potassium 4.2    Chloride 105    Calcium 9.6    Total Protein 6.7    Albumin 4.3    Total Bilirubin 0.2    Alkaline Phosphatase 129    AST (SGOT) 17    ALT (SGPT) 13    WBC 6.94    Hemoglobin 11.8    Hematocrit 37.4    Platelets 229    Total Cholesterol 211    Triglycerides 189    HDL Cholesterol 50    LDL Cholesterol  128    Hemoglobin A1C 6.20    Uric Acid 5.7                   Assessment and Plan   Diagnoses and all orders for this visit:    1. Acute cough (Primary)  -     POCT SARS-CoV-2 Antigen ROSALIND + Flu  -     POCT rapid strep A    2. Sore throat  -     POCT SARS-CoV-2 Antigen ROSALIND + Flu  -     POCT rapid strep A  -     clindamycin (Cleocin) 300 MG capsule; Take 1 capsule by mouth 3 (Three) Times a Day for 7 days.  Dispense: 21 capsule; Refill: 0    3. Acute URI  -     POCT SARS-CoV-2 Antigen ROSALIND + Flu  -     POCT rapid strep A  -     clindamycin (Cleocin) 300 MG capsule; Take 1 capsule by mouth 3 (Three) Times a Day for 7 days.  Dispense: 21 capsule; Refill: 0    4. Environmental and seasonal allergies  -     fluticasone (FLONASE) 50 MCG/ACT nasal spray; 2 sprays into the nostril(s) as directed by provider Daily.  Dispense: 18.2 mL; Refill: 1  -     fexofenadine (ALLEGRA) 180 MG tablet; Take 1 tablet by mouth Daily.  Dispense: 30 tablet; Refill: 5    Instructed patient to Return to Office as needed for persistent or new symptoms and/or go to ER for worsening symptoms, patient voiced understanding.            Follow Up   Return in about 2 weeks (around 10/25/2023).  Patient was given instructions and counseling regarding her condition or for  health maintenance advice. Please see specific information pulled into the AVS if appropriate.

## 2023-10-13 PROBLEM — J30.89 ENVIRONMENTAL AND SEASONAL ALLERGIES: Status: ACTIVE | Noted: 2023-10-13

## 2023-10-25 ENCOUNTER — OFFICE VISIT (OUTPATIENT)
Dept: FAMILY MEDICINE CLINIC | Facility: CLINIC | Age: 63
End: 2023-10-25
Payer: COMMERCIAL

## 2023-10-25 VITALS
BODY MASS INDEX: 47.7 KG/M2 | WEIGHT: 259.2 LBS | TEMPERATURE: 98.2 F | OXYGEN SATURATION: 97 % | DIASTOLIC BLOOD PRESSURE: 92 MMHG | HEART RATE: 75 BPM | SYSTOLIC BLOOD PRESSURE: 140 MMHG | HEIGHT: 62 IN

## 2023-10-25 DIAGNOSIS — R53.83 OTHER FATIGUE: ICD-10-CM

## 2023-10-25 DIAGNOSIS — R06.00 DYSPNEA, UNSPECIFIED TYPE: Primary | ICD-10-CM

## 2023-10-25 DIAGNOSIS — E06.3 HYPOTHYROIDISM DUE TO HASHIMOTO'S THYROIDITIS: ICD-10-CM

## 2023-10-25 DIAGNOSIS — J30.89 ENVIRONMENTAL AND SEASONAL ALLERGIES: ICD-10-CM

## 2023-10-25 DIAGNOSIS — E11.8 CONTROLLED TYPE 2 DIABETES MELLITUS WITH COMPLICATION, WITHOUT LONG-TERM CURRENT USE OF INSULIN: ICD-10-CM

## 2023-10-25 DIAGNOSIS — E55.9 VITAMIN D DEFICIENCY: ICD-10-CM

## 2023-10-25 DIAGNOSIS — E03.8 HYPOTHYROIDISM DUE TO HASHIMOTO'S THYROIDITIS: ICD-10-CM

## 2023-10-25 DIAGNOSIS — I10 PRIMARY HYPERTENSION: ICD-10-CM

## 2023-10-25 DIAGNOSIS — E78.00 PURE HYPERCHOLESTEROLEMIA: ICD-10-CM

## 2023-10-25 PROCEDURE — 99214 OFFICE O/P EST MOD 30 MIN: CPT | Performed by: NURSE PRACTITIONER

## 2023-10-25 NOTE — PROGRESS NOTES
"Chief Complaint  Hypertension (Follow up/)    Subjective        Laura Cerda presents to [facility]  History of Present Illness  Patient presents to the office today for a follow up on hypertension.  Blood pressure today 140/92.  With hypertension working on low-salt diet and exercise.  With hyperlipidemia working on healthy diet and exercise.  Checking lipid panel today.  She is taking atorvastatin 20 mg regularly.  A1c has been controlled.  We will check A1c today.  With anxiety taking Zoloft 20 mg denies suicidal homicidal ideation.  Today she is reporting of increased episodes of dyspnea.  I would like to further evaluate with chest x-ray and PFT            .    Objective   Vital Signs:  /92 (BP Location: Right arm, Patient Position: Sitting, Cuff Size: Large Adult)   Pulse 75   Temp 98.2 °F (36.8 °C)   Ht 157.5 cm (62.01\")   Wt 118 kg (259 lb 3.2 oz)   SpO2 97%   BMI 47.40 kg/m²   Estimated body mass index is 47.4 kg/m² as calculated from the following:    Height as of this encounter: 157.5 cm (62.01\").    Weight as of this encounter: 118 kg (259 lb 3.2 oz).       Class 3 Severe Obesity (BMI >=40). Obesity-related health conditions include the following: diabetes mellitus and dyslipidemias. Obesity is unchanged. BMI is is above average; BMI management plan is completed. We discussed portion control and increasing exercise.      Physical Exam  Constitutional:       General: She is not in acute distress.     Appearance: Normal appearance.   HENT:      Head: Normocephalic.   Eyes:      Pupils: Pupils are equal, round, and reactive to light.   Cardiovascular:      Rate and Rhythm: Normal rate.      Pulses: Normal pulses.      Heart sounds: Normal heart sounds.   Pulmonary:      Effort: Pulmonary effort is normal.      Breath sounds: Examination of the right-lower field reveals decreased breath sounds. Examination of the left-lower field reveals decreased breath sounds. " Decreased breath sounds present.   Musculoskeletal:         General: Normal range of motion.      Cervical back: Normal range of motion and neck supple.   Skin:     General: Skin is warm.   Neurological:      General: No focal deficit present.      Mental Status: She is alert and oriented to person, place, and time.   Psychiatric:         Mood and Affect: Mood normal.         Behavior: Behavior normal.         Thought Content: Thought content normal.         Judgment: Judgment normal.        Result Review :  The following data was reviewed by: JYOTI Harrington on 10/25/2023:  Common labs          4/18/2023    08:31 10/25/2023    10:14   Common Labs   Glucose 105  108    BUN 13  12    Creatinine 0.75  0.93    Sodium 144  141    Potassium 4.2  4.8    Chloride 105  102    Calcium 9.6  10.1    Total Protein 6.7  7.4    Albumin 4.3  4.5    Total Bilirubin 0.2  0.4    Alkaline Phosphatase 129  164    AST (SGOT) 17  32    ALT (SGPT) 13  30    WBC 6.94  8.09    Hemoglobin 11.8  13.0    Hematocrit 37.4  41.4    Platelets 229  231    Total Cholesterol 211  202    Triglycerides 189  136    HDL Cholesterol 50  59    LDL Cholesterol  128  119    Hemoglobin A1C 6.20  6.50    Uric Acid 5.7                    Assessment and Plan   Diagnoses and all orders for this visit:    1. Dyspnea, unspecified type (Primary)  -     XR Chest PA & Lateral; Future  -     Cancel: PFT / Spirometry - Order Using Outpatient Pulmonary Function Testing SmartSet  -     Spirometry - Pre & Post Bronchodilator; Future    2. Other fatigue  -     Vitamin B12  -     Vitamin D 25 hydroxy  -     CBC w AUTO Differential    3. Environmental and seasonal allergies    4. Primary hypertension  -     Comprehensive metabolic panel    5. Pure hypercholesterolemia  -     Lipid panel    6. Controlled type 2 diabetes mellitus with complication, without long-term current use of insulin  -     Hemoglobin A1c  -     Comprehensive metabolic panel    7. Hypothyroidism  due to Hashimoto's thyroiditis  -     Thyroid Panel With TSH    8. Vitamin D deficiency           I spent 30 minutes caring for Laura on this date of service. This time includes time spent by me in the following activities:preparing for the visit, reviewing tests, obtaining and/or reviewing a separately obtained history, performing a medically appropriate examination and/or evaluation , counseling and educating the patient/family/caregiver, ordering medications, tests, or procedures, documenting information in the medical record, independently interpreting results and communicating that information with the patient/family/caregiver, and care coordination  Follow Up   Return in about 11 weeks (around 1/10/2024) for Medicare Wellness.  Patient was given instructions and counseling regarding her condition or for health maintenance advice. Please see specific information pulled into the AVS if appropriate.

## 2023-10-26 LAB
25(OH)D3+25(OH)D2 SERPL-MCNC: 48.5 NG/ML (ref 30–100)
ALBUMIN SERPL-MCNC: 4.5 G/DL (ref 3.5–5.2)
ALBUMIN/GLOB SERPL: 1.6 G/DL
ALP SERPL-CCNC: 164 U/L (ref 39–117)
ALT SERPL-CCNC: 30 U/L (ref 1–33)
AST SERPL-CCNC: 32 U/L (ref 1–32)
BASOPHILS # BLD AUTO: 0.08 10*3/MM3 (ref 0–0.2)
BASOPHILS NFR BLD AUTO: 1 % (ref 0–1.5)
BILIRUB SERPL-MCNC: 0.4 MG/DL (ref 0–1.2)
BUN SERPL-MCNC: 12 MG/DL (ref 8–23)
BUN/CREAT SERPL: 12.9 (ref 7–25)
CALCIUM SERPL-MCNC: 10.1 MG/DL (ref 8.6–10.5)
CHLORIDE SERPL-SCNC: 102 MMOL/L (ref 98–107)
CHOLEST SERPL-MCNC: 202 MG/DL (ref 0–200)
CO2 SERPL-SCNC: 26.8 MMOL/L (ref 22–29)
CREAT SERPL-MCNC: 0.93 MG/DL (ref 0.57–1)
EGFRCR SERPLBLD CKD-EPI 2021: 68.8 ML/MIN/1.73
EOSINOPHIL # BLD AUTO: 0.36 10*3/MM3 (ref 0–0.4)
EOSINOPHIL NFR BLD AUTO: 4.4 % (ref 0.3–6.2)
ERYTHROCYTE [DISTWIDTH] IN BLOOD BY AUTOMATED COUNT: 14.7 % (ref 12.3–15.4)
FT4I SERPL CALC-MCNC: 2.1 (ref 1.2–4.9)
GLOBULIN SER CALC-MCNC: 2.9 GM/DL
GLUCOSE SERPL-MCNC: 108 MG/DL (ref 65–99)
HBA1C MFR BLD: 6.5 % (ref 4.8–5.6)
HCT VFR BLD AUTO: 41.4 % (ref 34–46.6)
HDLC SERPL-MCNC: 59 MG/DL (ref 40–60)
HGB BLD-MCNC: 13 G/DL (ref 12–15.9)
IMM GRANULOCYTES # BLD AUTO: 0.03 10*3/MM3 (ref 0–0.05)
IMM GRANULOCYTES NFR BLD AUTO: 0.4 % (ref 0–0.5)
LDLC SERPL CALC-MCNC: 119 MG/DL (ref 0–100)
LYMPHOCYTES # BLD AUTO: 1.94 10*3/MM3 (ref 0.7–3.1)
LYMPHOCYTES NFR BLD AUTO: 24 % (ref 19.6–45.3)
MCH RBC QN AUTO: 25.9 PG (ref 26.6–33)
MCHC RBC AUTO-ENTMCNC: 31.4 G/DL (ref 31.5–35.7)
MCV RBC AUTO: 82.5 FL (ref 79–97)
MONOCYTES # BLD AUTO: 0.6 10*3/MM3 (ref 0.1–0.9)
MONOCYTES NFR BLD AUTO: 7.4 % (ref 5–12)
NEUTROPHILS # BLD AUTO: 5.08 10*3/MM3 (ref 1.7–7)
NEUTROPHILS NFR BLD AUTO: 62.8 % (ref 42.7–76)
NRBC BLD AUTO-RTO: 0 /100 WBC (ref 0–0.2)
PLATELET # BLD AUTO: 231 10*3/MM3 (ref 140–450)
POTASSIUM SERPL-SCNC: 4.8 MMOL/L (ref 3.5–5.2)
PROT SERPL-MCNC: 7.4 G/DL (ref 6–8.5)
RBC # BLD AUTO: 5.02 10*6/MM3 (ref 3.77–5.28)
SODIUM SERPL-SCNC: 141 MMOL/L (ref 136–145)
T3RU NFR SERPL: 28 % (ref 24–39)
T4 SERPL-MCNC: 7.6 UG/DL (ref 4.5–12)
TRIGL SERPL-MCNC: 136 MG/DL (ref 0–150)
TSH SERPL DL<=0.005 MIU/L-ACNC: 3.09 UIU/ML (ref 0.45–4.5)
VIT B12 SERPL-MCNC: 457 PG/ML (ref 211–946)
VLDLC SERPL CALC-MCNC: 24 MG/DL (ref 5–40)
WBC # BLD AUTO: 8.09 10*3/MM3 (ref 3.4–10.8)

## 2023-11-16 ENCOUNTER — HOSPITAL ENCOUNTER (OUTPATIENT)
Dept: RESPIRATORY THERAPY | Facility: HOSPITAL | Age: 63
Discharge: HOME | End: 2023-11-16
Payer: COMMERCIAL

## 2023-11-16 ENCOUNTER — HOSPITAL ENCOUNTER (OUTPATIENT)
Dept: GENERAL RADIOLOGY | Facility: HOSPITAL | Age: 63
Discharge: HOME | End: 2023-11-16
Payer: COMMERCIAL

## 2023-11-16 DIAGNOSIS — R06.00 DYSPNEA, UNSPECIFIED TYPE: ICD-10-CM

## 2023-11-16 PROCEDURE — 94640 AIRWAY INHALATION TREATMENT: CPT

## 2023-11-16 PROCEDURE — 94060 EVALUATION OF WHEEZING: CPT

## 2023-11-16 PROCEDURE — 71046 X-RAY EXAM CHEST 2 VIEWS: CPT

## 2023-11-16 RX ORDER — ALBUTEROL SULFATE 2.5 MG/3ML
2.5 SOLUTION RESPIRATORY (INHALATION) ONCE
Status: COMPLETED | OUTPATIENT
Start: 2023-11-16 | End: 2023-11-16

## 2023-11-16 RX ADMIN — ALBUTEROL SULFATE 2.5 MG: 2.5 SOLUTION RESPIRATORY (INHALATION) at 09:58

## 2023-11-17 ENCOUNTER — TELEPHONE (OUTPATIENT)
Dept: FAMILY MEDICINE CLINIC | Facility: CLINIC | Age: 63
End: 2023-11-17
Payer: COMMERCIAL

## 2023-11-17 DIAGNOSIS — R91.1 PULMONARY NODULE: Primary | ICD-10-CM

## 2023-11-17 NOTE — TELEPHONE ENCOUNTER
Discussed abnormal  chest xray result with patient based on family history of lung disease will further proceed with CT chest

## 2023-12-08 ENCOUNTER — HOSPITAL ENCOUNTER (OUTPATIENT)
Dept: CT IMAGING | Facility: HOSPITAL | Age: 63
Discharge: HOME | End: 2023-12-08
Admitting: NURSE PRACTITIONER
Payer: COMMERCIAL

## 2023-12-08 DIAGNOSIS — R91.1 PULMONARY NODULE: ICD-10-CM

## 2023-12-08 PROCEDURE — 71260 CT THORAX DX C+: CPT

## 2023-12-08 PROCEDURE — 25510000001 IOPAMIDOL 61 % SOLUTION: Performed by: NURSE PRACTITIONER

## 2023-12-08 PROCEDURE — 82565 ASSAY OF CREATININE: CPT

## 2023-12-08 RX ADMIN — IOPAMIDOL 75 ML: 612 INJECTION, SOLUTION INTRAVENOUS at 14:13

## 2023-12-09 LAB — CREAT BLDA-MCNC: 0.9 MG/DL (ref 0.6–1.3)

## 2023-12-13 ENCOUNTER — TELEPHONE (OUTPATIENT)
Dept: FAMILY MEDICINE CLINIC | Facility: CLINIC | Age: 63
End: 2023-12-13
Payer: COMMERCIAL

## 2023-12-13 DIAGNOSIS — R91.8 ABNORMAL CT LUNG SCREENING: Primary | ICD-10-CM

## 2023-12-13 RX ORDER — DOXYCYCLINE HYCLATE 100 MG/1
100 CAPSULE ORAL 2 TIMES DAILY
Qty: 14 CAPSULE | Refills: 0 | Status: SHIPPED | OUTPATIENT
Start: 2023-12-13

## 2023-12-13 NOTE — TELEPHONE ENCOUNTER
Caller: Laura Cerda    Relationship: Self    Best call back number: [phone]     Caller requesting test results: CT TEST RESULT OF HER CHEST    What test was performed: CT SCAN    When was the test performed: 12/8/2023      Additional notes:       PATIENT WAS TOLD THE RESULTS WOULD BE BACK BY TUESDAY.

## 2023-12-26 RX ORDER — EMPAGLIFLOZIN 10 MG/1
10 TABLET, FILM COATED ORAL DAILY
Qty: 90 TABLET | Refills: 0 | Status: SHIPPED | OUTPATIENT
Start: 2023-12-26

## 2024-01-14 DIAGNOSIS — E06.3 HYPOTHYROIDISM DUE TO HASHIMOTO'S THYROIDITIS: ICD-10-CM

## 2024-01-14 DIAGNOSIS — E03.8 HYPOTHYROIDISM DUE TO HASHIMOTO'S THYROIDITIS: ICD-10-CM

## 2024-01-15 RX ORDER — LEVOTHYROXINE SODIUM 75 UG/1
75 TABLET ORAL DAILY
Qty: 90 TABLET | Refills: 1 | Status: SHIPPED | OUTPATIENT
Start: 2024-01-15

## 2024-01-24 DIAGNOSIS — J30.89 ENVIRONMENTAL AND SEASONAL ALLERGIES: ICD-10-CM

## 2024-01-24 RX ORDER — FLUTICASONE PROPIONATE 50 MCG
SPRAY, SUSPENSION (ML) NASAL
Qty: 16 ML | Refills: 1 | Status: SHIPPED | OUTPATIENT
Start: 2024-01-24

## 2024-01-29 ENCOUNTER — TRANSCRIBE ORDERS (OUTPATIENT)
Dept: ADMINISTRATIVE | Facility: HOSPITAL | Age: 64
End: 2024-01-29
Payer: COMMERCIAL

## 2024-01-29 DIAGNOSIS — R91.8 GROUND GLASS OPACITY PRESENT ON IMAGING OF LUNG: Primary | ICD-10-CM

## 2024-01-29 DIAGNOSIS — R91.1 NODULE OF LOWER LOBE OF RIGHT LUNG: ICD-10-CM

## 2024-01-30 ENCOUNTER — OFFICE VISIT (OUTPATIENT)
Dept: FAMILY MEDICINE CLINIC | Facility: CLINIC | Age: 64
End: 2024-01-30
Payer: MEDICARE

## 2024-01-30 VITALS
SYSTOLIC BLOOD PRESSURE: 132 MMHG | TEMPERATURE: 97.1 F | OXYGEN SATURATION: 98 % | BODY MASS INDEX: 46.85 KG/M2 | DIASTOLIC BLOOD PRESSURE: 82 MMHG | HEIGHT: 62 IN | HEART RATE: 82 BPM | WEIGHT: 254.6 LBS

## 2024-01-30 DIAGNOSIS — E11.8 CONTROLLED TYPE 2 DIABETES MELLITUS WITH COMPLICATION, WITHOUT LONG-TERM CURRENT USE OF INSULIN: ICD-10-CM

## 2024-01-30 DIAGNOSIS — E06.3 HYPOTHYROIDISM DUE TO HASHIMOTO'S THYROIDITIS: ICD-10-CM

## 2024-01-30 DIAGNOSIS — E78.00 PURE HYPERCHOLESTEROLEMIA: ICD-10-CM

## 2024-01-30 DIAGNOSIS — E03.8 HYPOTHYROIDISM DUE TO HASHIMOTO'S THYROIDITIS: ICD-10-CM

## 2024-01-30 DIAGNOSIS — Z00.00 WELCOME TO MEDICARE PREVENTIVE VISIT: Primary | ICD-10-CM

## 2024-01-30 DIAGNOSIS — Z71.3 WEIGHT LOSS COUNSELING, ENCOUNTER FOR: ICD-10-CM

## 2024-01-30 DIAGNOSIS — J30.89 ENVIRONMENTAL AND SEASONAL ALLERGIES: ICD-10-CM

## 2024-01-30 DIAGNOSIS — I10 PRIMARY HYPERTENSION: ICD-10-CM

## 2024-01-30 NOTE — PROGRESS NOTES
The ABCs of the Annual Wellness Visit  [place] to [place] Visit    Subjective     Laura Cerda is a 64 y.o. female who presents for a  Welcome to Medicare Visit.    The following portions of the patient's history were reviewed and   updated as appropriate: allergies, current medications, past family history, past medical history, past social history, past surgical history, and problem list.     Compared to one year ago, the patient feels her physical   health is the same.    Compared to one year ago, the patient feels her mental   health is the same.    Recent Hospitalizations:  She was not admitted to the hospital during the last year.       Current Medical Providers:  Patient Care Team:  Victorina Ennis APRN as PCP - General (Nurse Practitioner)  Hanna Contreras MD as Consulting Physician (Rheumatology)  Aurelio Edwards MD as Consulting Physician (Orthopedic Surgery)  Joanne Monteiro PA as Physician Assistant (Obstetrics and Gynecology)  Dottie Orantes MD as Gynecologist (Gynecology)    Outpatient Medications Prior to Visit   Medication Sig Dispense Refill    acetaminophen (TYLENOL) 500 MG tablet Take 1 tablet by mouth As Needed for Mild Pain.      allopurinol (ZYLOPRIM) 100 MG tablet TAKE 1 TABLET BY MOUTH EVERY DAY 90 tablet 1    atorvastatin (LIPITOR) 20 MG tablet Take 1 tablet by mouth Every Night. 90 tablet 1    fexofenadine (ALLEGRA) 180 MG tablet Take 1 tablet by mouth Daily. 30 tablet 5    fluticasone (FLONASE) 50 MCG/ACT nasal spray SPRAY 2 SPRAYS INTO THE NOSTRIL AS DIRECTED BY PROVIDER DAILY. 16 mL 1    Jardiance 10 MG tablet tablet TAKE 1 TABLET BY MOUTH EVERY DAY 90 tablet 0    Multiple Vitamins-Minerals (MULTIVITAMIN ADULT PO) Take 1 tablet by mouth Daily.      pantoprazole (PROTONIX) 40 MG EC tablet TAKE 1 TABLET BY MOUTH EVERY DAY 90 tablet 3    sertraline (ZOLOFT) 50 MG tablet TAKE 1 TABLET BY MOUTH EVERY DAY IN THE EVENING 90 tablet 0    Skyrizi Pen 150 MG/ML solution  auto-injector       tiZANidine (ZANAFLEX) 2 MG tablet Take 1 tablet by mouth At Night As Needed for Muscle Spasms.      Unithroid 75 MCG tablet TAKE 1 TABLET BY MOUTH EVERY DAY 90 tablet 1    vitamin D (ERGOCALCIFEROL) 1.25 MG (54141 UT) capsule capsule 1 po qweek 24 capsule 1    doxycycline (VIBRAMYCIN) 100 MG capsule Take 1 capsule by mouth 2 (Two) Times a Day. (Patient not taking: Reported on 1/30/2024) 14 capsule 0    vitamin E 1000 UNIT capsule Take 1 capsule by mouth Daily. (Patient not taking: Reported on 1/30/2024)       No facility-administered medications prior to visit.       No opioid medication identified on active medication list. I have reviewed chart for other potential  high risk medication/s and harmful drug interactions in the elderly.        Aspirin is not on active medication list.  Aspirin use is not indicated based on review of current medical condition/s. Risk of harm outweighs potential benefits.  .    Patient Active Problem List   Diagnosis    Fibromyalgia    Hypothyroid    Hyperlipidemia    Meniere's disease    Morbid obesity due to excess calories    Psoriatic arthritis    GERD (gastroesophageal reflux disease)    Hypertension    Elevated PTHrP level    Vitamin D deficiency    Hyperglycemia    Other fatigue    Primary hyperparathyroidism    Constipation    Dysphagia    Diarrhea    Polyp of colon    Ulcer of esophagus without bleeding    Prediabetes    Controlled type 2 diabetes mellitus with complication, without long-term current use of insulin    Hyperuricemia    Low back pain    Chronic gout of multiple sites    Diverticulosis    COVID-19 virus detected    Fatty liver    Diverticulitis    Hospital discharge follow-up    Acute pain of left shoulder    Anemia    History of colon polyps    Weight loss counseling, encounter for    Acute cough    Sore throat    Acute URI    Environmental and seasonal allergies    Dyspnea    Pulmonary nodule     Advance Care Planning   Advance Care  "Planning     Advance Directive is not on file.  ACP discussion was held with the patient during this visit. Patient has an advance directive (not in EMR), copy requested.       Objective   Vitals:    01/30/24 1305   BP: 132/82   BP Location: Left arm   Patient Position: Sitting   Cuff Size: Adult   Pulse: 82   Temp: 97.1 °F (36.2 °C)   TempSrc: Temporal   SpO2: 98%   Weight: 115 kg (254 lb 9.6 oz)   Height: 157.5 cm (62.01\")   PainSc: 0-No pain     Estimated body mass index is 46.55 kg/m² as calculated from the following:    Height as of this encounter: 157.5 cm (62.01\").    Weight as of this encounter: 115 kg (254 lb 9.6 oz).           Does the patient have evidence of cognitive impairment?   No    Lab Results   Component Value Date    CHLPL 182 01/30/2024    TRIG 128 01/30/2024    HDL 61 01/30/2024    LDL 99 01/30/2024    VLDL 22 01/30/2024    HGBA1C 6.3 (H) 01/30/2024         ECG 12 Lead    Date/Time: 1/30/2024 2:06 PM  Performed by: Victorina Ennis APRN    Authorized by: Victorina Ennis APRN  Comparison: compared with previous ECG from 8/12/2022  Rhythm: sinus rhythm  Rate: normal  BPM: 73  Conduction: conduction normal  ST Segments: ST segments normal  QRS axis: normal  Other: no other findings    Clinical impression: normal ECG             HEALTH RISK ASSESSMENT    Smoking Status:  Social History     Tobacco Use   Smoking Status Never    Passive exposure: Never   Smokeless Tobacco Never     Alcohol Consumption:  Social History     Substance and Sexual Activity   Alcohol Use Never       Fall Risk Screen:    STEADI Fall Risk Assessment was completed, and patient is at LOW risk for falls.Assessment completed on:1/30/2024    Depression Screen:       1/30/2024     1:00 PM   PHQ-2/PHQ-9 Depression Screening   Little Interest or Pleasure in Doing Things 0-->not at all   Feeling Down, Depressed or Hopeless 0-->not at all   PHQ-9: Brief Depression Severity Measure Score 0       Health Habits and Functional and " Cognitive Screenin/30/2024     1:00 PM   Functional & Cognitive Status   Do you have difficulty preparing food and eating? No   Do you have difficulty bathing yourself, getting dressed or grooming yourself? No   Do you have difficulty using the toilet? No   Do you have difficulty moving around from place to place? No   Do you have trouble with steps or getting out of a bed or a chair? No   Current Diet Limited Junk Food   Dental Exam Up to date   Eye Exam Up to date        Eye Exam Comment Has as appt next month   Exercise (times per week) 2 times per week   Current Exercises Include Walking   Do you need help using the phone?  No   Are you deaf or do you have serious difficulty hearing?  No   Do you need help to go to places out of walking distance? No   Do you need help shopping? No   Do you need help preparing meals?  No   Do you need help with housework?  No   Do you need help with laundry? No   Do you need help taking your medications? No   Do you need help managing money? No   Do you ever drive or ride in a car without wearing a seat belt? No   Have you felt unusual stress, anger or loneliness in the last month? No   Who do you live with? Spouse   If you need help, do you have trouble finding someone available to you? No   Have you been bothered in the last four weeks by sexual problems? No   Do you have difficulty concentrating, remembering or making decisions? No       Visual Acuity:    No results found.    Age-appropriate Screening Schedule:  Refer to the list below for future screening recommendations based on patient's age, sex and/or medical conditions. Orders for these recommended tests are listed in the plan section. The patient has been provided with a written plan.    Health Maintenance   Topic Date Due    COVID-19 Vaccine (1) Never done    TDAP/TD VACCINES (1 - Tdap) Never done    ZOSTER VACCINE (1 of 2) Never done    HEPATITIS C SCREENING  Never done    DIABETIC FOOT EXAM  Never done     DIABETIC EYE EXAM  Never done    Pneumococcal Vaccine 65+ (2 of 2 - PPSV23 or PCV20) 05/13/2020    DXA SCAN  02/01/2021    INFLUENZA VACCINE  08/01/2023    HEMOGLOBIN A1C  07/30/2024    BMI FOLLOWUP  10/25/2024    ANNUAL WELLNESS VISIT  01/30/2025    LIPID PANEL  01/30/2025    URINE MICROALBUMIN  01/30/2025    MAMMOGRAM  02/23/2025    PAP SMEAR  02/23/2026    COLORECTAL CANCER SCREENING  05/11/2033        CMS Preventative Services Quick Reference  Risk Factors Identified During Encounter    Immunizations Discussed/Encouraged: Influenza, Prevnar 20 (Pneumococcal 20-valent conjugate), Shingrix, COVID19, and RSV (Respiratory Syncytial Virus)  Dental Screening Recommended  Vision Screening Recommended  The above risks/problems have been discussed with the patient.  Pertinent information has been shared with the patient in the After Visit Summary.    Follow Up:   Initial Medicare Visit in one year    An After Visit Summary and PPPS were made available to the patient.      Additional E&M Note during same encounter follows:  Patient has multiple medical problems which are significant and separately identifiable that require additional work above and beyond the Medicare Wellness Visit.      Chief Complaint  Medicare Wellness-Initial Visit    Subjective        HPI  Laura Cerda is also being seen today for  weight loss counseling.  She has been working on healthy diet and exercise without results.  BMI 46.  With weight loss options unavailable to prescribe at this time such as Wegovy and Saxenda.  I will refer for weight loss management    Review of Systems   Constitutional:  Negative for fatigue and fever.   HENT:  Negative for congestion.    Eyes:  Negative for discharge.   Respiratory:  Negative for shortness of breath, wheezing and stridor.    Cardiovascular:  Negative for chest pain, palpitations and leg swelling.   Gastrointestinal:  Negative for abdominal distention.   Endocrine: Negative for cold intolerance.  "  Genitourinary:  Negative for dysuria.   Musculoskeletal:  Negative for arthralgias.   Skin:  Negative for rash.   Allergic/Immunologic: Negative for environmental allergies.   Neurological:  Negative for dizziness.   Hematological:  Negative for adenopathy.   Psychiatric/Behavioral:  Negative for agitation.        Objective   Vital Signs:  /82 (BP Location: Left arm, Patient Position: Sitting, Cuff Size: Adult)   Pulse 82   Temp 97.1 °F (36.2 °C) (Temporal)   Ht 157.5 cm (62.01\")   Wt 115 kg (254 lb 9.6 oz)   SpO2 98%   BMI 46.55 kg/m²     Physical Exam  Constitutional:       General: She is not in acute distress.     Appearance: Normal appearance. She is obese.   HENT:      Head: Normocephalic.      Right Ear: Tympanic membrane, ear canal and external ear normal. There is no impacted cerumen.      Left Ear: Tympanic membrane, ear canal and external ear normal. There is no impacted cerumen.      Nose: Nose normal.   Eyes:      Pupils: Pupils are equal, round, and reactive to light.   Cardiovascular:      Rate and Rhythm: Normal rate.      Pulses: Normal pulses.      Heart sounds: Normal heart sounds.   Pulmonary:      Effort: Pulmonary effort is normal.      Breath sounds: Normal breath sounds.   Abdominal:      General: Bowel sounds are normal.      Palpations: Abdomen is soft.   Musculoskeletal:         General: Normal range of motion.      Cervical back: Normal range of motion and neck supple.   Skin:     General: Skin is warm.   Neurological:      General: No focal deficit present.      Mental Status: She is alert and oriented to person, place, and time.   Psychiatric:         Mood and Affect: Mood normal.         Behavior: Behavior normal.         Thought Content: Thought content normal.         Judgment: Judgment normal.          The following data was reviewed by: JYOTI Harrington on 01/30/2024:  Common labs          10/25/2023    10:14 12/8/2023    14:05 1/30/2024    14:26   Common Labs "   Glucose 108   95    BUN 12   14    Creatinine 0.93  0.90  0.93    Sodium 141   141    Potassium 4.8   4.5    Chloride 102   99    Calcium 10.1   9.8    Total Protein 7.4   7.4    Albumin 4.5   4.5    Total Bilirubin 0.4   0.4    Alkaline Phosphatase 164   167    AST (SGOT) 32   39    ALT (SGPT) 30   40    WBC 8.09   10.8    Hemoglobin 13.0   13.6    Hematocrit 41.4   43.4    Platelets 231   212    Total Cholesterol 202   182    Triglycerides 136   128    HDL Cholesterol 59   61    LDL Cholesterol  119   99    Hemoglobin A1C 6.50   6.3    Microalbumin, Urine   4.1              Assessment and Plan   Diagnoses and all orders for this visit:    1. Welcome to Medicare preventive visit (Primary)  -     ECG 12 Lead    2. Pure hypercholesterolemia  -     Lipid Panel    3. Environmental and seasonal allergies  -     CBC & Differential    4. Primary hypertension  -     Comprehensive Metabolic Panel    5. Hypothyroidism due to Hashimoto's thyroiditis  -     Thyroid Panel With TSH    6. Controlled type 2 diabetes mellitus with complication, without long-term current use of insulin  -     Hemoglobin A1c  -     MicroAlbumin, Urine, Random - Urine, Clean Catch    7. Weight loss counseling, encounter for  -     Ambulatory Referral to Nutrition Services      Counseling was provided on nutrition, physical activity, development, and injury prevention, dental health, and safe sex practices patient verbalizes understanding no additional questions were asked.         I spent 30 minutes caring for Laura on this date of service. This time includes time spent by me in the following activities:preparing for the visit, reviewing tests, obtaining and/or reviewing a separately obtained history, performing a medically appropriate examination and/or evaluation , counseling and educating the patient/family/caregiver, ordering medications, tests, or procedures, referring and communicating with other health care professionals , documenting  information in the medical record, independently interpreting results and communicating that information with the patient/family/caregiver, and care coordination  Follow Up   Return in about 3 months (around 4/30/2024) for Recheck.  Patient was given instructions and counseling regarding her condition or for health maintenance advice. Please see specific information pulled into the AVS if appropriate.

## 2024-01-31 LAB
ALBUMIN SERPL-MCNC: 4.5 G/DL (ref 3.9–4.9)
ALBUMIN/GLOB SERPL: 1.6 {RATIO} (ref 1.2–2.2)
ALP SERPL-CCNC: 167 IU/L (ref 44–121)
ALT SERPL-CCNC: 40 IU/L (ref 0–32)
AST SERPL-CCNC: 39 IU/L (ref 0–40)
BASOPHILS # BLD AUTO: 0.1 X10E3/UL (ref 0–0.2)
BASOPHILS NFR BLD AUTO: 1 %
BILIRUB SERPL-MCNC: 0.4 MG/DL (ref 0–1.2)
BUN SERPL-MCNC: 14 MG/DL (ref 8–27)
BUN/CREAT SERPL: 15 (ref 12–28)
CALCIUM SERPL-MCNC: 9.8 MG/DL (ref 8.7–10.3)
CHLORIDE SERPL-SCNC: 99 MMOL/L (ref 96–106)
CHOLEST SERPL-MCNC: 182 MG/DL (ref 100–199)
CO2 SERPL-SCNC: 25 MMOL/L (ref 20–29)
CREAT SERPL-MCNC: 0.93 MG/DL (ref 0.57–1)
EGFRCR SERPLBLD CKD-EPI 2021: 68 ML/MIN/1.73
EOSINOPHIL # BLD AUTO: 0.4 X10E3/UL (ref 0–0.4)
EOSINOPHIL NFR BLD AUTO: 3 %
ERYTHROCYTE [DISTWIDTH] IN BLOOD BY AUTOMATED COUNT: 16.5 % (ref 11.7–15.4)
FT4I SERPL CALC-MCNC: 2.4 (ref 1.2–4.9)
GLOBULIN SER CALC-MCNC: 2.9 G/DL (ref 1.5–4.5)
GLUCOSE SERPL-MCNC: 95 MG/DL (ref 70–99)
HBA1C MFR BLD: 6.3 % (ref 4.8–5.6)
HCT VFR BLD AUTO: 43.4 % (ref 34–46.6)
HDLC SERPL-MCNC: 61 MG/DL
HGB BLD-MCNC: 13.6 G/DL (ref 11.1–15.9)
IMM GRANULOCYTES # BLD AUTO: 0 X10E3/UL (ref 0–0.1)
IMM GRANULOCYTES NFR BLD AUTO: 0 %
LDLC SERPL CALC-MCNC: 99 MG/DL (ref 0–99)
LYMPHOCYTES # BLD AUTO: 2.7 X10E3/UL (ref 0.7–3.1)
LYMPHOCYTES NFR BLD AUTO: 25 %
MCH RBC QN AUTO: 26.1 PG (ref 26.6–33)
MCHC RBC AUTO-ENTMCNC: 31.3 G/DL (ref 31.5–35.7)
MCV RBC AUTO: 83 FL (ref 79–97)
MICROALBUMIN UR-MCNC: 4.1 UG/ML
MONOCYTES # BLD AUTO: 0.7 X10E3/UL (ref 0.1–0.9)
MONOCYTES NFR BLD AUTO: 6 %
NEUTROPHILS # BLD AUTO: 7 X10E3/UL (ref 1.4–7)
NEUTROPHILS NFR BLD AUTO: 65 %
PLATELET # BLD AUTO: 212 X10E3/UL (ref 150–450)
POTASSIUM SERPL-SCNC: 4.5 MMOL/L (ref 3.5–5.2)
PROT SERPL-MCNC: 7.4 G/DL (ref 6–8.5)
RBC # BLD AUTO: 5.22 X10E6/UL (ref 3.77–5.28)
SODIUM SERPL-SCNC: 141 MMOL/L (ref 134–144)
T3RU NFR SERPL: 28 % (ref 24–39)
T4 SERPL-MCNC: 8.7 UG/DL (ref 4.5–12)
TRIGL SERPL-MCNC: 128 MG/DL (ref 0–149)
TSH SERPL DL<=0.005 MIU/L-ACNC: 1.91 UIU/ML (ref 0.45–4.5)
VLDLC SERPL CALC-MCNC: 22 MG/DL (ref 5–40)
WBC # BLD AUTO: 10.8 X10E3/UL (ref 3.4–10.8)

## 2024-02-01 PROBLEM — Z71.3 WEIGHT LOSS COUNSELING, ENCOUNTER FOR: Status: ACTIVE | Noted: 2023-04-25

## 2024-02-20 ENCOUNTER — HOSPITAL ENCOUNTER (OUTPATIENT)
Dept: DIABETES SERVICES | Facility: HOSPITAL | Age: 64
Discharge: HOME | End: 2024-02-20
Admitting: NURSE PRACTITIONER
Payer: MEDICARE

## 2024-02-20 PROCEDURE — 97802 MEDICAL NUTRITION INDIV IN: CPT

## 2024-02-20 NOTE — PROGRESS NOTES
Diabetes Education    Patient Name:  Laura Cerda  YOB: 1960  MRN: [id number]  Admit Date:  2/20/2024      Mrs. Cerda met with DEMAR GRAY for MNT.  A comprehensive assessment/training record has been sent to medical records (see media tab) to associate with this encounter.     Hemoglobin A1c = 6.3%      We discussed meal planning - she was advised to follow “MyPlate” method to plan meals. We discussed the importance of eating regular meals. We reviewed how to read food labels.  We reviewed portion sizes - we discussed importance of “balance meals/snacks”.  We reviewed a consistent carb diet and discussed importance of carb counting.  Patient verbalized understanding of all information reviewed at today’s visit.       Mrs. Cerda has been encouraged to call our office with questions or additional education needs. Please place referral for additional services or follow-up should need arise.    Thank you for the referral      Alijca Diaz RD, LUNA, MARINA    Electronically signed by:  Alicja Diaz RD  02/20/24 13:17 EST

## 2024-02-28 ENCOUNTER — HOSPITAL ENCOUNTER (OUTPATIENT)
Facility: HOSPITAL | Age: 64
Discharge: HOME | End: 2024-02-28
Admitting: PHYSICIAN ASSISTANT
Payer: MEDICARE

## 2024-02-28 ENCOUNTER — OFFICE VISIT (OUTPATIENT)
Dept: OBSTETRICS AND GYNECOLOGY | Age: 64
End: 2024-02-28
Payer: MEDICARE

## 2024-02-28 VITALS
SYSTOLIC BLOOD PRESSURE: 128 MMHG | DIASTOLIC BLOOD PRESSURE: 84 MMHG | HEIGHT: 62 IN | BODY MASS INDEX: 46.74 KG/M2 | WEIGHT: 254 LBS

## 2024-02-28 DIAGNOSIS — Z13.820 OSTEOPOROSIS SCREENING: ICD-10-CM

## 2024-02-28 DIAGNOSIS — Z12.31 VISIT FOR SCREENING MAMMOGRAM: ICD-10-CM

## 2024-02-28 DIAGNOSIS — Z01.419 WELL WOMAN EXAM WITH ROUTINE GYNECOLOGICAL EXAM: Primary | ICD-10-CM

## 2024-02-28 DIAGNOSIS — N95.8 OTHER SPECIFIED MENOPAUSAL AND PERIMENOPAUSAL DISORDERS: ICD-10-CM

## 2024-02-28 PROCEDURE — 77063 BREAST TOMOSYNTHESIS BI: CPT

## 2024-02-28 PROCEDURE — 77067 SCR MAMMO BI INCL CAD: CPT

## 2024-02-28 NOTE — PROGRESS NOTES
"Subjective     Chief Complaint   Patient presents with    Annual Exam     Annual exam last pap 2023 neg/neg, m/g after appointment (1040), dexa , colonoscopy        History of Present Illness    Laura Cerda is a 64 y.o.  who presents for annual exam.    She is good    Sees her pcp routinely  Has had routine labs done recently as wel  Her A1C was improved to 6.4%      Obstetric History:  OB History          2    Para   2    Term   2            AB        Living   2         SAB        IAB        Ectopic        Molar        Multiple        Live Births   2               Menstrual History:     No LMP recorded. Patient is postmenopausal.         Current contraception: post menopausal status  History of abnormal Pap smear: yes - , neg since, no longer needed  Received Gardasil immunization: no  Perform regular self breast exam: yes - occl  Family history of uterine or ovarian cancer: no  Family History of colon cancer: no  Family history of breast cancer: yes - mom    Mammogram: done today.  Colonoscopy: up to date.  DEXA: ordered.    Exercise: not active  Calcium/Vitamin D: adequate intake    The following portions of the patient's history were reviewed and updated as appropriate: allergies, current medications, past family history, past medical history, past social history, past surgical history, and problem list.    Review of Systems   All other systems reviewed and are negative.      Review of Systems   Constitutional: Negative for fatigue.   Respiratory: Negative for shortness of breath.    Gastrointestinal: Negative for abdominal pain.   Genitourinary: Negative for dysuria.   Neurological: Negative for headaches.   Psychiatric/Behavioral: Negative for dysphoric mood.         Objective   Physical Exam    /84   Ht 157.5 cm (62\")   Wt 115 kg (254 lb)   BMI 46.46 kg/m²   General:   alert, comfortable, and no distress   Heart: regular rate and rhythm   Lungs: clear to " auscultation bilaterally   Breast: normal appearance, no masses or tenderness, Inspection negative, No nipple retraction or dimpling, No nipple discharge or bleeding, No axillary or supraclavicular adenopathy, Normal to palpation without dominant masses   Neck: no adenopathy and no carotid bruit   Abdomen: normal findings: soft, non-tender   CVA: Not performed today   Pelvis: External genitalia: normal general appearance  Urinary system: urethral meatus normal  Vaginal: normal mucosa without prolapse or lesions and atrophic mucosa  Cervix: normal appearance  Adnexa: normal bimanual exam  Uterus: normal single, nontender  Exam limited by body habitus   Extremities: Not performed today   Neurologic: negative   Psychiatric: Normal affect, judgement, and mood     Assessment & Plan   Diagnoses and all orders for this visit:    1. Well woman exam with routine gynecological exam (Primary)    2. Osteoporosis screening  -     dexa bone density axial    3. Other specified menopausal and perimenopausal disorders  -     dexa bone density axial        All questions answered.  Breast self exam technique reviewed and patient encouraged to perform self-exam monthly.  Discussed healthy lifestyle modifications.  Recommended 30 minutes of aerobic exercise five times per week.  Discussed calcium needs to prevent osteoporosis.    Pap no longer needed  DEXA ordered   Mammogram today

## 2024-03-04 DIAGNOSIS — R92.8 ABNORMAL MAMMOGRAM: Primary | ICD-10-CM

## 2024-03-04 DIAGNOSIS — N63.20 MASS OF LEFT BREAST, UNSPECIFIED QUADRANT: ICD-10-CM

## 2024-03-04 RX ORDER — ALLOPURINOL 100 MG/1
TABLET ORAL
Qty: 90 TABLET | Refills: 1 | Status: SHIPPED | OUTPATIENT
Start: 2024-03-04

## 2024-03-07 ENCOUNTER — APPOINTMENT (OUTPATIENT)
Dept: WOMENS IMAGING | Facility: HOSPITAL | Age: 64
End: 2024-03-07
Payer: MEDICARE

## 2024-03-07 PROCEDURE — 77065 DX MAMMO INCL CAD UNI: CPT | Performed by: RADIOLOGY

## 2024-03-07 PROCEDURE — G0279 TOMOSYNTHESIS, MAMMO: HCPCS | Performed by: RADIOLOGY

## 2024-03-07 PROCEDURE — 76642 ULTRASOUND BREAST LIMITED: CPT | Performed by: RADIOLOGY

## 2024-03-12 DIAGNOSIS — N63.20 MASS OF LEFT BREAST, UNSPECIFIED QUADRANT: Primary | ICD-10-CM

## 2024-03-12 RX ORDER — LIDOCAINE HYDROCHLORIDE AND EPINEPHRINE 10; 10 MG/ML; UG/ML
20 INJECTION, SOLUTION INFILTRATION; PERINEURAL ONCE
OUTPATIENT
Start: 2024-03-15 | End: 2024-03-12

## 2024-03-15 ENCOUNTER — HOSPITAL ENCOUNTER (OUTPATIENT)
Facility: HOSPITAL | Age: 64
Discharge: HOME | End: 2024-03-15
Payer: MEDICARE

## 2024-03-15 PROCEDURE — 77080 DXA BONE DENSITY AXIAL: CPT

## 2024-03-25 ENCOUNTER — LAB REQUISITION (OUTPATIENT)
Dept: LAB | Facility: HOSPITAL | Age: 64
End: 2024-03-25
Payer: MEDICARE

## 2024-03-25 ENCOUNTER — APPOINTMENT (OUTPATIENT)
Dept: WOMENS IMAGING | Facility: HOSPITAL | Age: 64
End: 2024-03-25
Payer: MEDICARE

## 2024-03-25 DIAGNOSIS — N63.0 UNSPECIFIED LUMP IN UNSPECIFIED BREAST: ICD-10-CM

## 2024-03-25 PROCEDURE — 88305 TISSUE EXAM BY PATHOLOGIST: CPT | Performed by: PHYSICIAN ASSISTANT

## 2024-03-25 PROCEDURE — 19083 BX BREAST 1ST LESION US IMAG: CPT | Performed by: RADIOLOGY

## 2024-03-25 PROCEDURE — A4648 IMPLANTABLE TISSUE MARKER: HCPCS | Performed by: RADIOLOGY

## 2024-03-25 RX ORDER — EMPAGLIFLOZIN 10 MG/1
10 TABLET, FILM COATED ORAL DAILY
Qty: 90 TABLET | Refills: 0 | Status: SHIPPED | OUTPATIENT
Start: 2024-03-25

## 2024-03-26 LAB
DX PRELIMINARY: NORMAL
LAB AP CASE REPORT: NORMAL
PATH REPORT.FINAL DX SPEC: NORMAL
PATH REPORT.GROSS SPEC: NORMAL

## 2024-04-30 ENCOUNTER — OFFICE VISIT (OUTPATIENT)
Dept: FAMILY MEDICINE CLINIC | Facility: CLINIC | Age: 64
End: 2024-04-30
Payer: MEDICARE

## 2024-04-30 VITALS
SYSTOLIC BLOOD PRESSURE: 156 MMHG | TEMPERATURE: 97.7 F | HEIGHT: 62 IN | HEART RATE: 84 BPM | BODY MASS INDEX: 45.86 KG/M2 | DIASTOLIC BLOOD PRESSURE: 94 MMHG | OXYGEN SATURATION: 100 % | WEIGHT: 249.2 LBS

## 2024-04-30 DIAGNOSIS — H81.01 MENIERE'S DISEASE OF RIGHT EAR: ICD-10-CM

## 2024-04-30 DIAGNOSIS — E11.8 CONTROLLED TYPE 2 DIABETES MELLITUS WITH COMPLICATION, WITHOUT LONG-TERM CURRENT USE OF INSULIN: ICD-10-CM

## 2024-04-30 DIAGNOSIS — E06.3 HYPOTHYROIDISM DUE TO HASHIMOTO'S THYROIDITIS: ICD-10-CM

## 2024-04-30 DIAGNOSIS — F41.9 ANXIETY: ICD-10-CM

## 2024-04-30 DIAGNOSIS — E78.2 MIXED HYPERLIPIDEMIA: ICD-10-CM

## 2024-04-30 DIAGNOSIS — I10 PRIMARY HYPERTENSION: Primary | ICD-10-CM

## 2024-04-30 DIAGNOSIS — E03.8 HYPOTHYROIDISM DUE TO HASHIMOTO'S THYROIDITIS: ICD-10-CM

## 2024-04-30 DIAGNOSIS — M1A.09X0 IDIOPATHIC CHRONIC GOUT OF MULTIPLE SITES WITHOUT TOPHUS: ICD-10-CM

## 2024-04-30 DIAGNOSIS — K21.9 GASTROESOPHAGEAL REFLUX DISEASE WITHOUT ESOPHAGITIS: ICD-10-CM

## 2024-04-30 DIAGNOSIS — E55.9 VITAMIN D DEFICIENCY: ICD-10-CM

## 2024-04-30 PROCEDURE — 1126F AMNT PAIN NOTED NONE PRSNT: CPT | Performed by: NURSE PRACTITIONER

## 2024-04-30 PROCEDURE — 99214 OFFICE O/P EST MOD 30 MIN: CPT | Performed by: NURSE PRACTITIONER

## 2024-04-30 PROCEDURE — 1160F RVW MEDS BY RX/DR IN RCRD: CPT | Performed by: NURSE PRACTITIONER

## 2024-04-30 PROCEDURE — 1159F MED LIST DOCD IN RCRD: CPT | Performed by: NURSE PRACTITIONER

## 2024-04-30 PROCEDURE — 3080F DIAST BP >= 90 MM HG: CPT | Performed by: NURSE PRACTITIONER

## 2024-04-30 PROCEDURE — 3044F HG A1C LEVEL LT 7.0%: CPT | Performed by: NURSE PRACTITIONER

## 2024-04-30 PROCEDURE — 3077F SYST BP >= 140 MM HG: CPT | Performed by: NURSE PRACTITIONER

## 2024-04-30 RX ORDER — HYDROXYZINE HYDROCHLORIDE 25 MG/1
25 TABLET, FILM COATED ORAL EVERY 8 HOURS PRN
Qty: 90 TABLET | Refills: 0 | Status: SHIPPED | OUTPATIENT
Start: 2024-04-30

## 2024-05-01 LAB
25(OH)D3+25(OH)D2 SERPL-MCNC: 48.5 NG/ML (ref 30–100)
ALBUMIN SERPL-MCNC: 4.5 G/DL (ref 3.9–4.9)
ALBUMIN/GLOB SERPL: 1.5 {RATIO} (ref 1.2–2.2)
ALP SERPL-CCNC: 164 IU/L (ref 44–121)
ALT SERPL-CCNC: 29 IU/L (ref 0–32)
AST SERPL-CCNC: 33 IU/L (ref 0–40)
BASOPHILS # BLD AUTO: 0.1 X10E3/UL (ref 0–0.2)
BASOPHILS NFR BLD AUTO: 1 %
BILIRUB SERPL-MCNC: 0.4 MG/DL (ref 0–1.2)
BUN SERPL-MCNC: 14 MG/DL (ref 8–27)
BUN/CREAT SERPL: 15 (ref 12–28)
CALCIUM SERPL-MCNC: 9.9 MG/DL (ref 8.7–10.3)
CHLORIDE SERPL-SCNC: 101 MMOL/L (ref 96–106)
CHOLEST SERPL-MCNC: 197 MG/DL (ref 100–199)
CO2 SERPL-SCNC: 25 MMOL/L (ref 20–29)
CREAT SERPL-MCNC: 0.96 MG/DL (ref 0.57–1)
EGFRCR SERPLBLD CKD-EPI 2021: 66 ML/MIN/1.73
EOSINOPHIL # BLD AUTO: 0.3 X10E3/UL (ref 0–0.4)
EOSINOPHIL NFR BLD AUTO: 3 %
ERYTHROCYTE [DISTWIDTH] IN BLOOD BY AUTOMATED COUNT: 15.8 % (ref 11.7–15.4)
FT4I SERPL CALC-MCNC: 2.6 (ref 1.2–4.9)
GLOBULIN SER CALC-MCNC: 3.1 G/DL (ref 1.5–4.5)
GLUCOSE SERPL-MCNC: 96 MG/DL (ref 70–99)
HBA1C MFR BLD: 6.5 % (ref 4.8–5.6)
HCT VFR BLD AUTO: 44.4 % (ref 34–46.6)
HDLC SERPL-MCNC: 57 MG/DL
HGB BLD-MCNC: 13.8 G/DL (ref 11.1–15.9)
IMM GRANULOCYTES # BLD AUTO: 0 X10E3/UL (ref 0–0.1)
IMM GRANULOCYTES NFR BLD AUTO: 0 %
LDLC SERPL CALC-MCNC: 114 MG/DL (ref 0–99)
LYMPHOCYTES # BLD AUTO: 2.2 X10E3/UL (ref 0.7–3.1)
LYMPHOCYTES NFR BLD AUTO: 24 %
MCH RBC QN AUTO: 25.9 PG (ref 26.6–33)
MCHC RBC AUTO-ENTMCNC: 31.1 G/DL (ref 31.5–35.7)
MCV RBC AUTO: 84 FL (ref 79–97)
MICROALBUMIN UR-MCNC: 4.1 UG/ML
MONOCYTES # BLD AUTO: 0.5 X10E3/UL (ref 0.1–0.9)
MONOCYTES NFR BLD AUTO: 6 %
NEUTROPHILS # BLD AUTO: 6.2 X10E3/UL (ref 1.4–7)
NEUTROPHILS NFR BLD AUTO: 66 %
PLATELET # BLD AUTO: 204 X10E3/UL (ref 150–450)
POTASSIUM SERPL-SCNC: 4.9 MMOL/L (ref 3.5–5.2)
PROT SERPL-MCNC: 7.6 G/DL (ref 6–8.5)
RBC # BLD AUTO: 5.32 X10E6/UL (ref 3.77–5.28)
SODIUM SERPL-SCNC: 143 MMOL/L (ref 134–144)
T3RU NFR SERPL: 29 % (ref 24–39)
T4 SERPL-MCNC: 9 UG/DL (ref 4.5–12)
TRIGL SERPL-MCNC: 151 MG/DL (ref 0–149)
TSH SERPL DL<=0.005 MIU/L-ACNC: 1.85 UIU/ML (ref 0.45–4.5)
URATE SERPL-MCNC: 4.7 MG/DL (ref 3–7.2)
VLDLC SERPL CALC-MCNC: 26 MG/DL (ref 5–40)
WBC # BLD AUTO: 9.4 X10E3/UL (ref 3.4–10.8)

## 2024-05-08 RX ORDER — PANTOPRAZOLE SODIUM 40 MG/1
TABLET, DELAYED RELEASE ORAL
Qty: 90 TABLET | Refills: 3 | OUTPATIENT
Start: 2024-05-08

## 2024-05-14 ENCOUNTER — OFFICE VISIT (OUTPATIENT)
Dept: GASTROENTEROLOGY | Facility: CLINIC | Age: 64
End: 2024-05-14
Payer: MEDICARE

## 2024-05-14 VITALS
DIASTOLIC BLOOD PRESSURE: 87 MMHG | SYSTOLIC BLOOD PRESSURE: 147 MMHG | HEART RATE: 74 BPM | BODY MASS INDEX: 45.82 KG/M2 | TEMPERATURE: 97.2 F | WEIGHT: 249 LBS | OXYGEN SATURATION: 98 % | HEIGHT: 62 IN

## 2024-05-14 DIAGNOSIS — K21.9 GASTROESOPHAGEAL REFLUX DISEASE, UNSPECIFIED WHETHER ESOPHAGITIS PRESENT: Primary | ICD-10-CM

## 2024-05-14 DIAGNOSIS — R19.8 CHANGE IN BOWEL FUNCTION: ICD-10-CM

## 2024-05-14 PROCEDURE — 3077F SYST BP >= 140 MM HG: CPT | Performed by: INTERNAL MEDICINE

## 2024-05-14 PROCEDURE — 1159F MED LIST DOCD IN RCRD: CPT | Performed by: INTERNAL MEDICINE

## 2024-05-14 PROCEDURE — 1160F RVW MEDS BY RX/DR IN RCRD: CPT | Performed by: INTERNAL MEDICINE

## 2024-05-14 PROCEDURE — 99213 OFFICE O/P EST LOW 20 MIN: CPT | Performed by: INTERNAL MEDICINE

## 2024-05-14 PROCEDURE — 3079F DIAST BP 80-89 MM HG: CPT | Performed by: INTERNAL MEDICINE

## 2024-05-14 RX ORDER — PANTOPRAZOLE SODIUM 40 MG/1
40 TABLET, DELAYED RELEASE ORAL DAILY
Qty: 90 TABLET | Refills: 3 | Status: SHIPPED | OUTPATIENT
Start: 2024-05-14

## 2024-05-14 NOTE — PROGRESS NOTES
Chief Complaint   Patient presents with    Med Refill    Nausea    Diarrhea    Constipation        Laura Cerda is a  64 y.o. female here for a follow up visit for GERD, change in bowel function.    HPI this 64-year-old female patient of JYOTI Harrington presents since last seen in 2022.  She did undergo segmental bowel resection and 2023 and a colonoscopy performed in May of that year showed normal mucosa with random biopsies taken.  She states that her bowel pattern has improved since her surgery.  Her reflux also seems to be improved with the use of pantoprazole.  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  I advised we would continue her current regimen and follow-up in 6 months time but instructed her to call sooner if symptoms progress or worsen.  She is amenable to this.    Past Medical History:   Diagnosis Date    Abnormal Pap smear of cervix 1994?    Anemia during pregnancies    Arthritis     psoriatic    Asthma     sometimes    Cataract 2017    surgery    Chest pain     Cholelithiasis 2001    Chronic kidney disease     Colon polyp 2004    COVID-19 04/15/2022    Diabetes mellitus 2019    Diverticulitis of colon     Diverticulosis 2004    Resection colon surgery Sept.6th 2022    Elevated PTHrP level     Fatigue     Fatty liver 2022    Fibromyalgia     Fibromyalgia, primary     Foot swelling     GERD (gastroesophageal reflux disease)     Gout     Hernia 2oo1    History of Holter monitoring 08/22/2016    Hyperlipidemia     Hyperparathyroidism     Hypertension     Hypothyroidism     Joint pain     worsening    Meniere's disease     Nausea     Osteoarthritis     Ovarian cyst 2002 left ovary    shrink with birth control pills    Palpitations     PONV (postoperative nausea and vomiting)     Postmenopausal     Psoriatic arthritis     Rapid heart rate     Raynaud disease     Thyroid disease     Urinary tract infection     Varicella     Vitamin D deficiency        Current Outpatient  Medications   Medication Sig Dispense Refill    acetaminophen (TYLENOL) 500 MG tablet Take 1 tablet by mouth As Needed for Mild Pain.      allopurinol (ZYLOPRIM) 100 MG tablet TAKE 1 TABLET BY MOUTH EVERY DAY 90 tablet 1    atorvastatin (LIPITOR) 20 MG tablet Take 1 tablet by mouth Every Night. 90 tablet 1    fluticasone (FLONASE) 50 MCG/ACT nasal spray SPRAY 2 SPRAYS INTO THE NOSTRIL AS DIRECTED BY PROVIDER DAILY. 16 mL 1    hydrOXYzine (ATARAX) 25 MG tablet Take 1 tablet by mouth Every 8 (Eight) Hours As Needed for Anxiety (for sleep). 90 tablet 0    Jardiance 10 MG tablet tablet TAKE 1 TABLET BY MOUTH EVERY DAY 90 tablet 0    Multiple Vitamins-Minerals (MULTIVITAMIN ADULT PO) Take 1 tablet by mouth Daily.      pantoprazole (PROTONIX) 40 MG EC tablet Take 1 tablet by mouth Daily. 90 tablet 3    Secukinumab (COSENTYX IV) Infuse  into a venous catheter Every 30 (Thirty) Days.      sertraline (ZOLOFT) 50 MG tablet TAKE 1 TABLET BY MOUTH EVERY DAY IN THE EVENING 90 tablet 1    tiZANidine (ZANAFLEX) 2 MG tablet Take 1 tablet by mouth At Night As Needed for Muscle Spasms.      Unithroid 75 MCG tablet TAKE 1 TABLET BY MOUTH EVERY DAY 90 tablet 1    vitamin D (ERGOCALCIFEROL) 1.25 MG (81840 UT) capsule capsule 1 po qweek 24 capsule 1    fexofenadine (ALLEGRA) 180 MG tablet Take 1 tablet by mouth Daily. 30 tablet 5     No current facility-administered medications for this visit.       PRN Meds:.    Allergies   Allergen Reactions    Augmentin [Amoxicillin-Pot Clavulanate] Hives and Shortness Of Breath     Temperature high    Azithromycin Rash    Ceclor [Cefaclor] Rash    Lyrica [Pregabalin] Swelling    Metformin GI Intolerance    Penicillin G Other (See Comments)     Does not take because of augmentin       Social History     Socioeconomic History    Marital status:     Number of children: 2   Tobacco Use    Smoking status: Never     Passive exposure: Never    Smokeless tobacco: Never   Vaping Use    Vaping status:  Never Used   Substance and Sexual Activity    Alcohol use: Never    Drug use: Never    Sexual activity: Yes     Partners: Male     Birth control/protection: Post-menopausal, Tubal ligation     Comment: Tubal       Family History   Problem Relation Age of Onset    Heart failure Mother     Hypertension Mother     Diabetes Mother     Inflammatory bowel disease Mother     Diverticulitis Mother     Breast cancer Mother     Lung cancer Mother     Arthritis Mother     Asthma Mother     COPD Mother     Heart disease Mother     Stroke Mother         2020    Cancer Father         Bladder    Diabetes Father     Hearing loss Father     Pulmonary fibrosis Father     Arthritis Brother     Heart disease Maternal Grandmother     Stroke Maternal Grandmother     Inflammatory bowel disease Maternal Grandmother     Diverticulitis Maternal Grandmother     Heart disease Maternal Grandfather     Colon cancer Paternal Grandmother     Ovarian cancer Neg Hx     Uterine cancer Neg Hx     Malig Hyperthermia Neg Hx        Review of Systems   Constitutional:  Negative for activity change, appetite change, fatigue and unexpected weight change.   HENT:  Negative for congestion, facial swelling, sore throat, trouble swallowing and voice change.    Eyes:  Negative for photophobia and visual disturbance.   Respiratory:  Negative for cough and choking.    Cardiovascular:  Negative for chest pain.   Gastrointestinal:  Positive for constipation, diarrhea and nausea. Negative for abdominal distention, abdominal pain, anal bleeding, blood in stool, rectal pain and vomiting.   Endocrine: Negative for polyphagia.   Musculoskeletal:  Negative for arthralgias, gait problem and joint swelling.   Skin:  Negative for color change, pallor and rash.   Allergic/Immunologic: Negative for food allergies.   Neurological:  Negative for speech difficulty and headaches.   Hematological:  Does not bruise/bleed easily.   Psychiatric/Behavioral:  Negative for agitation,  confusion and sleep disturbance.        Vitals:    05/14/24 0959   BP: 147/87   Pulse: 74   Temp: 97.2 °F (36.2 °C)   SpO2: 98%       Physical Exam  Constitutional:       Appearance: She is well-developed. She is obese.   HENT:      Head: Normocephalic.   Eyes:      Conjunctiva/sclera: Conjunctivae normal.   Cardiovascular:      Rate and Rhythm: Normal rate and regular rhythm.   Pulmonary:      Breath sounds: Normal breath sounds.   Abdominal:      General: Bowel sounds are normal.      Palpations: Abdomen is soft.   Musculoskeletal:         General: Normal range of motion.      Cervical back: Normal range of motion.   Skin:     General: Skin is warm and dry.   Neurological:      Mental Status: She is alert and oriented to person, place, and time.   Psychiatric:         Behavior: Behavior normal.         ASSESSMENT   #1 GERD: Controlled with PPI  #2 change in bowel function with constipation and diarrhea: Improved postsurgery      PLAN  Continue current medical regimen  Office follow-up in 6 months, call sooner as needed      ICD-10-CM ICD-9-CM   1. Gastroesophageal reflux disease, unspecified whether esophagitis present  K21.9 530.81   2. Change in bowel function  R19.8 787.99

## 2024-05-23 DIAGNOSIS — F41.9 ANXIETY: ICD-10-CM

## 2024-05-23 RX ORDER — HYDROXYZINE HYDROCHLORIDE 25 MG/1
25 TABLET, FILM COATED ORAL EVERY 8 HOURS PRN
Qty: 90 TABLET | Refills: 1 | Status: SHIPPED | OUTPATIENT
Start: 2024-05-23

## 2024-06-24 ENCOUNTER — HOSPITAL ENCOUNTER (OUTPATIENT)
Facility: HOSPITAL | Age: 64
Discharge: HOME | End: 2024-06-24
Admitting: INTERNAL MEDICINE
Payer: MEDICARE

## 2024-06-24 DIAGNOSIS — R91.8 GROUND GLASS OPACITY PRESENT ON IMAGING OF LUNG: ICD-10-CM

## 2024-06-24 DIAGNOSIS — R91.1 NODULE OF LOWER LOBE OF RIGHT LUNG: ICD-10-CM

## 2024-06-24 PROCEDURE — 71250 CT THORAX DX C-: CPT

## 2024-07-06 DIAGNOSIS — E06.3 HYPOTHYROIDISM DUE TO HASHIMOTO'S THYROIDITIS: ICD-10-CM

## 2024-07-06 DIAGNOSIS — E03.8 HYPOTHYROIDISM DUE TO HASHIMOTO'S THYROIDITIS: ICD-10-CM

## 2024-07-08 RX ORDER — LEVOTHYROXINE SODIUM 75 UG/1
75 TABLET ORAL DAILY
Qty: 90 TABLET | Refills: 1 | Status: SHIPPED | OUTPATIENT
Start: 2024-07-08

## 2024-08-02 ENCOUNTER — HOSPITAL ENCOUNTER (OUTPATIENT)
Facility: HOSPITAL | Age: 64
Discharge: HOME | End: 2024-08-02
Payer: MEDICARE

## 2024-08-02 ENCOUNTER — OFFICE VISIT (OUTPATIENT)
Dept: FAMILY MEDICINE CLINIC | Facility: CLINIC | Age: 64
End: 2024-08-02
Payer: MEDICARE

## 2024-08-02 VITALS
OXYGEN SATURATION: 96 % | SYSTOLIC BLOOD PRESSURE: 126 MMHG | DIASTOLIC BLOOD PRESSURE: 86 MMHG | WEIGHT: 244.4 LBS | TEMPERATURE: 98 F | BODY MASS INDEX: 44.98 KG/M2 | HEIGHT: 62 IN | HEART RATE: 79 BPM

## 2024-08-02 DIAGNOSIS — E78.00 PURE HYPERCHOLESTEROLEMIA: ICD-10-CM

## 2024-08-02 DIAGNOSIS — E66.01 CLASS 3 SEVERE OBESITY DUE TO EXCESS CALORIES WITHOUT SERIOUS COMORBIDITY WITH BODY MASS INDEX (BMI) OF 40.0 TO 44.9 IN ADULT: ICD-10-CM

## 2024-08-02 DIAGNOSIS — M54.2 NECK PAIN: ICD-10-CM

## 2024-08-02 DIAGNOSIS — J30.89 ENVIRONMENTAL AND SEASONAL ALLERGIES: ICD-10-CM

## 2024-08-02 DIAGNOSIS — I10 PRIMARY HYPERTENSION: Primary | ICD-10-CM

## 2024-08-02 DIAGNOSIS — E11.8 CONTROLLED TYPE 2 DIABETES MELLITUS WITH COMPLICATION, WITHOUT LONG-TERM CURRENT USE OF INSULIN: ICD-10-CM

## 2024-08-02 DIAGNOSIS — N62 LARGE BREASTS: ICD-10-CM

## 2024-08-02 DIAGNOSIS — M54.40 LOW BACK PAIN WITH SCIATICA, SCIATICA LATERALITY UNSPECIFIED, UNSPECIFIED BACK PAIN LATERALITY, UNSPECIFIED CHRONICITY: ICD-10-CM

## 2024-08-02 LAB
ALBUMIN SERPL-MCNC: 4.6 G/DL (ref 3.5–5.2)
ALBUMIN/GLOB SERPL: 1.7 G/DL
ALP SERPL-CCNC: 167 U/L (ref 39–117)
ALT SERPL-CCNC: 33 U/L (ref 1–33)
AST SERPL-CCNC: 31 U/L (ref 1–32)
BASOPHILS # BLD AUTO: 0.1 10*3/MM3 (ref 0–0.2)
BASOPHILS NFR BLD AUTO: 0.9 % (ref 0–1.5)
BILIRUB SERPL-MCNC: 0.4 MG/DL (ref 0–1.2)
BUN SERPL-MCNC: 14 MG/DL (ref 8–23)
BUN/CREAT SERPL: 13.7 (ref 7–25)
CALCIUM SERPL-MCNC: 10.2 MG/DL (ref 8.6–10.5)
CHLORIDE SERPL-SCNC: 101 MMOL/L (ref 98–107)
CHOLEST SERPL-MCNC: 202 MG/DL (ref 0–200)
CO2 SERPL-SCNC: 28.6 MMOL/L (ref 22–29)
CREAT SERPL-MCNC: 1.02 MG/DL (ref 0.57–1)
EGFRCR SERPLBLD CKD-EPI 2021: 61.2 ML/MIN/1.73
EOSINOPHIL # BLD AUTO: 0.29 10*3/MM3 (ref 0–0.4)
EOSINOPHIL NFR BLD AUTO: 2.5 % (ref 0.3–6.2)
ERYTHROCYTE [DISTWIDTH] IN BLOOD BY AUTOMATED COUNT: 15.4 % (ref 12.3–15.4)
GLOBULIN SER CALC-MCNC: 2.7 GM/DL
GLUCOSE SERPL-MCNC: 99 MG/DL (ref 65–99)
HBA1C MFR BLD: 6.3 % (ref 4.8–5.6)
HCT VFR BLD AUTO: 45.8 % (ref 34–46.6)
HDLC SERPL-MCNC: 70 MG/DL (ref 40–60)
HGB BLD-MCNC: 14.3 G/DL (ref 12–15.9)
IMM GRANULOCYTES # BLD AUTO: 0.04 10*3/MM3 (ref 0–0.05)
IMM GRANULOCYTES NFR BLD AUTO: 0.3 % (ref 0–0.5)
LDLC SERPL CALC-MCNC: 110 MG/DL (ref 0–100)
LYMPHOCYTES # BLD AUTO: 2.41 10*3/MM3 (ref 0.7–3.1)
LYMPHOCYTES NFR BLD AUTO: 20.7 % (ref 19.6–45.3)
MCH RBC QN AUTO: 26.1 PG (ref 26.6–33)
MCHC RBC AUTO-ENTMCNC: 31.2 G/DL (ref 31.5–35.7)
MCV RBC AUTO: 83.7 FL (ref 79–97)
MONOCYTES # BLD AUTO: 0.78 10*3/MM3 (ref 0.1–0.9)
MONOCYTES NFR BLD AUTO: 6.7 % (ref 5–12)
NEUTROPHILS # BLD AUTO: 8 10*3/MM3 (ref 1.7–7)
NEUTROPHILS NFR BLD AUTO: 68.9 % (ref 42.7–76)
NRBC BLD AUTO-RTO: 0 /100 WBC (ref 0–0.2)
PLATELET # BLD AUTO: 233 10*3/MM3 (ref 140–450)
POTASSIUM SERPL-SCNC: 4.9 MMOL/L (ref 3.5–5.2)
PROT SERPL-MCNC: 7.3 G/DL (ref 6–8.5)
RBC # BLD AUTO: 5.47 10*6/MM3 (ref 3.77–5.28)
SODIUM SERPL-SCNC: 141 MMOL/L (ref 136–145)
TRIGL SERPL-MCNC: 128 MG/DL (ref 0–150)
VLDLC SERPL CALC-MCNC: 22 MG/DL (ref 5–40)
WBC # BLD AUTO: 11.62 10*3/MM3 (ref 3.4–10.8)

## 2024-08-02 PROCEDURE — 3074F SYST BP LT 130 MM HG: CPT | Performed by: NURSE PRACTITIONER

## 2024-08-02 PROCEDURE — 1126F AMNT PAIN NOTED NONE PRSNT: CPT | Performed by: NURSE PRACTITIONER

## 2024-08-02 PROCEDURE — 72040 X-RAY EXAM NECK SPINE 2-3 VW: CPT

## 2024-08-02 PROCEDURE — 72110 X-RAY EXAM L-2 SPINE 4/>VWS: CPT

## 2024-08-02 PROCEDURE — 3079F DIAST BP 80-89 MM HG: CPT | Performed by: NURSE PRACTITIONER

## 2024-08-02 PROCEDURE — 72072 X-RAY EXAM THORAC SPINE 3VWS: CPT

## 2024-08-02 PROCEDURE — 3044F HG A1C LEVEL LT 7.0%: CPT | Performed by: NURSE PRACTITIONER

## 2024-08-02 PROCEDURE — 1160F RVW MEDS BY RX/DR IN RCRD: CPT | Performed by: NURSE PRACTITIONER

## 2024-08-02 PROCEDURE — 1159F MED LIST DOCD IN RCRD: CPT | Performed by: NURSE PRACTITIONER

## 2024-08-02 PROCEDURE — 99214 OFFICE O/P EST MOD 30 MIN: CPT | Performed by: NURSE PRACTITIONER

## 2024-08-02 NOTE — PROGRESS NOTES
"Chief Complaint  Hypertension (Follow up/FASTING)    Subjective        Laura Cerda presents to [facility]  History of Present Illness  Patient presents office today for follow-up on hypertension.  Blood pressure is controlled today at 126/86.  With hypertension not currently taking antihypertensive medication.  With diabetes mellitus due for A1c controlled at 6.5 currently taking Jardiance.  Today she is reporting increased neck mid back low back pain.  She has large breasts is considering following up with a plastic surgeon.  She is working on weight loss is having difficulty.  Weight is also making pain worse.  With hyperlipidemia continue Lipitor 20 mg.                      Objective   Vital Signs:  /86 (BP Location: Left arm, Patient Position: Sitting, Cuff Size: Large Adult)   Pulse 79   Temp 98 °F (36.7 °C)   Ht 157.5 cm (62.01\")   Wt 111 kg (244 lb 6.4 oz)   SpO2 96%   BMI 44.69 kg/m²   Estimated body mass index is 44.69 kg/m² as calculated from the following:    Height as of this encounter: 157.5 cm (62.01\").    Weight as of this encounter: 111 kg (244 lb 6.4 oz).               Physical Exam  Constitutional:       General: She is not in acute distress.     Appearance: Normal appearance.   HENT:      Head: Normocephalic.   Eyes:      Pupils: Pupils are equal, round, and reactive to light.   Cardiovascular:      Rate and Rhythm: Normal rate and regular rhythm.      Pulses: Normal pulses.      Heart sounds: Normal heart sounds.   Pulmonary:      Effort: Pulmonary effort is normal. No respiratory distress.      Breath sounds: Normal breath sounds. No wheezing.   Abdominal:      General: Abdomen is flat.      Palpations: Abdomen is soft. There is no mass.      Tenderness: There is no abdominal tenderness.      Hernia: No hernia is present.   Musculoskeletal:         General: Tenderness present. Normal range of motion.      Cervical back: Normal, normal range of motion " and neck supple. Spasms and tenderness present. Decreased range of motion.      Thoracic back: Normal. Spasms and tenderness present. Decreased range of motion.      Lumbar back: Spasms and tenderness present. No edema or bony tenderness. Decreased range of motion. No scoliosis.   Skin:     General: Skin is warm.   Neurological:      General: No focal deficit present.      Mental Status: She is alert and oriented to person, place, and time.   Psychiatric:         Mood and Affect: Mood normal.         Behavior: Behavior normal.         Thought Content: Thought content normal.         Judgment: Judgment normal.        Result Review :    The following data was reviewed by: JYOTI Harrington on 08/02/2024:  Common labs          12/8/2023    14:05 1/30/2024    14:26 4/30/2024    12:12   Common Labs   Glucose  95  96    BUN  14  14    Creatinine 0.90  0.93  0.96    Sodium  141  143    Potassium  4.5  4.9    Chloride  99  101    Calcium  9.8  9.9    Total Protein  7.4  7.6    Albumin  4.5  4.5    Total Bilirubin  0.4  0.4    Alkaline Phosphatase  167  164    AST (SGOT)  39  33    ALT (SGPT)  40  29    WBC  10.8  9.4    Hemoglobin  13.6  13.8    Hematocrit  43.4  44.4    Platelets  212  204    Total Cholesterol  182  197    Triglycerides  128  151    HDL Cholesterol  61  57    LDL Cholesterol   99  114    Hemoglobin A1C  6.3  6.5    Microalbumin, Urine  4.1  4.1    Uric Acid   4.7      Data reviewed : Radiologic studies ct CHEST             Assessment and Plan     Diagnoses and all orders for this visit:    1. Primary hypertension (Primary)  Assessment & Plan:  Hypertension is stable and controlled  Continue current treatment regimen.  Blood pressure will be reassessed in 6 months.    Orders:  -     Comprehensive Metabolic Panel    2. Environmental and seasonal allergies  -     CBC & Differential    3. Pure hypercholesterolemia  Assessment & Plan:   Lipid abnormalities are stable    Plan:  Continue same medication/s  without change.      Discussed medication dosage, use, side effects, and goals of treatment in detail.    Counseled patient on lifestyle modifications to help control hyperlipidemia.     Patient Treatment Goals:   LDL goal is less than 70    Followup in 6 months.    Orders:  -     Lipid Panel    4. Controlled type 2 diabetes mellitus with complication, without long-term current use of insulin  -     Hemoglobin A1c  -     Semaglutide,0.25 or 0.5MG/DOS, (OZEMPIC) 2 MG/1.5ML solution pen-injector; Inject 0.25 mg under the skin into the appropriate area as directed 1 (One) Time Per Week.  Dispense: 1 mL; Refill: 0    5. Low back pain with sciatica, sciatica laterality unspecified, unspecified back pain laterality, unspecified chronicity  -     XR spine thoracic 3 vw; Future  -     XR spine lumbar 4+ vw; Future    6. Neck pain  -     XR spine cervical 2 or 3 vw; Future    7. Large breasts  -     XR spine cervical 2 or 3 vw; Future  -     XR spine thoracic 3 vw; Future  -     XR spine lumbar 4+ vw; Future    8. Class 3 severe obesity due to excess calories without serious comorbidity with body mass index (BMI) of 40.0 to 44.9 in adult  -     Hemoglobin A1c  -     Semaglutide,0.25 or 0.5MG/DOS, (OZEMPIC) 2 MG/1.5ML solution pen-injector; Inject 0.25 mg under the skin into the appropriate area as directed 1 (One) Time Per Week.  Dispense: 1 mL; Refill: 0  -     XR spine cervical 2 or 3 vw; Future  -     XR spine thoracic 3 vw; Future  -     XR spine lumbar 4+ vw; Future           I spent 30 minutes caring for Laura on this date of service. This time includes time spent by me in the following activities:preparing for the visit, reviewing tests, obtaining and/or reviewing a separately obtained history, performing a medically appropriate examination and/or evaluation , counseling and educating the patient/family/caregiver, ordering medications, tests, or procedures, documenting information in the medical record, independently  interpreting results and communicating that information with the patient/family/caregiver, and care coordination  Follow Up     Return in about 5 weeks (around 9/9/2024) for Recheck.  Patient was given instructions and counseling regarding her condition or for health maintenance advice. Please see specific information pulled into the AVS if appropriate.

## 2024-08-06 DIAGNOSIS — M54.40 LOW BACK PAIN WITH SCIATICA, SCIATICA LATERALITY UNSPECIFIED, UNSPECIFIED BACK PAIN LATERALITY, UNSPECIFIED CHRONICITY: Primary | ICD-10-CM

## 2024-08-16 ENCOUNTER — HOSPITAL ENCOUNTER (OUTPATIENT)
Facility: HOSPITAL | Age: 64
Discharge: HOME | End: 2024-08-16
Payer: MEDICARE

## 2024-08-16 DIAGNOSIS — M54.40 LOW BACK PAIN WITH SCIATICA, SCIATICA LATERALITY UNSPECIFIED, UNSPECIFIED BACK PAIN LATERALITY, UNSPECIFIED CHRONICITY: ICD-10-CM

## 2024-08-16 PROCEDURE — 72148 MRI LUMBAR SPINE W/O DYE: CPT

## 2024-08-20 DIAGNOSIS — G89.29 CHRONIC BILATERAL LOW BACK PAIN WITHOUT SCIATICA: Primary | ICD-10-CM

## 2024-08-20 DIAGNOSIS — M54.50 CHRONIC BILATERAL LOW BACK PAIN WITHOUT SCIATICA: Primary | ICD-10-CM

## 2024-08-26 RX ORDER — ALLOPURINOL 100 MG/1
TABLET ORAL
Qty: 90 TABLET | Refills: 1 | Status: SHIPPED | OUTPATIENT
Start: 2024-08-26

## 2024-08-28 NOTE — PROGRESS NOTES
Subjective   Patient ID: Laura Cerda is a 64 y.o. female is being seen for consultation today at the request of JYOTI Harrington  For  -Chronic bilateral low back pain without sciatica   MRI lumbar spine w/o contrast preformed on 08/16/2024  XR spine lumbar flex ext preformed today.          History of Present Illness  Laura Cerda is a 64-year-old lady who presents today with complaints of low back pain, neck pain, right arm pain, pain radiating into the legs.  She reports that the pain in her legs is worse when she is walking distances and she has to sit or lean over to improve her back pain.  She describes the pain as a aching burning sensation that is mostly in her buttocks lateral thigh and inner thigh consistent with an L3 radiculopathy and symptoms of neurogenic claudication. Denies changes in bowel or bladder habits, denies bowel or bladder incontinence, denies sacral numbness or burning, denies changes in gait or balance, denies difficulty with fine motor movements of the hands or dropping objects.  She also reports neck pain that sometimes radiates down her fingers into her hand worse on the right than the left.  She is unsure of which fingers that radiates down into but she will wake up with burning or numbness in the arm and hand starting from the shoulder to the lateral aspect of the arm and into the fingertips.   Of note, the patient has tried to lose weight and recently spoke to her PCP about Ozempic but was unable to get full coverage of the drug and cannot afford the co-pay at this time.       The following portions of the patient's history were reviewed and updated as appropriate: allergies, current medications, past family history, past medical history, past social history, past surgical history, and problem list.    Review of Systems   Constitutional:  Negative for fever.   HENT:  Positive for tinnitus (R ear).    Eyes:  Negative for visual disturbance.   Cardiovascular:  Positive for leg  "swelling.   Gastrointestinal:  Positive for nausea.   Genitourinary:  Negative for difficulty urinating.   Musculoskeletal:  Positive for back pain, gait problem, neck pain and neck stiffness.   Neurological:  Positive for weakness, numbness (Bilateral arms) and headaches. Negative for dizziness.   Psychiatric/Behavioral:  Negative for decreased concentration. The patient is not nervous/anxious.    All other systems reviewed and are negative.      Objective     Vitals:    09/04/24 0846   BP: 124/72   BP Location: Right arm   Patient Position: Sitting   Cuff Size: Adult   Pulse: 65   Resp: 16   Temp: 97.1 °F (36.2 °C)   SpO2: 100%   Weight: 111 kg (244 lb)   Height: 157.5 cm (62.01\")   PainSc:   5   PainLoc: Back     Body mass index is 44.62 kg/m².    Physical Exam:    Alert and oriented x 3, gait normal., reflexes normal and symmetric, strength and  sensation grossly normal, negative, negative findings: speech normal, mental status intact, cranial nerves 2-12 intact, positive findings: reflexes - biceps: 3+/3+, triceps: 2+/4+, patellar 2+/2+, ankle:: 1+/1+, antalgic gait  Garcia's bilaterally    Assessment & Plan     Laura Cerda  reports that she has never smoked. She has never been exposed to tobacco smoke. She has never used smokeless tobacco.     Independent Review of Radiographic Studies:      I personally reviewed the images from the following studies.    MRI of the lumbar spine without contrast performed at [facility] on 8/16/2024:  Multilevel degenerative disc disease L2-3 with moderate to severe central canal stenosis due to severe disc space narrowing and a 3 mm retrolisthesis of L2 on L3.  No major foraminal narrowing  At L3-4 there is degenerative disc disease with a rotational 2 mm retrolisthesis of L3 on L4, no significant nerve root compression though there is some moderate foraminal stenosis  At L4-5, facet hypertrophy bilaterally small anterolisthesis of L4 on 5  L5-S1, degenerative " anterolisthesis on 5onS1 no canal or lateral recess narrowing.    Standing x-rays of cervical thoracic and lumbar spine from August 2, 2024:  Multilevel degenerative disc disease in both the cervical and lumbar spine.      Medical Decision Making:      I spent 60 minutes caring for Laura on this date of service. This time includes time spent by me in the following activities:preparing for the visit, reviewing tests, obtaining and/or reviewing a separately obtained history, performing a medically appropriate examination and/or evaluation , counseling and educating the patient/family/caregiver, ordering medications, tests, or procedures, referring and communicating with other health care professionals , documenting information in the medical record, and independently interpreting results and communicating that information with the patient/family/caregiver        Problems: Severe exacerbation of chronic illness: Worsening of lumbar radiculopathy, neurogenic claudication, has chronic back pain, Data: Tests, Documents, Historian (any combination of 3): Prior external notes reviewed: PCP notes from April 30 and August 2 of this year, Tet results reviewed: MRI of the lumbar spine without, performed 8/6/2024, x-rays of the cervical thoracic and lumbar spine performed 8/2, and Tests ordered: MRI cervical spine without to assess for cervical myelopathy or radiculopathy, Tests independently interpreted: MRI of the lumbar spine and x-rays of the cervical thoracic and lumbar spine, and Case discussed with external physician or APC: Message sent to PCP regarding weight loss attempts, and Risk: Decision for elective major surgery with identified patient or procedure risk factors: Discussion of risks and benefits of surgery and the particularly high risk of surgery in her setting with a history of morbid obesity.  Patient will attempt weight loss and come back to clinic for discussion of MRI cervical spine and elective lumbar  surgery    Diagnoses and all orders for this visit:    1. Neck pain (Primary)  Assessment & Plan:  Patient has neck pain radiating down into her arm.  She is also hyperreflexic in both upper extremities and lower extremities.  Will get MRI cervical without contrast to assess for cervical canal stenosis    Orders:  -     MRI Cervical Spine Without Contrast; Future    2. Cervical radiculopathy  -     MRI Cervical Spine Without Contrast; Future    3. Weight loss counseling, encounter for  Assessment & Plan:  Counseled patient extensively on the need for weight loss to under 220 pounds, BMI less than 40 for consideration of surgical management of her lumbar stenosis due to significantly high risk profile for performing elective surgery on patient with a BMI of over 40.  Spent at least 20 minutes counseling on exercise and diet, weight loss drug options.  The patient would like to pursue Ozempic but cannot afford it even with insurance assistance.  Patient will discuss with her PCP further and look at [facility] offered wellness program.  Referral to dietitian for weight loss assistance      4. Low back pain with sciatica, sciatica laterality unspecified, unspecified back pain laterality, unspecified chronicity  Assessment & Plan:  Patient has lumbar stenosis worse at L3-4 that is likely contributing to her symptoms of neurogenic claudication and sciatica.  Discussed with patient the significant risk of doing elective surgery in the setting of morbid obesity.  Advised the patient to work on weight loss strategies and come back to clinic when her BMI is less than 40, which would be a goal weight of under 220 pounds.  Patient would greatly benefit from the surgery and I encouraged her to speak to her PCP again about pursuing Ozempic or other medical weight loss strategies        Return in about 4 weeks (around 10/2/2024).  To review cervical MRI results and see how weight loss journey is going.         This note was completed  using Dragon Dictation software.

## 2024-09-03 ENCOUNTER — TELEPHONE (OUTPATIENT)
Dept: NEUROSURGERY | Facility: CLINIC | Age: 64
End: 2024-09-03
Payer: COMMERCIAL

## 2024-09-03 DIAGNOSIS — M54.50 LOW BACK PAIN WITHOUT SCIATICA, UNSPECIFIED BACK PAIN LATERALITY, UNSPECIFIED CHRONICITY: Primary | ICD-10-CM

## 2024-09-03 DIAGNOSIS — M54.2 NECK PAIN: ICD-10-CM

## 2024-09-03 NOTE — TELEPHONE ENCOUNTER
Called and spoke to pt to let her know to be here on 09/04/2024@8:30 for XR prior to appt@9.  Pt voiced understanding.

## 2024-09-04 ENCOUNTER — OFFICE VISIT (OUTPATIENT)
Dept: NEUROSURGERY | Facility: CLINIC | Age: 64
End: 2024-09-04
Payer: MEDICARE

## 2024-09-04 ENCOUNTER — PATIENT ROUNDING (BHMG ONLY) (OUTPATIENT)
Dept: NEUROSURGERY | Facility: CLINIC | Age: 64
End: 2024-09-04
Payer: COMMERCIAL

## 2024-09-04 VITALS
SYSTOLIC BLOOD PRESSURE: 124 MMHG | OXYGEN SATURATION: 100 % | HEIGHT: 62 IN | TEMPERATURE: 97.1 F | WEIGHT: 244 LBS | BODY MASS INDEX: 44.9 KG/M2 | DIASTOLIC BLOOD PRESSURE: 72 MMHG | HEART RATE: 65 BPM | RESPIRATION RATE: 16 BRPM

## 2024-09-04 DIAGNOSIS — Z71.3 WEIGHT LOSS COUNSELING, ENCOUNTER FOR: ICD-10-CM

## 2024-09-04 DIAGNOSIS — M54.2 NECK PAIN: Primary | ICD-10-CM

## 2024-09-04 DIAGNOSIS — M54.12 CERVICAL RADICULOPATHY: ICD-10-CM

## 2024-09-04 DIAGNOSIS — M54.40 LOW BACK PAIN WITH SCIATICA, SCIATICA LATERALITY UNSPECIFIED, UNSPECIFIED BACK PAIN LATERALITY, UNSPECIFIED CHRONICITY: ICD-10-CM

## 2024-09-04 PROCEDURE — 1160F RVW MEDS BY RX/DR IN RCRD: CPT | Performed by: STUDENT IN AN ORGANIZED HEALTH CARE EDUCATION/TRAINING PROGRAM

## 2024-09-04 PROCEDURE — 3078F DIAST BP <80 MM HG: CPT | Performed by: STUDENT IN AN ORGANIZED HEALTH CARE EDUCATION/TRAINING PROGRAM

## 2024-09-04 PROCEDURE — 3074F SYST BP LT 130 MM HG: CPT | Performed by: STUDENT IN AN ORGANIZED HEALTH CARE EDUCATION/TRAINING PROGRAM

## 2024-09-04 PROCEDURE — 1159F MED LIST DOCD IN RCRD: CPT | Performed by: STUDENT IN AN ORGANIZED HEALTH CARE EDUCATION/TRAINING PROGRAM

## 2024-09-04 NOTE — ASSESSMENT & PLAN NOTE
Patient has lumbar stenosis worse at L3-4 that is likely contributing to her symptoms of neurogenic claudication and sciatica.  Discussed with patient the significant risk of doing elective surgery in the setting of morbid obesity.  Advised the patient to work on weight loss strategies and come back to clinic when her BMI is less than 40, which would be a goal weight of under 220 pounds.  Patient would greatly benefit from the surgery and I encouraged her to speak to her PCP again about pursuing Ozempic or other medical weight loss strategies

## 2024-09-04 NOTE — ASSESSMENT & PLAN NOTE
Patient has neck pain radiating down into her arm.  She is also hyperreflexic in both upper extremities and lower extremities.  Will get MRI cervical without contrast to assess for cervical canal stenosis

## 2024-09-04 NOTE — ASSESSMENT & PLAN NOTE
Counseled patient extensively on the need for weight loss to under 220 pounds, BMI less than 40 for consideration of surgical management of her lumbar stenosis due to significantly high risk profile for performing elective surgery on patient with a BMI of over 40.  Spent at least 20 minutes counseling on exercise and diet, weight loss drug options.  The patient would like to pursue Ozempic but cannot afford it even with insurance assistance.  Patient will discuss with her PCP further and look at [employer] offered wellness program.  Referral to dietitian for weight loss assistance

## 2024-09-09 ENCOUNTER — OFFICE VISIT (OUTPATIENT)
Dept: FAMILY MEDICINE CLINIC | Facility: CLINIC | Age: 64
End: 2024-09-09
Payer: MEDICARE

## 2024-09-09 VITALS
HEART RATE: 74 BPM | HEIGHT: 62 IN | SYSTOLIC BLOOD PRESSURE: 124 MMHG | WEIGHT: 245.8 LBS | OXYGEN SATURATION: 96 % | BODY MASS INDEX: 45.23 KG/M2 | TEMPERATURE: 97.1 F | DIASTOLIC BLOOD PRESSURE: 82 MMHG

## 2024-09-09 DIAGNOSIS — E11.8 CONTROLLED TYPE 2 DIABETES MELLITUS WITH COMPLICATION, WITHOUT LONG-TERM CURRENT USE OF INSULIN: Primary | ICD-10-CM

## 2024-09-09 DIAGNOSIS — E66.01 CLASS 3 SEVERE OBESITY DUE TO EXCESS CALORIES WITHOUT SERIOUS COMORBIDITY WITH BODY MASS INDEX (BMI) OF 40.0 TO 44.9 IN ADULT: ICD-10-CM

## 2024-09-09 DIAGNOSIS — R74.8 ELEVATED ALKALINE PHOSPHATASE LEVEL: ICD-10-CM

## 2024-09-09 DIAGNOSIS — D72.829 LEUKOCYTOSIS, UNSPECIFIED TYPE: ICD-10-CM

## 2024-09-09 DIAGNOSIS — M51.36 NARROWING OF LUMBAR INTERVERTEBRAL DISC SPACE: ICD-10-CM

## 2024-09-09 DIAGNOSIS — E78.00 PURE HYPERCHOLESTEROLEMIA: ICD-10-CM

## 2024-09-09 DIAGNOSIS — I10 PRIMARY HYPERTENSION: ICD-10-CM

## 2024-09-09 DIAGNOSIS — Z71.3 WEIGHT LOSS COUNSELING, ENCOUNTER FOR: ICD-10-CM

## 2024-09-09 DIAGNOSIS — Z11.59 ENCOUNTER FOR HEPATITIS C SCREENING TEST FOR LOW RISK PATIENT: ICD-10-CM

## 2024-09-09 PROBLEM — M51.369 NARROWING OF LUMBAR INTERVERTEBRAL DISC SPACE: Status: ACTIVE | Noted: 2024-09-09

## 2024-09-09 PROCEDURE — 99214 OFFICE O/P EST MOD 30 MIN: CPT | Performed by: NURSE PRACTITIONER

## 2024-09-09 PROCEDURE — 1125F AMNT PAIN NOTED PAIN PRSNT: CPT | Performed by: NURSE PRACTITIONER

## 2024-09-09 PROCEDURE — 3044F HG A1C LEVEL LT 7.0%: CPT | Performed by: NURSE PRACTITIONER

## 2024-09-09 PROCEDURE — 3079F DIAST BP 80-89 MM HG: CPT | Performed by: NURSE PRACTITIONER

## 2024-09-09 PROCEDURE — 3074F SYST BP LT 130 MM HG: CPT | Performed by: NURSE PRACTITIONER

## 2024-09-09 RX ORDER — ATORVASTATIN CALCIUM 20 MG/1
20 TABLET, FILM COATED ORAL NIGHTLY
Qty: 90 TABLET | Refills: 1 | Status: SHIPPED | OUTPATIENT
Start: 2024-09-09

## 2024-09-09 NOTE — PROGRESS NOTES
"Chief Complaint  Hypertension (5 week follow up )    Subjective        Laura Cerda presents to [facility]  History of Present Illness  Patient presents the office today for follow-up on hypertension.  Blood pressure is controlled 124/82.  With hypertension she is not currently taking antihypertensive medication.  With diabetes mellitus she is currently taking Jardiance 10 mg.  Patient is wanting further evaluation with diabetes mellitus and weight loss.  She is currently following a neurosurgeon and has been elected to have surgery and is needing weight loss before surgery completion.  I would like to start her today on Ozempic.  With hyperlipidemia continue Lipitor 20 mg.  With elevated alkaline phosphatase further lab work accompanied by repeating CBC for leukocytosis.  With chronic back pain stable today under the care of the neurosurgeon.  With obesity BMI of 44.            Objective   Vital Signs:  /82 (BP Location: Left arm, Patient Position: Sitting, Cuff Size: Large Adult)   Pulse 74   Temp 97.1 °F (36.2 °C) (Infrared)   Ht 157.5 cm (62.01\")   Wt 111 kg (245 lb 12.8 oz)   SpO2 96%   BMI 44.94 kg/m²   Estimated body mass index is 44.94 kg/m² as calculated from the following:    Height as of this encounter: 157.5 cm (62.01\").    Weight as of this encounter: 111 kg (245 lb 12.8 oz).            Physical Exam  Constitutional:       General: She is not in acute distress.     Appearance: Normal appearance.   HENT:      Head: Normocephalic.      Right Ear: Tympanic membrane, ear canal and external ear normal. There is no impacted cerumen.      Left Ear: Tympanic membrane, ear canal and external ear normal. There is no impacted cerumen.      Nose: Nose normal.   Eyes:      Pupils: Pupils are equal, round, and reactive to light.   Cardiovascular:      Rate and Rhythm: Normal rate and regular rhythm.      Pulses: Normal pulses.      Heart sounds: Normal heart sounds. "   Pulmonary:      Effort: Pulmonary effort is normal. No respiratory distress.      Breath sounds: Normal breath sounds. No wheezing.   Abdominal:      General: Abdomen is flat. Bowel sounds are normal.      Palpations: Abdomen is soft. There is no mass.      Tenderness: There is no abdominal tenderness.      Hernia: No hernia is present.   Musculoskeletal:         General: Tenderness present. Normal range of motion.      Cervical back: Normal, normal range of motion and neck supple. No spasms or tenderness. Normal range of motion.      Thoracic back: Normal. No spasms or tenderness. Normal range of motion.      Lumbar back: Spasms and tenderness present. No edema or bony tenderness. Decreased range of motion. No scoliosis.   Skin:     General: Skin is warm.   Neurological:      General: No focal deficit present.      Mental Status: She is alert and oriented to person, place, and time.   Psychiatric:         Mood and Affect: Mood normal.         Behavior: Behavior normal.         Thought Content: Thought content normal.         Judgment: Judgment normal.        Result Review :                Assessment and Plan   Diagnoses and all orders for this visit:    1. Controlled type 2 diabetes mellitus with complication, without long-term current use of insulin (Primary)  -     Semaglutide,0.25 or 0.5MG/DOS, (OZEMPIC) 2 MG/1.5ML solution pen-injector; Inject 0.25 mg under the skin into the appropriate area as directed 1 (One) Time Per Week.  Dispense: 1 mL; Refill: 0    2. Primary hypertension    3. Leukocytosis, unspecified type  -     CBC w AUTO Differential    4. Encounter for hepatitis C screening test for low risk patient  -     Hepatitis C antibody    5. Elevated alkaline phosphatase level  -     Alkaline Phosphatase, Isoenzymes    6. Weight loss counseling, encounter for    7. Class 3 severe obesity due to excess calories without serious comorbidity with body mass index (BMI) of 40.0 to 44.9 in adult  -      Semaglutide,0.25 or 0.5MG/DOS, (OZEMPIC) 2 MG/1.5ML solution pen-injector; Inject 0.25 mg under the skin into the appropriate area as directed 1 (One) Time Per Week.  Dispense: 1 mL; Refill: 0    8. Narrowing of lumbar intervertebral disc space  -     Semaglutide,0.25 or 0.5MG/DOS, (OZEMPIC) 2 MG/1.5ML solution pen-injector; Inject 0.25 mg under the skin into the appropriate area as directed 1 (One) Time Per Week.  Dispense: 1 mL; Refill: 0    9. Pure hypercholesterolemia  -     atorvastatin (LIPITOR) 20 MG tablet; Take 1 tablet by mouth Every Night.  Dispense: 90 tablet; Refill: 1    Side effects of all new and old medications reviewed with the patient -willing to accept all risks involved.  Advised to rto if no improvement or worsening of symptoms.  Patient instructed to  clinical summary at .              Follow Up   Return in about 3 months (around 12/9/2024).  Patient was given instructions and counseling regarding her condition or for health maintenance advice. Please see specific information pulled into the AVS if appropriate.

## 2024-09-10 LAB
ALP BONE CFR SERPL: 26 % (ref 14–68)
ALP INTEST CFR SERPL: 1 % (ref 0–18)
ALP LIVER CFR SERPL: 73 % (ref 18–85)
ALP SERPL-CCNC: 152 IU/L (ref 44–121)
BASOPHILS # BLD AUTO: 0.1 X10E3/UL (ref 0–0.2)
BASOPHILS NFR BLD AUTO: 1 %
EOSINOPHIL # BLD AUTO: 0.2 X10E3/UL (ref 0–0.4)
EOSINOPHIL NFR BLD AUTO: 2 %
ERYTHROCYTE [DISTWIDTH] IN BLOOD BY AUTOMATED COUNT: 15.6 % (ref 11.7–15.4)
HCT VFR BLD AUTO: 46.6 % (ref 34–46.6)
HCV IGG SERPL QL IA: NON REACTIVE
HGB BLD-MCNC: 14.6 G/DL (ref 11.1–15.9)
IMM GRANULOCYTES # BLD AUTO: 0 X10E3/UL (ref 0–0.1)
IMM GRANULOCYTES NFR BLD AUTO: 0 %
LYMPHOCYTES # BLD AUTO: 1.8 X10E3/UL (ref 0.7–3.1)
LYMPHOCYTES NFR BLD AUTO: 21 %
MCH RBC QN AUTO: 27.4 PG (ref 26.6–33)
MCHC RBC AUTO-ENTMCNC: 31.3 G/DL (ref 31.5–35.7)
MCV RBC AUTO: 87 FL (ref 79–97)
MONOCYTES # BLD AUTO: 0.6 X10E3/UL (ref 0.1–0.9)
MONOCYTES NFR BLD AUTO: 6 %
NEUTROPHILS # BLD AUTO: 6.2 X10E3/UL (ref 1.4–7)
NEUTROPHILS NFR BLD AUTO: 70 %
PLATELET # BLD AUTO: 211 X10E3/UL (ref 150–450)
RBC # BLD AUTO: 5.33 X10E6/UL (ref 3.77–5.28)
WBC # BLD AUTO: 8.9 X10E3/UL (ref 3.4–10.8)

## 2024-09-25 ENCOUNTER — HOSPITAL ENCOUNTER (OUTPATIENT)
Dept: MRI IMAGING | Facility: HOSPITAL | Age: 64
Discharge: HOME | End: 2024-09-25
Admitting: STUDENT IN AN ORGANIZED HEALTH CARE EDUCATION/TRAINING PROGRAM
Payer: MEDICARE

## 2024-09-25 DIAGNOSIS — M54.12 CERVICAL RADICULOPATHY: ICD-10-CM

## 2024-09-25 DIAGNOSIS — M54.2 NECK PAIN: ICD-10-CM

## 2024-09-25 PROCEDURE — 72141 MRI NECK SPINE W/O DYE: CPT

## 2024-09-26 RX ORDER — LEVOTHYROXINE SODIUM 75 UG/1
75 TABLET ORAL DAILY
Qty: 30 TABLET | Refills: 0 | Status: SHIPPED | OUTPATIENT
Start: 2024-09-26

## 2024-09-30 ENCOUNTER — TELEPHONE (OUTPATIENT)
Dept: FAMILY MEDICINE CLINIC | Facility: CLINIC | Age: 64
End: 2024-09-30
Payer: COMMERCIAL

## 2024-09-30 NOTE — TELEPHONE ENCOUNTER
Caller: Laura Cerda    Relationship: Self    Best call back number: [phone]     What is the best time to reach you: ANY    Who are you requesting to speak with (clinical staff, provider,  specific staff member): ANJUM LANG        What was the call regarding: PATIENT IS CALLING TO CHECK THE STATUS OF PRIOR AUTHORIZATION FOR    Semaglutide,0.25 or 0.5MG/DOS, (OZEMPIC) 2 MG/1.5ML solution pen-injector     PLEASE CALL PATIENT TO UPDATE

## 2024-10-04 NOTE — PROGRESS NOTES
"Subjective   Patient ID: Laura Cerda is a 64 y.o. female is here today for follow-up for   -Neck pain   MRI cervical spine w/o contrast completed on   09/25/2024.      HPI:  Since their last visit (see previous note), Laura reports that she has continued back pain.  She does have some neck stiffness as well.  She returns to clinic with an MRI of the cervical spine.  She still has occasional shoulder pain with pain in her arms as well.  She is lost 2 pounds since her last visit and saw nutritionist yesterday to discuss weight loss strategies.  She is also working to try to get Ozempic or Mounjaro approved for her weight loss strategy.    Review of Systems   Musculoskeletal:  Positive for neck pain and neck stiffness.   All other systems reviewed and are negative.      Objective     Vitals:    10/09/24 0911   BP: 128/70   BP Location: Left arm   Patient Position: Sitting   Cuff Size: Adult   Pulse: 88   Resp: 16   Temp: 97.8 °F (36.6 °C)   SpO2: 97%   Weight: 112 kg (246 lb)   Height: 157 cm (61.81\")   PainSc:   5   PainLoc: Neck     Body mass index is 45.27 kg/m².    Lab Results   Component Value Date    WBC 8.9 09/09/2024    HGB 14.6 09/09/2024    HCT 46.6 09/09/2024    MCV 87 09/09/2024     09/09/2024     Lab Results   Component Value Date    GLUCOSE 99 08/02/2024    CALCIUM 10.2 08/02/2024     08/02/2024    K 4.9 08/02/2024    CO2 28.6 08/02/2024     08/02/2024    BUN 14 08/02/2024    CREATININE 1.02 (H) 08/02/2024    EGFRRESULT 61.2 08/02/2024    EGFR 85.5 10/11/2022    BCR 13.7 08/02/2024    ANIONGAP 10.7 10/11/2022       Physical Exam:      Alert and oriented x 3, gait normal., reflexes normal and symmetric, strength and  sensation grossly normal, negative, negative findings: speech normal, mental status intact, cranial nerves 2-12 intact, positive findings: reflexes - biceps: 3+/3+, triceps: 2+/4+, patellar 2+/2+, ankle:: 1+/1+, antalgic gait  Garcia's bilaterally    Assessment & Plan "     Laura Cerda  reports that she has never smoked. She has never been exposed to tobacco smoke. She has never used smokeless tobacco.     Independent Review of Radiographic Studies:      I personally reviewed the images from the following studies.    MRI of the cervical spine performed on 9/25/2024.  There is multilevel mild degenerative disc disease without significant canal or foraminal stenosis    Medical Decision Making:      I spent 20 minutes caring for Laura on this date of service. This time includes time spent by me in the following activities:preparing for the visit, reviewing tests, obtaining and/or reviewing a separately obtained history, performing a medically appropriate examination and/or evaluation , counseling and educating the patient/family/caregiver, ordering medications, tests, or procedures, documenting information in the medical record, independently interpreting results and communicating that information with the patient/family/caregiver, and care coordination          Assessment & Plan  Neck pain  Recommend physical therapy for neck pain.  There is no evidence on MRI to support a compressive radicular nature  Low back pain with sciatica, sciatica laterality unspecified, unspecified back pain laterality, unspecified chronicity  Patient will continue to work on weight loss and will be in touch when her BMI is under 40 for consideration of possible surgery.  She will see physical therapy in the meantime.    Orders Placed This Encounter   Procedures    Ambulatory Referral to Physical Therapy for Evaluation & Treatment            No follow-ups on file.     This note was completed using Dragon Dictation software.

## 2024-10-07 ENCOUNTER — TELEPHONE (OUTPATIENT)
Dept: FAMILY MEDICINE CLINIC | Facility: CLINIC | Age: 64
End: 2024-10-07
Payer: COMMERCIAL

## 2024-10-08 ENCOUNTER — HOSPITAL ENCOUNTER (OUTPATIENT)
Dept: DIABETES SERVICES | Facility: HOSPITAL | Age: 64
Discharge: HOME | End: 2024-10-08
Payer: MEDICARE

## 2024-10-08 PROCEDURE — 97803 MED NUTRITION INDIV SUBSEQ: CPT

## 2024-10-08 NOTE — CONSULTS
Laura met with DEMAR GRAY for Diabetes Education.  A comprehensive assessment/training record has been sent to medical records (see media tab) to associate with this encounter.     Spouse in attendance and supportive. Last seen by a RD 2/20/24- weight was 255#. Current weight 245#. Was trying to get ozempic but could not afford. Reviewed plate method- consistent carbohydrate diet/carbohydrate counting discussed. Encouraged balancing carbs with lean proteins/limiting saturated fats. Portions, labels, meal planning reviewed.     Laura has been encouraged to call our office with questions or additional education needs. Please place referral for additional services or follow-up should need arise.     Thank you for the referral.

## 2024-10-09 ENCOUNTER — OFFICE VISIT (OUTPATIENT)
Dept: NEUROSURGERY | Facility: CLINIC | Age: 64
End: 2024-10-09
Payer: MEDICARE

## 2024-10-09 VITALS
TEMPERATURE: 97.8 F | OXYGEN SATURATION: 97 % | DIASTOLIC BLOOD PRESSURE: 70 MMHG | BODY MASS INDEX: 45.27 KG/M2 | WEIGHT: 246 LBS | HEART RATE: 88 BPM | SYSTOLIC BLOOD PRESSURE: 128 MMHG | RESPIRATION RATE: 16 BRPM | HEIGHT: 62 IN

## 2024-10-09 DIAGNOSIS — M54.40 LOW BACK PAIN WITH SCIATICA, SCIATICA LATERALITY UNSPECIFIED, UNSPECIFIED BACK PAIN LATERALITY, UNSPECIFIED CHRONICITY: ICD-10-CM

## 2024-10-09 DIAGNOSIS — M54.2 NECK PAIN: Primary | ICD-10-CM

## 2024-10-09 NOTE — ASSESSMENT & PLAN NOTE
Patient will continue to work on weight loss and will be in touch when her BMI is under 40 for consideration of possible surgery.  She will see physical therapy in the meantime.

## 2024-10-09 NOTE — ASSESSMENT & PLAN NOTE
Recommend physical therapy for neck pain.  There is no evidence on MRI to support a compressive radicular nature

## 2024-10-15 ENCOUNTER — TELEPHONE (OUTPATIENT)
Dept: NEUROSURGERY | Facility: CLINIC | Age: 64
End: 2024-10-15
Payer: COMMERCIAL

## 2024-10-22 RX ORDER — LEVOTHYROXINE SODIUM 75 UG/1
75 TABLET ORAL DAILY
Qty: 90 TABLET | Refills: 0 | Status: SHIPPED | OUTPATIENT
Start: 2024-10-22

## 2024-10-28 ENCOUNTER — TRANSCRIBE ORDERS (OUTPATIENT)
Dept: ADMINISTRATIVE | Facility: HOSPITAL | Age: 64
End: 2024-10-28
Payer: COMMERCIAL

## 2024-10-28 ENCOUNTER — TRANSCRIBE ORDERS (OUTPATIENT)
Facility: HOSPITAL | Age: 64
End: 2024-10-28
Payer: COMMERCIAL

## 2024-10-28 ENCOUNTER — HOSPITAL ENCOUNTER (OUTPATIENT)
Facility: HOSPITAL | Age: 64
Discharge: HOME | End: 2024-10-28
Admitting: INTERNAL MEDICINE
Payer: MEDICARE

## 2024-10-28 DIAGNOSIS — M25.562 LEFT KNEE PAIN, UNSPECIFIED CHRONICITY: ICD-10-CM

## 2024-10-28 DIAGNOSIS — M25.562 LEFT KNEE PAIN, UNSPECIFIED CHRONICITY: Primary | ICD-10-CM

## 2024-10-28 PROCEDURE — 73562 X-RAY EXAM OF KNEE 3: CPT

## 2024-11-12 ENCOUNTER — OFFICE VISIT (OUTPATIENT)
Dept: GASTROENTEROLOGY | Facility: CLINIC | Age: 64
End: 2024-11-12
Payer: MEDICARE

## 2024-11-12 ENCOUNTER — TELEPHONE (OUTPATIENT)
Dept: GASTROENTEROLOGY | Facility: CLINIC | Age: 64
End: 2024-11-12

## 2024-11-12 VITALS
WEIGHT: 243.6 LBS | TEMPERATURE: 97.9 F | HEART RATE: 88 BPM | HEIGHT: 62 IN | SYSTOLIC BLOOD PRESSURE: 141 MMHG | OXYGEN SATURATION: 94 % | DIASTOLIC BLOOD PRESSURE: 75 MMHG | BODY MASS INDEX: 44.83 KG/M2

## 2024-11-12 DIAGNOSIS — K21.9 GASTROESOPHAGEAL REFLUX DISEASE, UNSPECIFIED WHETHER ESOPHAGITIS PRESENT: ICD-10-CM

## 2024-11-12 DIAGNOSIS — R11.0 NAUSEA: ICD-10-CM

## 2024-11-12 DIAGNOSIS — R10.13 EPIGASTRIC PAIN: Primary | ICD-10-CM

## 2024-11-12 PROCEDURE — 99214 OFFICE O/P EST MOD 30 MIN: CPT | Performed by: INTERNAL MEDICINE

## 2024-11-12 PROCEDURE — 1159F MED LIST DOCD IN RCRD: CPT | Performed by: INTERNAL MEDICINE

## 2024-11-12 PROCEDURE — 3078F DIAST BP <80 MM HG: CPT | Performed by: INTERNAL MEDICINE

## 2024-11-12 PROCEDURE — 1160F RVW MEDS BY RX/DR IN RCRD: CPT | Performed by: INTERNAL MEDICINE

## 2024-11-12 PROCEDURE — 3077F SYST BP >= 140 MM HG: CPT | Performed by: INTERNAL MEDICINE

## 2024-11-12 RX ORDER — SODIUM CHLORIDE, SODIUM LACTATE, POTASSIUM CHLORIDE, CALCIUM CHLORIDE 600; 310; 30; 20 MG/100ML; MG/100ML; MG/100ML; MG/100ML
30 INJECTION, SOLUTION INTRAVENOUS CONTINUOUS
OUTPATIENT
Start: 2024-11-12 | End: 2024-11-12

## 2024-11-12 NOTE — PROGRESS NOTES
Chief Complaint   Patient presents with    Nausea    Heartburn        Laura Cerda is a  64 y.o. female here for a follow up visit for GERD, nausea, epigastric pain    HPI this 64-year-old female patient of JYOTI Harrington presents in follow-up since last seen in May of this year.  Her history of reflux and previous endoscopic evaluation were reviewed and she does have some persistent symptoms including epigastric pain and nausea.  She is on pantoprazole for her reflux.  The last upper endoscopy in 2018 had defined esophageal ulcers and follow-up established they were healed at that time.  She also recalls having previous workup including a gastric emptying study although those records are not readily available.  She remembers being treated with a prokinetic agent but was intolerant to this.  I advised we repeat her endoscopy now to assess the anatomy and if necessary consider a gastric emptying study to establish performance.  She is amenable to this.    Past Medical History:   Diagnosis Date    Abnormal Pap smear of cervix 1994?    Anemia during pregnancies    Arthritis     psoriatic    Asthma     sometimes    Cataract 2017    surgery    Chest pain     Cholelithiasis 2001    Chronic kidney disease     Colon polyp 2004    COVID-19 04/15/2022    Diabetes mellitus 2019    Diverticulitis of colon     Diverticulosis 2004    Resection colon surgery Sept.6th 2022    Elevated PTHrP level     Fatigue     Fatty liver 2022    Fibromyalgia     Fibromyalgia, primary     Foot swelling     GERD (gastroesophageal reflux disease)     Gout     Hernia 2oo1    History of Holter monitoring 08/22/2016    Hyperlipidemia     Hyperparathyroidism     Hypertension     Hypothyroidism     Joint pain     worsening    Meniere's disease     Nausea     Osteoarthritis     Ovarian cyst 2002 left ovary    shrink with birth control pills    Palpitations     PONV (postoperative nausea and vomiting)     Postmenopausal     Psoriatic arthritis      Rapid heart rate     Raynaud disease     Thyroid disease     Urinary tract infection     Varicella     Vitamin D deficiency        Current Outpatient Medications   Medication Sig Dispense Refill    acetaminophen (TYLENOL) 500 MG tablet Take 1 tablet by mouth As Needed for Mild Pain.      allopurinol (ZYLOPRIM) 100 MG tablet TAKE 1 TABLET BY MOUTH EVERY DAY 90 tablet 1    atorvastatin (LIPITOR) 20 MG tablet Take 1 tablet by mouth Every Night. 90 tablet 1    fluticasone (FLONASE) 50 MCG/ACT nasal spray SPRAY 2 SPRAYS INTO THE NOSTRIL AS DIRECTED BY PROVIDER DAILY. 16 mL 1    Jardiance 10 MG tablet tablet TAKE 1 TABLET BY MOUTH EVERY DAY 90 tablet 0    levothyroxine (Synthroid) 75 MCG tablet Take 1 tablet by mouth Daily. 90 tablet 0    Multiple Vitamins-Minerals (MULTIVITAMIN ADULT PO) Take 1 tablet by mouth Daily.      pantoprazole (PROTONIX) 40 MG EC tablet Take 1 tablet by mouth Daily. 90 tablet 3    Secukinumab (COSENTYX IV) Infuse  into a venous catheter Every 30 (Thirty) Days.      sertraline (ZOLOFT) 50 MG tablet TAKE 1 TABLET BY MOUTH EVERY DAY IN THE EVENING 90 tablet 1    tiZANidine (ZANAFLEX) 2 MG tablet Take 1 tablet by mouth At Night As Needed for Muscle Spasms.      Unithroid 75 MCG tablet TAKE 1 TABLET BY MOUTH EVERY DAY 90 tablet 1    vitamin D (ERGOCALCIFEROL) 1.25 MG (60190 UT) capsule capsule 1 po qweek 24 capsule 1    hydrOXYzine (ATARAX) 25 MG tablet Take 1 tablet by mouth Every 8 (Eight) Hours As Needed for Anxiety (for sleep). (Patient not taking: Reported on 11/12/2024) 90 tablet 1    Semaglutide,0.25 or 0.5MG/DOS, (OZEMPIC) 2 MG/1.5ML solution pen-injector Inject 0.25 mg under the skin into the appropriate area as directed 1 (One) Time Per Week. (Patient not taking: Reported on 11/12/2024) 1 mL 0     No current facility-administered medications for this visit.       PRN Meds:.    Allergies   Allergen Reactions    Augmentin [Amoxicillin-Pot Clavulanate] Hives and Shortness Of Breath      Temperature high    Azithromycin Rash    Ceclor [Cefaclor] Rash    Lyrica [Pregabalin] Swelling    Metformin GI Intolerance    Penicillin G Other (See Comments)     Does not take because of augmentin       Social History     Socioeconomic History    Marital status:     Number of children: 2   Tobacco Use    Smoking status: Never     Passive exposure: Never    Smokeless tobacco: Never   Vaping Use    Vaping status: Never Used   Substance and Sexual Activity    Alcohol use: Never    Drug use: Never    Sexual activity: Yes     Partners: Male     Birth control/protection: Post-menopausal, Tubal ligation     Comment: Tubal       Family History   Problem Relation Age of Onset    Heart failure Mother     Hypertension Mother     Diabetes Mother     Inflammatory bowel disease Mother     Diverticulitis Mother     Breast cancer Mother     Lung cancer Mother     Arthritis Mother     Asthma Mother     COPD Mother     Heart disease Mother     Stroke Mother         2020    Cancer Father         Bladder    Diabetes Father     Hearing loss Father     Pulmonary fibrosis Father     Arthritis Brother     Heart disease Maternal Grandmother     Stroke Maternal Grandmother     Inflammatory bowel disease Maternal Grandmother     Diverticulitis Maternal Grandmother     Heart disease Maternal Grandfather     Colon cancer Paternal Grandmother     Ovarian cancer Neg Hx     Uterine cancer Neg Hx     Malig Hyperthermia Neg Hx        Review of Systems   Constitutional:  Negative for activity change, appetite change, fatigue and unexpected weight change.   HENT:  Negative for congestion, facial swelling, sore throat, trouble swallowing and voice change.    Eyes:  Negative for photophobia and visual disturbance.   Respiratory:  Negative for cough and choking.    Cardiovascular:  Negative for chest pain.   Gastrointestinal:  Positive for abdominal pain and nausea. Negative for abdominal distention, anal bleeding, blood in stool, constipation,  diarrhea, rectal pain and vomiting.        GERD   Endocrine: Negative for polyphagia.   Musculoskeletal:  Negative for arthralgias, gait problem and joint swelling.   Skin:  Negative for color change, pallor and rash.   Allergic/Immunologic: Negative for food allergies.   Neurological:  Negative for speech difficulty and headaches.   Hematological:  Does not bruise/bleed easily.   Psychiatric/Behavioral:  Negative for agitation, confusion and sleep disturbance.        Vitals:    11/12/24 1000   BP: 141/75   Pulse: 88   Temp: 97.9 °F (36.6 °C)   SpO2: 94%       Physical Exam  Constitutional:       Appearance: She is well-developed.   HENT:      Head: Normocephalic.   Eyes:      Conjunctiva/sclera: Conjunctivae normal.   Cardiovascular:      Rate and Rhythm: Normal rate and regular rhythm.   Pulmonary:      Breath sounds: Normal breath sounds.   Abdominal:      General: Bowel sounds are normal.      Palpations: Abdomen is soft.   Musculoskeletal:         General: Normal range of motion.      Cervical back: Normal range of motion.   Skin:     General: Skin is warm and dry.   Neurological:      Mental Status: She is alert and oriented to person, place, and time.   Psychiatric:         Behavior: Behavior normal.         ASSESSMENT   #1 GERD  #2 epigastric pain  #3 nausea  #4 remote family history of colon cancer      PLAN  Schedule EGD  Pending results may consider gastric emptying study  Continue current medical regimen      ICD-10-CM ICD-9-CM   1. Epigastric pain  R10.13 789.06   2. Nausea  R11.0 787.02   3. Gastroesophageal reflux disease, unspecified whether esophagitis present  K21.9 530.81

## 2024-11-12 NOTE — TELEPHONE ENCOUNTER
HEBER PRADO  for EGD on 01/03/2025  arrive at  8AM  . Mailed Prep instructions to Mailing address on-file. OK FOR HUB TO READ

## 2024-12-09 ENCOUNTER — OFFICE VISIT (OUTPATIENT)
Dept: FAMILY MEDICINE CLINIC | Facility: CLINIC | Age: 64
End: 2024-12-09
Payer: MEDICARE

## 2024-12-09 VITALS
BODY MASS INDEX: 45.16 KG/M2 | SYSTOLIC BLOOD PRESSURE: 136 MMHG | HEART RATE: 78 BPM | WEIGHT: 245.4 LBS | TEMPERATURE: 98.2 F | OXYGEN SATURATION: 98 % | DIASTOLIC BLOOD PRESSURE: 86 MMHG | HEIGHT: 62 IN

## 2024-12-09 DIAGNOSIS — Z23 IMMUNIZATION DUE: ICD-10-CM

## 2024-12-09 DIAGNOSIS — M54.40 LOW BACK PAIN WITH SCIATICA, SCIATICA LATERALITY UNSPECIFIED, UNSPECIFIED BACK PAIN LATERALITY, UNSPECIFIED CHRONICITY: ICD-10-CM

## 2024-12-09 DIAGNOSIS — Z92.25 HISTORY OF IMMUNOSUPPRESSION THERAPY: ICD-10-CM

## 2024-12-09 DIAGNOSIS — Z23 NEED FOR IMMUNIZATION AGAINST INFLUENZA: ICD-10-CM

## 2024-12-09 DIAGNOSIS — J30.89 ENVIRONMENTAL AND SEASONAL ALLERGIES: ICD-10-CM

## 2024-12-09 DIAGNOSIS — L40.59 POLYARTICULAR PSORIATIC ARTHRITIS: ICD-10-CM

## 2024-12-09 DIAGNOSIS — I10 PRIMARY HYPERTENSION: Primary | ICD-10-CM

## 2024-12-09 DIAGNOSIS — E11.8 CONTROLLED TYPE 2 DIABETES MELLITUS WITH COMPLICATION, WITHOUT LONG-TERM CURRENT USE OF INSULIN: ICD-10-CM

## 2024-12-09 DIAGNOSIS — M54.2 NECK PAIN: ICD-10-CM

## 2024-12-09 DIAGNOSIS — E78.00 PURE HYPERCHOLESTEROLEMIA: ICD-10-CM

## 2024-12-09 PROCEDURE — 99214 OFFICE O/P EST MOD 30 MIN: CPT | Performed by: NURSE PRACTITIONER

## 2024-12-09 PROCEDURE — 3079F DIAST BP 80-89 MM HG: CPT | Performed by: NURSE PRACTITIONER

## 2024-12-09 PROCEDURE — 90677 PCV20 VACCINE IM: CPT | Performed by: NURSE PRACTITIONER

## 2024-12-09 PROCEDURE — 3044F HG A1C LEVEL LT 7.0%: CPT | Performed by: NURSE PRACTITIONER

## 2024-12-09 PROCEDURE — 1125F AMNT PAIN NOTED PAIN PRSNT: CPT | Performed by: NURSE PRACTITIONER

## 2024-12-09 PROCEDURE — G0009 ADMIN PNEUMOCOCCAL VACCINE: HCPCS | Performed by: NURSE PRACTITIONER

## 2024-12-09 PROCEDURE — 3075F SYST BP GE 130 - 139MM HG: CPT | Performed by: NURSE PRACTITIONER

## 2024-12-09 NOTE — PROGRESS NOTES
"Chief Complaint  Controlled type 2 diabetes mellitus with complication, with (Follow up/FASTING-due A1C, labs from rheumatologist-ESR, CRP, TB Quantiferon (L40.59, Z92.25)//)    Subjective        Laura Cerda presents to [facility]  History of Present Illness  Patient presents the office today for follow-up on chronic conditions including hypertension.  Pressure is controlled 136/86.  Patient has chronic neck and back pain and following physical therapy.  Patient is following rheumatology every 4 months with polyarticular psoriatic arthritis.  She has controlled type 2 diabetes mellitus checking updated labs including A1c today.  She is up-to-date on flu shot due for pneumonia vaccine.      Objective   Vital Signs:  /86 (BP Location: Left arm, Patient Position: Sitting, Cuff Size: Large Adult)   Pulse 78   Temp 98.2 °F (36.8 °C)   Ht 157 cm (61.81\")   Wt 111 kg (245 lb 6.4 oz)   SpO2 98%   BMI 45.16 kg/m²   Estimated body mass index is 45.16 kg/m² as calculated from the following:    Height as of this encounter: 157 cm (61.81\").    Weight as of this encounter: 111 kg (245 lb 6.4 oz).            Physical Exam  Constitutional:       General: She is not in acute distress.     Appearance: Normal appearance.   HENT:      Head: Normocephalic.   Eyes:      Pupils: Pupils are equal, round, and reactive to light.   Cardiovascular:      Rate and Rhythm: Normal rate.      Pulses: Normal pulses.      Heart sounds: Normal heart sounds.   Pulmonary:      Effort: Pulmonary effort is normal.      Breath sounds: Normal breath sounds.   Musculoskeletal:         General: Normal range of motion.      Cervical back: Normal range of motion and neck supple.   Skin:     General: Skin is warm.   Neurological:      General: No focal deficit present.      Mental Status: She is alert and oriented to person, place, and time.   Psychiatric:         Mood and Affect: Mood normal.         Behavior: Behavior " normal.         Thought Content: Thought content normal.         Judgment: Judgment normal.        Result Review :  The following data was reviewed by: JYOTI Harrington on 12/09/2024:  Common labs          4/30/2024    12:12 8/2/2024    10:06 9/9/2024    11:10   Common Labs   Glucose 96  99     BUN 14  14     Creatinine 0.96  1.02     Sodium 143  141     Potassium 4.9  4.9     Chloride 101  101     Calcium 9.9  10.2     Total Protein 7.6  7.3     Albumin 4.5  4.6     Total Bilirubin 0.4  0.4     Alkaline Phosphatase 164  167  152    AST (SGOT) 33  31     ALT (SGPT) 29  33     WBC 9.4  11.62  8.9    Hemoglobin 13.8  14.3  14.6    Hematocrit 44.4  45.8  46.6    Platelets 204  233  211    Total Cholesterol 197  202     Triglycerides 151  128     HDL Cholesterol 57  70     LDL Cholesterol  114  110     Hemoglobin A1C 6.5  6.30     Microalbumin, Urine 4.1      Uric Acid 4.7        Data reviewed : Radiologic studies knee xray            Assessment and Plan   Diagnoses and all orders for this visit:    1. Primary hypertension (Primary)  -     Comprehensive Metabolic Panel    2. Need for immunization against influenza    3. Environmental and seasonal allergies  -     CBC & Differential    4. Pure hypercholesterolemia  -     Lipid Panel    5. Controlled type 2 diabetes mellitus with complication, without long-term current use of insulin  -     Hemoglobin A1c    6. Polyarticular psoriatic arthritis  -     Sedimentation Rate  -     C-reactive protein  -     QuantiFERON TB Gold    7. History of immunosuppression therapy  -     Sedimentation Rate  -     C-reactive protein  -     QuantiFERON TB Gold    8. Neck pain    9. Low back pain with sciatica, sciatica laterality unspecified, unspecified back pain laterality, unspecified chronicity    10. Immunization due  -     QuantiFERON TB Gold  -     Pneumococcal Conjugate Vaccine 20-Valent (PCV20)      Neck pain and low back pain with sciatica following physical therapy with  improvement.     I spent 30 minutes caring for Laura on this date of service. This time includes time spent by me in the following activities:preparing for the visit, reviewing tests, obtaining and/or reviewing a separately obtained history, performing a medically appropriate examination and/or evaluation , counseling and educating the patient/family/caregiver, ordering medications, tests, or procedures, documenting information in the medical record, independently interpreting results and communicating that information with the patient/family/caregiver, and care coordination  Follow Up   No follow-ups on file.  Patient was given instructions and counseling regarding her condition or for health maintenance advice. Please see specific information pulled into the AVS if appropriate.

## 2024-12-11 LAB
ALBUMIN SERPL-MCNC: 4.1 G/DL (ref 3.9–4.9)
ALP SERPL-CCNC: 133 IU/L (ref 44–121)
ALT SERPL-CCNC: 23 IU/L (ref 0–32)
AST SERPL-CCNC: 25 IU/L (ref 0–40)
BASOPHILS # BLD AUTO: 0.1 X10E3/UL (ref 0–0.2)
BASOPHILS NFR BLD AUTO: 1 %
BILIRUB SERPL-MCNC: 0.5 MG/DL (ref 0–1.2)
BUN SERPL-MCNC: 13 MG/DL (ref 8–27)
BUN/CREAT SERPL: 14 (ref 12–28)
CALCIUM SERPL-MCNC: 9.4 MG/DL (ref 8.7–10.3)
CHLORIDE SERPL-SCNC: 102 MMOL/L (ref 96–106)
CHOLEST SERPL-MCNC: 182 MG/DL (ref 100–199)
CO2 SERPL-SCNC: 28 MMOL/L (ref 20–29)
CREAT SERPL-MCNC: 0.92 MG/DL (ref 0.57–1)
CRP SERPL-MCNC: 3 MG/L (ref 0–10)
EGFRCR SERPLBLD CKD-EPI 2021: 69 ML/MIN/1.73
EOSINOPHIL # BLD AUTO: 0.2 X10E3/UL (ref 0–0.4)
EOSINOPHIL NFR BLD AUTO: 3 %
ERYTHROCYTE [DISTWIDTH] IN BLOOD BY AUTOMATED COUNT: 14.5 % (ref 11.7–15.4)
ERYTHROCYTE [SEDIMENTATION RATE] IN BLOOD BY WESTERGREN METHOD: 30 MM/HR (ref 0–40)
GAMMA INTERFERON BACKGROUND BLD IA-ACNC: 0.01 IU/ML
GLOBULIN SER CALC-MCNC: 3.1 G/DL (ref 1.5–4.5)
GLUCOSE SERPL-MCNC: 95 MG/DL (ref 70–99)
HBA1C MFR BLD: 6.4 % (ref 4.8–5.6)
HCT VFR BLD AUTO: 41.8 % (ref 34–46.6)
HDLC SERPL-MCNC: 60 MG/DL
HGB BLD-MCNC: 13.1 G/DL (ref 11.1–15.9)
IMM GRANULOCYTES # BLD AUTO: 0 X10E3/UL (ref 0–0.1)
IMM GRANULOCYTES NFR BLD AUTO: 0 %
LDLC SERPL CALC-MCNC: 101 MG/DL (ref 0–99)
LYMPHOCYTES # BLD AUTO: 2.1 X10E3/UL (ref 0.7–3.1)
LYMPHOCYTES NFR BLD AUTO: 25 %
M TB IFN-G BLD-IMP: NEGATIVE
M TB IFN-G CD4+ BCKGRND COR BLD-ACNC: 0.01 IU/ML
M TB IFN-G CD4+CD8+ BCKGRND COR BLD-ACNC: 0.01 IU/ML
MCH RBC QN AUTO: 26.5 PG (ref 26.6–33)
MCHC RBC AUTO-ENTMCNC: 31.3 G/DL (ref 31.5–35.7)
MCV RBC AUTO: 84 FL (ref 79–97)
MITOGEN IGNF BCKGRD COR BLD-ACNC: >10 IU/ML
MONOCYTES # BLD AUTO: 0.5 X10E3/UL (ref 0.1–0.9)
MONOCYTES NFR BLD AUTO: 6 %
NEUTROPHILS # BLD AUTO: 5.4 X10E3/UL (ref 1.4–7)
NEUTROPHILS NFR BLD AUTO: 65 %
PLATELET # BLD AUTO: 210 X10E3/UL (ref 150–450)
POTASSIUM SERPL-SCNC: 4.2 MMOL/L (ref 3.5–5.2)
PROT SERPL-MCNC: 7.2 G/DL (ref 6–8.5)
QUANTIFERON INCUBATION: NORMAL
RBC # BLD AUTO: 4.95 X10E6/UL (ref 3.77–5.28)
SERVICE CMNT-IMP: NORMAL
SODIUM SERPL-SCNC: 142 MMOL/L (ref 134–144)
TRIGL SERPL-MCNC: 118 MG/DL (ref 0–149)
VLDLC SERPL CALC-MCNC: 21 MG/DL (ref 5–40)
WBC # BLD AUTO: 8.3 X10E3/UL (ref 3.4–10.8)

## 2025-01-03 ENCOUNTER — HOSPITAL ENCOUNTER (OUTPATIENT)
Facility: HOSPITAL | Age: 65
Setting detail: HOSPITAL OUTPATIENT SURGERY
Discharge: HOME | End: 2025-01-03
Attending: INTERNAL MEDICINE | Admitting: INTERNAL MEDICINE
Payer: MEDICARE

## 2025-01-03 ENCOUNTER — ANESTHESIA (OUTPATIENT)
Dept: GASTROENTEROLOGY | Facility: HOSPITAL | Age: 65
End: 2025-01-03
Payer: MEDICARE

## 2025-01-03 ENCOUNTER — ANESTHESIA EVENT (OUTPATIENT)
Dept: GASTROENTEROLOGY | Facility: HOSPITAL | Age: 65
End: 2025-01-03
Payer: MEDICARE

## 2025-01-03 VITALS
SYSTOLIC BLOOD PRESSURE: 108 MMHG | RESPIRATION RATE: 18 BRPM | WEIGHT: 242.8 LBS | BODY MASS INDEX: 44.68 KG/M2 | HEIGHT: 62 IN | DIASTOLIC BLOOD PRESSURE: 54 MMHG | OXYGEN SATURATION: 100 % | HEART RATE: 60 BPM

## 2025-01-03 DIAGNOSIS — R11.0 NAUSEA: ICD-10-CM

## 2025-01-03 DIAGNOSIS — K21.9 GASTROESOPHAGEAL REFLUX DISEASE, UNSPECIFIED WHETHER ESOPHAGITIS PRESENT: ICD-10-CM

## 2025-01-03 DIAGNOSIS — R10.13 EPIGASTRIC PAIN: ICD-10-CM

## 2025-01-03 LAB — GLUCOSE BLDC GLUCOMTR-MCNC: 103 MG/DL (ref 70–130)

## 2025-01-03 PROCEDURE — 25010000002 PROPOFOL 1000 MG/100ML EMULSION

## 2025-01-03 PROCEDURE — 25010000002 LIDOCAINE 2% SOLUTION

## 2025-01-03 PROCEDURE — 25010000002 ONDANSETRON PER 1 MG

## 2025-01-03 PROCEDURE — 25810000003 SODIUM CHLORIDE 0.9 % SOLUTION: Performed by: INTERNAL MEDICINE

## 2025-01-03 PROCEDURE — 82948 REAGENT STRIP/BLOOD GLUCOSE: CPT

## 2025-01-03 PROCEDURE — 43239 EGD BIOPSY SINGLE/MULTIPLE: CPT | Performed by: INTERNAL MEDICINE

## 2025-01-03 PROCEDURE — S0260 H&P FOR SURGERY: HCPCS | Performed by: INTERNAL MEDICINE

## 2025-01-03 PROCEDURE — 88305 TISSUE EXAM BY PATHOLOGIST: CPT | Performed by: INTERNAL MEDICINE

## 2025-01-03 PROCEDURE — 25010000002 GLYCOPYRROLATE 0.2 MG/ML SOLUTION

## 2025-01-03 PROCEDURE — 25010000002 PROPOFOL 10 MG/ML EMULSION

## 2025-01-03 RX ORDER — LIDOCAINE HYDROCHLORIDE 20 MG/ML
INJECTION, SOLUTION INFILTRATION; PERINEURAL AS NEEDED
Status: DISCONTINUED | OUTPATIENT
Start: 2025-01-03 | End: 2025-01-03 | Stop reason: SURG

## 2025-01-03 RX ORDER — GLYCOPYRROLATE 0.2 MG/ML
INJECTION INTRAMUSCULAR; INTRAVENOUS AS NEEDED
Status: DISCONTINUED | OUTPATIENT
Start: 2025-01-03 | End: 2025-01-03 | Stop reason: SURG

## 2025-01-03 RX ORDER — ONDANSETRON 2 MG/ML
INJECTION INTRAMUSCULAR; INTRAVENOUS AS NEEDED
Status: DISCONTINUED | OUTPATIENT
Start: 2025-01-03 | End: 2025-01-03 | Stop reason: SURG

## 2025-01-03 RX ORDER — LANOLIN ALCOHOL/MO/W.PET/CERES
1000 CREAM (GRAM) TOPICAL DAILY
COMMUNITY

## 2025-01-03 RX ORDER — PROPOFOL 10 MG/ML
INJECTION, EMULSION INTRAVENOUS AS NEEDED
Status: DISCONTINUED | OUTPATIENT
Start: 2025-01-03 | End: 2025-01-03 | Stop reason: SURG

## 2025-01-03 RX ORDER — SODIUM CHLORIDE 9 MG/ML
30 INJECTION, SOLUTION INTRAVENOUS CONTINUOUS PRN
Status: DISCONTINUED | OUTPATIENT
Start: 2025-01-03 | End: 2025-01-03 | Stop reason: HOSPADM

## 2025-01-03 RX ORDER — SODIUM CHLORIDE, SODIUM LACTATE, POTASSIUM CHLORIDE, CALCIUM CHLORIDE 600; 310; 30; 20 MG/100ML; MG/100ML; MG/100ML; MG/100ML
30 INJECTION, SOLUTION INTRAVENOUS CONTINUOUS
Status: DISCONTINUED | OUTPATIENT
Start: 2025-01-03 | End: 2025-01-03

## 2025-01-03 RX ADMIN — LIDOCAINE HYDROCHLORIDE 100 MG: 20 INJECTION, SOLUTION INFILTRATION; PERINEURAL at 09:25

## 2025-01-03 RX ADMIN — PROPOFOL 200 MCG/KG/MIN: 10 INJECTION, EMULSION INTRAVENOUS at 09:25

## 2025-01-03 RX ADMIN — SODIUM CHLORIDE 30 ML/HR: 9 INJECTION, SOLUTION INTRAVENOUS at 09:16

## 2025-01-03 RX ADMIN — PROPOFOL INJECTABLE EMULSION 150 MG: 10 INJECTION, EMULSION INTRAVENOUS at 09:25

## 2025-01-03 RX ADMIN — ONDANSETRON 4 MG: 2 INJECTION INTRAMUSCULAR; INTRAVENOUS at 09:25

## 2025-01-03 RX ADMIN — GLYCOPYRROLATE 0.1 MG: 0.2 INJECTION INTRAMUSCULAR; INTRAVENOUS at 09:25

## 2025-01-03 NOTE — DISCHARGE INSTRUCTIONS
For the next 24 hours patient needs to be with a responsible adult.    For 24 hours DO NOT drive, operate machinery, appliances, drink alcohol, make important decisions or sign legal documents.    Start with a light or bland diet if you are feeling sick to your stomach otherwise advance to regular diet as tolerated.    Follow recommendations on procedure report if provided by your doctor.    Call Dr Rodríguez for problems .    Problems may include but not limited to: large amounts of bleeding, trouble breathing, repeated vomiting, severe unrelieved pain, fever or chills.

## 2025-01-03 NOTE — ANESTHESIA POSTPROCEDURE EVALUATION
"Patient: Laura Cerda    Procedure Summary       Date: 01/03/25 Room / Location:  [facility]    Anesthesia Start: 0918 Anesthesia Stop: 0940    Procedure: ESOPHAGOGASTRODUODENOSCOPY with cold forcep biopsies (Esophagus) Diagnosis:       Esophagitis      Gastritis      Duodenogastric bile reflux      Duodenitis      Hiatal hernia      (Epigastric pain [R10.13])      (Nausea [R11.0])      (Gastroesophageal reflux disease, unspecified whether esophagitis present [K21.9])    Surgeons: Reji Rodríguez MD Provider: Jamie Lopez DO    Anesthesia Type: MAC ASA Status: 3            Anesthesia Type: MAC    Vitals  Vitals Value Taken Time   /54 01/03/25 0959   Temp     Pulse 73 01/03/25 1002   Resp 18 01/03/25 0958   SpO2 100 % 01/03/25 1002   Vitals shown include unfiled device data.        Post Anesthesia Care and Evaluation    Patient location during evaluation: bedside  Patient participation: complete - patient participated  Level of consciousness: awake and alert  Pain management: adequate    Airway patency: patent  Anesthetic complications: No anesthetic complications  PONV Status: controlled  Cardiovascular status: acceptable and hemodynamically stable  Respiratory status: acceptable, spontaneous ventilation and nonlabored ventilation  Hydration status: acceptable    Comments: /54 (BP Location: Left arm, Patient Position: Lying)   Pulse 60   Resp 18   Ht 157.5 cm (62\")   Wt 110 kg (242 lb 12.8 oz)   SpO2 100%   BMI 44.41 kg/m²       "

## 2025-01-03 NOTE — H&P
[facility]  Pre Procedure History & Physical    Chief Complaint:   GERD, epigastric pain, nausea    Subjective     HPI:   This 64-year-old female presents the endoscopy suite for upper endoscopic evaluation.  She has had issues with reflux, nausea, and epigastric pain.  Last upper endoscopy performed in 2018.    Past Medical History:   Past Medical History:   Diagnosis Date    Abnormal Pap smear of cervix ?    Allergic     Anemia during pregnancies    Arthritis     psoriatic    Asthma     sometimes    Chest pain     Chronic kidney disease     Colon polyp 2004    COVID-19 04/15/2022    Diabetes mellitus 2019    Diverticulitis of colon     Diverticulosis 2004    Resection colon surgery     Elevated PTHrP level     Fatigue     Fatty liver     Fibromyalgia     Foot swelling     GERD (gastroesophageal reflux disease)     Gout     Hernia 2oo1    History of gastric ulcer     History of Holter monitoring 2016    Hyperlipidemia     Hyperparathyroidism     Hypertension     Hypothyroidism     Joint pain     worsening    Low back pain     Meniere's disease     Nausea     Osteoarthritis     Ovarian cyst 2002 left ovary    shrink with birth control pills    Palpitations     PONV (postoperative nausea and vomiting)     Postmenopausal     Psoriatic arthritis     Rapid heart rate     Raynaud disease     Thyroid disease     Urinary tract infection     Varicella     Vitamin D deficiency        Past Surgical History:  Past Surgical History:   Procedure Laterality Date    ABDOMINAL SURGERY      Section of my colon removed    BREAST BIOPSY Right 2013    benign    CATARACT EXTRACTION  17;17    CERVICAL BIOPSY  W/ LOOP ELECTRODE EXCISION  ?    i think this was done     SECTION       SECTION       SECTION WITH TUBAL  1985    COLON RESECTION N/A 2022    Procedure: LAPAROSCOPIC LOW ANTERIOR COLON RESECTION;  Surgeon: Charmaine  MD Chris;  Location:  [facility];  Service: General;  Laterality: N/A;    COLONOSCOPY N/A 08/24/2018    Procedure: COLONOSCOPY INTO CECUM AND TERMINAL ILEUM WTIH COLD BIOPSIES;  Surgeon: Reji Rodríguez MD;  Location:  [facility];  Service: Gastroenterology    COLONOSCOPY N/A 05/11/2023    Procedure: COLONOSCOPY WITH RANDOM BIOPSIES;  Surgeon: Chris Montano MD;  Location:  [facility];  Service: Gastroenterology;  Laterality: N/A;  random colon biopsies    ENDOSCOPY N/A 08/24/2018    Procedure: ESOPHAGOGASTRODUODENOSCOPY WITH BIOPSIES;  Surgeon: Reji Rodríguez MD;  Location:  [facility];  Service: Gastroenterology    ENDOSCOPY N/A 11/30/2018    Procedure: ESOPHAGOGASTRODUODENOSCOPY;  Surgeon: Reji Rodríguez MD;  Location: [facility];  Service: Gastroenterology    HEMORRHOIDECTOMY      JOINT REPLACEMENT  2/2020    Right knee    LAPAROSCOPIC CHOLECYSTECTOMY  2001    REPLACEMENT TOTAL KNEE Right 02/2020    SMALL INTESTINE SURGERY  Sept.6th 2022    Resection of sigmoid Colon and rectall    TUBAL ABDOMINAL LIGATION      WISDOM TOOTH EXTRACTION         Family History:  Family History   Problem Relation Age of Onset    Heart failure Mother     Hypertension Mother     Diabetes Mother     Inflammatory bowel disease Mother     Diverticulitis Mother     Breast cancer Mother     Lung cancer Mother     Arthritis Mother     Asthma Mother     COPD Mother     Heart disease Mother     Stroke Mother         2020    Cancer Father         Bladder    Diabetes Father     Hearing loss Father     Pulmonary fibrosis Father     Arthritis Brother     Heart disease Maternal Grandmother     Stroke Maternal Grandmother     Inflammatory bowel disease Maternal Grandmother     Diverticulitis Maternal Grandmother     Heart disease Maternal Grandfather     Colon cancer Paternal Grandmother     Ovarian cancer Neg Hx     Uterine cancer Neg Hx     Malig Hyperthermia Neg Hx        Social History:   reports that  she has never smoked. She has never been exposed to tobacco smoke. She has never used smokeless tobacco. She reports that she does not drink alcohol and does not use drugs.    Medications:   No medications prior to admission.       Allergies:  Augmentin [amoxicillin-pot clavulanate], Azithromycin, Ceclor [cefaclor], Lyrica [pregabalin], Metformin, and Penicillin g    ROS:    Pertinent items are noted in HPI, all other systems reviewed and negative     Objective     not currently breastfeeding.    Physical Exam   Constitutional: Pt is oriented to person, place, and time and well-developed, well-nourished, and in no distress.   Mouth/Throat: Oropharynx is clear and moist.   Neck: Normal range of motion.   Cardiovascular: Normal rate, regular rhythm and normal heart sounds.    Pulmonary/Chest: Effort normal and breath sounds normal.   Abdominal: Soft. Nontender  Skin: Skin is warm and dry.   Psychiatric: Mood, memory, affect and judgment normal.     Assessment & Plan     Diagnosis:  GERD  Epigastric pain  Nausea    Anticipated Surgical Procedure:  EGD    The risks, benefits, and alternatives of this procedure have been discussed with the patient or the responsible party- the patient understands and agrees to proceed.

## 2025-01-03 NOTE — ANESTHESIA PREPROCEDURE EVALUATION
Anesthesia Evaluation     Patient summary reviewed   history of anesthetic complications:  PONV  NPO Solid Status: > 8 hours  NPO Liquid Status: > 2 hours           Airway   Mallampati: II  TM distance: >3 FB  Neck ROM: full  No difficulty expected  Dental      Pulmonary     breath sounds clear to auscultation  (+) asthma,  (-) shortness of breath, recent URI, not a smokerSleep apnea: STOP BANG 4.  Cardiovascular   Exercise tolerance: good (4-7 METS)    Rhythm: regular  Rate: normal    (+) hypertension, hyperlipidemia  (-) past MI, dysrhythmias, angina      Neuro/Psych  (-) seizures, CVA  GI/Hepatic/Renal/Endo    (+) morbid obesity, GERD, PUD (h/o), liver disease fatty liver disease, renal disease- CRI, diabetes mellitus type 2, thyroid problem hypothyroidism    ROS Comment:  H/o Colon polyps; s/p 9/2022 partial colectomy (sigmoid/rectal)    Musculoskeletal     (+) back pain, myalgias (Fibro), neck pain  (-) neck stiffness  Abdominal    Substance History      OB/GYN          Other   arthritis,                       Anesthesia Plan    ASA 3     MAC     (MAC anesthesia discussed with patient and/or patient representative. Risks (including but not limited to intra-op awareness), benefits, and alternatives were discussed. Understanding was voiced with an agreement to proceed with a MAC technique and General as a backup option. )    Anesthetic plan, risks, benefits, and alternatives have been provided, discussed and informed consent has been obtained with: patient.        CODE STATUS:

## 2025-01-20 RX ORDER — LEVOTHYROXINE SODIUM 75 UG/1
75 TABLET ORAL DAILY
Qty: 90 TABLET | Refills: 0 | Status: SHIPPED | OUTPATIENT
Start: 2025-01-20

## 2025-01-28 ENCOUNTER — TELEPHONE (OUTPATIENT)
Dept: GASTROENTEROLOGY | Facility: CLINIC | Age: 65
End: 2025-01-28
Payer: MEDICARE

## 2025-01-28 DIAGNOSIS — R11.0 NAUSEA: ICD-10-CM

## 2025-01-28 DIAGNOSIS — R10.13 EPIGASTRIC PAIN: Primary | ICD-10-CM

## 2025-01-28 DIAGNOSIS — R11.2 NAUSEA AND VOMITING, UNSPECIFIED VOMITING TYPE: ICD-10-CM

## 2025-01-28 NOTE — TELEPHONE ENCOUNTER
----- Message from Reji Rodríguez sent at 1/25/2025  2:01 PM EST -----  Regarding: Biopsy results  Okay to call results, recommend continue PPI.  Patient needs gastric emptying study because of nausea with vomiting.  ----- Message -----  From: Lab, Background User  Sent: 1/7/2025   2:16 PM EST  To: Reji SINGH MD      ina Diagnosis   1.   Duodenum, biopsy:         A.  Duodenal mucosa with normal villous architecture and no significant histopathologic changes.     2.  Stomach, antrum, biopsy:         A.  Gastric antral and oxyntic mucosa with mild chronic inactive gastritis.         B.  Negative for intestinal metaplasia.         C.  Negative for H. pylori-type organisms (H&E stain).     3.  Gastroesophageal junction, biopsy:         A.  Squamous mucosa with spongiosis, increased intraepithelial lymphocytes, and few intraepithelial eosinophils   (up to 3 per high-power field), consistent with reflux esophagitis.         B.  Adjoining columnar mucosa with focal acute carditis.         C.  Negative for intestinal metaplasia.

## 2025-01-30 NOTE — TELEPHONE ENCOUNTER
Called pt and advised of the below path and of Dr Rodríguez's recommendations.   Verb understanding.     Pt would like to proceed with gastric emptying study. Order placed. Advised the hospital will reach out to schedule her.  Verb understanding.

## 2025-02-12 RX ORDER — ALLOPURINOL 100 MG/1
TABLET ORAL
Qty: 90 TABLET | Refills: 1 | Status: SHIPPED | OUTPATIENT
Start: 2025-02-12

## 2025-02-12 RX ORDER — PANTOPRAZOLE SODIUM 40 MG/1
40 TABLET, DELAYED RELEASE ORAL DAILY
Qty: 90 TABLET | Refills: 3 | Status: SHIPPED | OUTPATIENT
Start: 2025-02-12

## 2025-03-11 ENCOUNTER — HOSPITAL ENCOUNTER (OUTPATIENT)
Dept: NUCLEAR MEDICINE | Facility: HOSPITAL | Age: 65
Discharge: HOME | End: 2025-03-11
Payer: MEDICARE

## 2025-03-11 PROCEDURE — 34310000005 TECHNETIUM SULFUR COLLOID: Performed by: INTERNAL MEDICINE

## 2025-03-11 PROCEDURE — 78264 GASTRIC EMPTYING IMG STUDY: CPT

## 2025-03-11 PROCEDURE — A9541 TC99M SULFUR COLLOID: HCPCS | Performed by: INTERNAL MEDICINE

## 2025-03-11 RX ADMIN — TECHNETIUM TC 99M SULFUR COLLOID 1 DOSE: KIT at 08:20

## 2025-04-21 RX ORDER — LEVOTHYROXINE SODIUM 75 UG/1
75 TABLET ORAL DAILY
Qty: 90 TABLET | Refills: 0 | Status: SHIPPED | OUTPATIENT
Start: 2025-04-21

## 2025-04-30 ENCOUNTER — OFFICE VISIT (OUTPATIENT)
Dept: FAMILY MEDICINE CLINIC | Facility: CLINIC | Age: 65
End: 2025-04-30
Payer: MEDICARE

## 2025-04-30 VITALS
BODY MASS INDEX: 45.27 KG/M2 | TEMPERATURE: 98 F | OXYGEN SATURATION: 100 % | DIASTOLIC BLOOD PRESSURE: 86 MMHG | HEIGHT: 62 IN | SYSTOLIC BLOOD PRESSURE: 142 MMHG | HEART RATE: 82 BPM | WEIGHT: 246 LBS

## 2025-04-30 DIAGNOSIS — R00.2 PALPITATION: Primary | ICD-10-CM

## 2025-04-30 DIAGNOSIS — E11.8 CONTROLLED TYPE 2 DIABETES MELLITUS WITH COMPLICATION, WITHOUT LONG-TERM CURRENT USE OF INSULIN: ICD-10-CM

## 2025-04-30 DIAGNOSIS — I10 PRIMARY HYPERTENSION: ICD-10-CM

## 2025-04-30 DIAGNOSIS — E78.00 PURE HYPERCHOLESTEROLEMIA: ICD-10-CM

## 2025-04-30 DIAGNOSIS — J30.89 ENVIRONMENTAL AND SEASONAL ALLERGIES: ICD-10-CM

## 2025-04-30 DIAGNOSIS — E03.9 HYPOTHYROIDISM, UNSPECIFIED TYPE: ICD-10-CM

## 2025-04-30 NOTE — PROGRESS NOTES
"Chief Complaint  Diabetes (FOLLOW UP/fasting)    Subjective        Laura Cerda presents to [facility]  History of Present Illness  Patient presents office today for follow-up on diabetes mellitus.  With diabetes taking Jardiance.  Patient is here fasting today.  Blood pressure 142/86.  Patient is reporting intermittent episodes of palpitations.  Last EKG last year and was normal.  She denies active chest pain or shortness of air.  With hyperlipidemia continue to work on healthy diet and exercise.  Hypothyroidism continue with thyroxine 75 mcg daily.  With seasonal allergies stable over-the-counter meds as needed.      The 10-year ASCVD risk score (Nohemi WISE, et al., 2019) is: 13.1%    Values used to calculate the score:      Age: 65 years      Sex: Female      Is Non- : No      Diabetic: Yes      Tobacco smoker: No      Systolic Blood Pressure: 142 mmHg      Is BP treated: No      HDL Cholesterol: 60 mg/dL      Total Cholesterol: 182 mg/dL           Diabetes      Objective   Vital Signs:  /86 (BP Location: Left arm, Patient Position: Sitting, Cuff Size: Large Adult)   Pulse 82   Temp 98 °F (36.7 °C)   Ht 157.5 cm (62.01\")   Wt 112 kg (246 lb)   SpO2 100%   BMI 44.98 kg/m²   Estimated body mass index is 44.98 kg/m² as calculated from the following:    Height as of this encounter: 157.5 cm (62.01\").    Weight as of this encounter: 112 kg (246 lb).            Physical Exam  Constitutional:       General: She is not in acute distress.     Appearance: Normal appearance.   HENT:      Head: Normocephalic.   Eyes:      Pupils: Pupils are equal, round, and reactive to light.   Cardiovascular:      Rate and Rhythm: Normal rate.      Pulses: Normal pulses.      Heart sounds: Normal heart sounds.   Pulmonary:      Effort: Pulmonary effort is normal.      Breath sounds: Normal breath sounds.   Musculoskeletal:         General: Normal range of motion.      " Cervical back: Normal range of motion and neck supple.   Skin:     General: Skin is warm.   Neurological:      General: No focal deficit present.      Mental Status: She is alert and oriented to person, place, and time.   Psychiatric:         Mood and Affect: Mood normal.         Behavior: Behavior normal.         Thought Content: Thought content normal.         Judgment: Judgment normal.        Result Review :  The following data was reviewed by: JYOTI Harrington on 04/30/2025:  Common labs          9/9/2024    11:10 12/9/2024    10:28 4/30/2025    09:54   Common Labs   Glucose  95  106    BUN  13  12    Creatinine  0.92  0.91    Sodium  142  141    Potassium  4.2  4.4    Chloride  102  101    Calcium  9.4  9.7    Albumin  4.1  4.4    Total Bilirubin  0.5  0.3    Alkaline Phosphatase 152  133  149    AST (SGOT)  25  26    ALT (SGPT)  23  23    WBC 8.9  8.3  8.74    Hemoglobin 14.6  13.1  13.7    Hematocrit 46.6  41.8  43.0    Platelets 211  210  207    Total Cholesterol  182  203    Triglycerides  118  169    HDL Cholesterol  60  60    LDL Cholesterol   101  114    Hemoglobin A1C  6.4  6.40    Microalbumin, Urine   3.2      Data reviewed : Cardiology studies EKG       ECG 12 Lead    Date/Time: 4/30/2025 9:36 AM  Performed by: Victorina Ennis APRN    Authorized by: Victorina Ennis APRN  Comparison: compared with previous ECG from 1/30/2024  Similar to previous ECG  Rhythm: sinus rhythm  BPM: 67  Conduction: conduction normal  ST Segments: ST segments normal  T Waves: T waves normal  QRS axis: normal  Other: no other findings    Clinical impression: normal ECG            Assessment and Plan   Diagnoses and all orders for this visit:    1. Palpitation (Primary)  -     ECG 12 Lead  -     CBC & Differential    2. Primary hypertension  -     Comprehensive Metabolic Panel    3. Pure hypercholesterolemia  -     Lipid Panel    4. Hypothyroidism, unspecified type  -     TSH    5. Controlled type 2 diabetes  mellitus with complication, without long-term current use of insulin  -     Hemoglobin A1c  -     Microalbumin / Creatinine Urine Ratio - Urine, Clean Catch    6. Environmental and seasonal allergies  -     CBC & Differential      No EKG changes of acute ischemia, no clinical evidence of acute MI, responsibility rests with patient to return for follow up for any CP/SOA, go to ER for any further signs or symptoms       I spent 30 minutes caring for Laura on this date of service. This time includes time spent by me in the following activities:preparing for the visit, reviewing tests, obtaining and/or reviewing a separately obtained history, performing a medically appropriate examination and/or evaluation , counseling and educating the patient/family/caregiver, ordering medications, tests, or procedures, documenting information in the medical record, independently interpreting results and communicating that information with the patient/family/caregiver, and care coordination  Follow Up   Return in about 12 weeks (around 7/23/2025) for Medicare Wellness.  Patient was given instructions and counseling regarding her condition or for health maintenance advice. Please see specific information pulled into the AVS if appropriate.

## 2025-05-01 LAB
ALBUMIN SERPL-MCNC: 4.4 G/DL (ref 3.5–5.2)
ALBUMIN/CREAT UR: 4 MG/G CREAT (ref 0–29)
ALBUMIN/GLOB SERPL: 1.3 G/DL
ALP SERPL-CCNC: 149 U/L (ref 39–117)
ALT SERPL-CCNC: 23 U/L (ref 1–33)
AST SERPL-CCNC: 26 U/L (ref 1–32)
BASOPHILS # BLD AUTO: 0.08 10*3/MM3 (ref 0–0.2)
BASOPHILS NFR BLD AUTO: 0.9 % (ref 0–1.5)
BILIRUB SERPL-MCNC: 0.3 MG/DL (ref 0–1.2)
BUN SERPL-MCNC: 12 MG/DL (ref 8–23)
BUN/CREAT SERPL: 13.2 (ref 7–25)
CALCIUM SERPL-MCNC: 9.7 MG/DL (ref 8.6–10.5)
CHLORIDE SERPL-SCNC: 101 MMOL/L (ref 98–107)
CHOLEST SERPL-MCNC: 203 MG/DL (ref 0–200)
CO2 SERPL-SCNC: 29.7 MMOL/L (ref 22–29)
CREAT SERPL-MCNC: 0.91 MG/DL (ref 0.57–1)
CREAT UR-MCNC: 85.7 MG/DL
EGFRCR SERPLBLD CKD-EPI 2021: 69.7 ML/MIN/1.73
EOSINOPHIL # BLD AUTO: 0.37 10*3/MM3 (ref 0–0.4)
EOSINOPHIL NFR BLD AUTO: 4.2 % (ref 0.3–6.2)
ERYTHROCYTE [DISTWIDTH] IN BLOOD BY AUTOMATED COUNT: 14.9 % (ref 12.3–15.4)
GLOBULIN SER CALC-MCNC: 3.3 GM/DL
GLUCOSE SERPL-MCNC: 106 MG/DL (ref 65–99)
HBA1C MFR BLD: 6.4 % (ref 4.8–5.6)
HCT VFR BLD AUTO: 43 % (ref 34–46.6)
HDLC SERPL-MCNC: 60 MG/DL (ref 40–60)
HGB BLD-MCNC: 13.7 G/DL (ref 12–15.9)
IMM GRANULOCYTES # BLD AUTO: 0.02 10*3/MM3 (ref 0–0.05)
IMM GRANULOCYTES NFR BLD AUTO: 0.2 % (ref 0–0.5)
LDLC SERPL CALC-MCNC: 114 MG/DL (ref 0–100)
LYMPHOCYTES # BLD AUTO: 2.4 10*3/MM3 (ref 0.7–3.1)
LYMPHOCYTES NFR BLD AUTO: 27.5 % (ref 19.6–45.3)
MCH RBC QN AUTO: 26.9 PG (ref 26.6–33)
MCHC RBC AUTO-ENTMCNC: 31.9 G/DL (ref 31.5–35.7)
MCV RBC AUTO: 84.5 FL (ref 79–97)
MICROALBUMIN UR-MCNC: 3.2 UG/ML
MONOCYTES # BLD AUTO: 0.54 10*3/MM3 (ref 0.1–0.9)
MONOCYTES NFR BLD AUTO: 6.2 % (ref 5–12)
NEUTROPHILS # BLD AUTO: 5.33 10*3/MM3 (ref 1.7–7)
NEUTROPHILS NFR BLD AUTO: 61 % (ref 42.7–76)
NRBC BLD AUTO-RTO: 0 /100 WBC (ref 0–0.2)
PLATELET # BLD AUTO: 207 10*3/MM3 (ref 140–450)
POTASSIUM SERPL-SCNC: 4.4 MMOL/L (ref 3.5–5.2)
PROT SERPL-MCNC: 7.7 G/DL (ref 6–8.5)
RBC # BLD AUTO: 5.09 10*6/MM3 (ref 3.77–5.28)
SODIUM SERPL-SCNC: 141 MMOL/L (ref 136–145)
TRIGL SERPL-MCNC: 169 MG/DL (ref 0–150)
TSH SERPL DL<=0.005 MIU/L-ACNC: 2.49 UIU/ML (ref 0.27–4.2)
VLDLC SERPL CALC-MCNC: 29 MG/DL (ref 5–40)
WBC # BLD AUTO: 8.74 10*3/MM3 (ref 3.4–10.8)

## 2025-05-11 DIAGNOSIS — E78.00 PURE HYPERCHOLESTEROLEMIA: ICD-10-CM

## 2025-05-12 RX ORDER — ATORVASTATIN CALCIUM 20 MG/1
20 TABLET, FILM COATED ORAL
Qty: 90 TABLET | Refills: 1 | Status: SHIPPED | OUTPATIENT
Start: 2025-05-12

## 2025-05-13 PROBLEM — R00.2 PALPITATION: Status: ACTIVE | Noted: 2025-05-13

## 2025-05-21 ENCOUNTER — TELEPHONE (OUTPATIENT)
Dept: OBSTETRICS AND GYNECOLOGY | Age: 65
End: 2025-05-21

## 2025-05-21 DIAGNOSIS — Z12.31 SCREENING MAMMOGRAM FOR BREAST CANCER: Primary | ICD-10-CM

## 2025-05-21 NOTE — TELEPHONE ENCOUNTER
Caller: Laura Cerda    Relationship: Self    Best call back number: [phone]    What was the call regarding: PT NEEDS TO SCHEDULE HER MAMMOGRAM

## 2025-05-22 ENCOUNTER — HOSPITAL ENCOUNTER (OUTPATIENT)
Facility: HOSPITAL | Age: 65
Discharge: HOME | End: 2025-05-22
Admitting: PHYSICIAN ASSISTANT
Payer: MEDICARE

## 2025-05-22 DIAGNOSIS — Z12.31 SCREENING MAMMOGRAM FOR BREAST CANCER: ICD-10-CM

## 2025-05-22 PROCEDURE — 77063 BREAST TOMOSYNTHESIS BI: CPT

## 2025-05-22 PROCEDURE — 77067 SCR MAMMO BI INCL CAD: CPT

## 2025-07-17 RX ORDER — LEVOTHYROXINE SODIUM 75 UG/1
75 TABLET ORAL DAILY
Qty: 90 TABLET | Refills: 0 | Status: SHIPPED | OUTPATIENT
Start: 2025-07-17

## 2025-07-23 ENCOUNTER — OFFICE VISIT (OUTPATIENT)
Dept: FAMILY MEDICINE CLINIC | Facility: CLINIC | Age: 65
End: 2025-07-23
Payer: MEDICARE

## 2025-07-23 VITALS
DIASTOLIC BLOOD PRESSURE: 88 MMHG | SYSTOLIC BLOOD PRESSURE: 140 MMHG | OXYGEN SATURATION: 100 % | TEMPERATURE: 98.4 F | WEIGHT: 245.8 LBS | HEIGHT: 62 IN | HEART RATE: 67 BPM | BODY MASS INDEX: 45.23 KG/M2

## 2025-07-23 DIAGNOSIS — L40.59 POLYARTICULAR PSORIATIC ARTHRITIS: ICD-10-CM

## 2025-07-23 DIAGNOSIS — J30.89 ENVIRONMENTAL AND SEASONAL ALLERGIES: ICD-10-CM

## 2025-07-23 DIAGNOSIS — K21.9 GASTROESOPHAGEAL REFLUX DISEASE WITHOUT ESOPHAGITIS: ICD-10-CM

## 2025-07-23 DIAGNOSIS — E03.9 HYPOTHYROIDISM, UNSPECIFIED TYPE: ICD-10-CM

## 2025-07-23 DIAGNOSIS — E11.8 CONTROLLED TYPE 2 DIABETES MELLITUS WITH COMPLICATION, WITHOUT LONG-TERM CURRENT USE OF INSULIN: ICD-10-CM

## 2025-07-23 DIAGNOSIS — I10 PRIMARY HYPERTENSION: ICD-10-CM

## 2025-07-23 DIAGNOSIS — Z00.00 MEDICARE ANNUAL WELLNESS VISIT, SUBSEQUENT: Primary | ICD-10-CM

## 2025-07-23 PROCEDURE — 1160F RVW MEDS BY RX/DR IN RCRD: CPT | Performed by: NURSE PRACTITIONER

## 2025-07-23 PROCEDURE — G0439 PPPS, SUBSEQ VISIT: HCPCS | Performed by: NURSE PRACTITIONER

## 2025-07-23 PROCEDURE — 3079F DIAST BP 80-89 MM HG: CPT | Performed by: NURSE PRACTITIONER

## 2025-07-23 PROCEDURE — 3044F HG A1C LEVEL LT 7.0%: CPT | Performed by: NURSE PRACTITIONER

## 2025-07-23 PROCEDURE — 1125F AMNT PAIN NOTED PAIN PRSNT: CPT | Performed by: NURSE PRACTITIONER

## 2025-07-23 PROCEDURE — 1170F FXNL STATUS ASSESSED: CPT | Performed by: NURSE PRACTITIONER

## 2025-07-23 PROCEDURE — 3077F SYST BP >= 140 MM HG: CPT | Performed by: NURSE PRACTITIONER

## 2025-07-23 PROCEDURE — 1159F MED LIST DOCD IN RCRD: CPT | Performed by: NURSE PRACTITIONER

## 2025-07-23 NOTE — PROGRESS NOTES
Subjective   The ABCs of the Annual Wellness Visit  Medicare Wellness Visit      Laura Cerda is a 65 y.o. patient who presents for a Medicare Wellness Visit.    The following portions of the patient's history were reviewed and   updated as appropriate: allergies, current medications, past family history, past medical history, past social history, past surgical history, and problem list.    Compared to one year ago, the patient's physical   health is the same.  Compared to one year ago, the patient's mental   health is the same.    Recent Hospitalizations:  She was not admitted to the hospital during the last year.     Current Medical Providers:  Patient Care Team:  Victorina Ennis APRN as PCP - General (Nurse Practitioner)  Hanna Contreras MD as Consulting Physician (Rheumatology)  Joanne Monteiro PA as Physician Assistant (Obstetrics and Gynecology)  Dottie Orantes MD as Gynecologist (Gynecology)  Reji Rodríguez MD as Consulting Physician (Gastroenterology)    Outpatient Medications Prior to Visit   Medication Sig Dispense Refill    acetaminophen (TYLENOL) 500 MG tablet Take 1 tablet by mouth As Needed for Mild Pain.      allopurinol (ZYLOPRIM) 100 MG tablet TAKE 1 TABLET BY MOUTH EVERY DAY 90 tablet 1    atorvastatin (LIPITOR) 20 MG tablet TAKE 1 TABLET BY MOUTH EVERY DAY AT NIGHT 90 tablet 1    empagliflozin (Jardiance) 10 MG tablet tablet Take 1 tablet by mouth Daily. 90 tablet 0    fluticasone (FLONASE) 50 MCG/ACT nasal spray SPRAY 2 SPRAYS INTO THE NOSTRIL AS DIRECTED BY PROVIDER DAILY. (Patient taking differently: As Needed.) 16 mL 1    levothyroxine (SYNTHROID, LEVOTHROID) 75 MCG tablet TAKE 1 TABLET BY MOUTH EVERY DAY 90 tablet 0    Multiple Vitamins-Minerals (MULTIVITAMIN ADULT PO) Take 1 tablet by mouth Daily.      nabumetone (Relafen) 750 MG tablet Take 1 tablet by mouth Daily.      NON FORMULARY Gabapentin, Ibuprofen, Lidocaine compound cream      pantoprazole (PROTONIX) 40 MG EC  tablet TAKE 1 TABLET BY MOUTH EVERY DAY 90 tablet 3    Secukinumab (COSENTYX IV) Infuse  into a venous catheter Every 30 (Thirty) Days.      sertraline (ZOLOFT) 50 MG tablet TAKE 1 TABLET BY MOUTH EVERY DAY IN THE EVENING 90 tablet 1    tiZANidine (ZANAFLEX) 2 MG tablet Take 1 tablet by mouth Every Night.      vitamin D (ERGOCALCIFEROL) 1.25 MG (40527 UT) capsule capsule 1 po qweek 24 capsule 1     No facility-administered medications prior to visit.     No opioid medication identified on active medication list. I have reviewed chart for other potential  high risk medication/s and harmful drug interactions in the elderly.      Aspirin is not on active medication list.  Aspirin use is not indicated based on review of current medical condition/s. Risk of harm outweighs potential benefits.  .    Patient Active Problem List   Diagnosis    Fibromyalgia    Hypothyroid    Hyperlipidemia    Meniere's disease    Class 3 severe obesity due to excess calories without serious comorbidity with body mass index (BMI) of 40.0 to 44.9 in adult    Polyarticular psoriatic arthritis    GERD (gastroesophageal reflux disease)    Hypertension    Elevated PTHrP level    Vitamin D deficiency    Hyperglycemia    Other fatigue    Primary hyperparathyroidism    Constipation    Dysphagia    Diarrhea    Polyp of colon    Ulcer of esophagus without bleeding    Prediabetes    Controlled type 2 diabetes mellitus with complication, without long-term current use of insulin    Hyperuricemia    Low back pain    Chronic gout of multiple sites    Diverticulosis    COVID-19 virus detected    Fatty liver    Diverticulitis    Hospital discharge follow-up    Acute pain of left shoulder    Anemia    History of colon polyps    Medicare annual wellness visit, subsequent    Acute cough    Sore throat    Acute URI    Environmental and seasonal allergies    Dyspnea    Pulmonary nodule    Large breasts    Neck pain    Leukocytosis    Elevated alkaline phosphatase  "level    Narrowing of lumbar intervertebral disc space    Epigastric pain    Nausea    Immunization due    History of immunosuppression therapy    Palpitation     Advance Care Planning Advance Directive is not on file.  ACP discussion was held with the patient during this visit. Patient has an advance directive (not in EMR), copy requested.            Objective   Vitals:    25 0832   BP: 140/88   BP Location: Left arm   Patient Position: Sitting   Cuff Size: Large Adult   Pulse: 67   Temp: 98.4 °F (36.9 °C)   SpO2: 100%   Weight: 111 kg (245 lb 12.8 oz)   Height: 157.5 cm (62.01\")       Estimated body mass index is 44.95 kg/m² as calculated from the following:    Height as of this encounter: 157.5 cm (62.01\").    Weight as of this encounter: 111 kg (245 lb 12.8 oz).                Does the patient have evidence of cognitive impairment? No  Lab Results   Component Value Date    CHLPL 203 (H) 2025    TRIG 169 (H) 2025    HDL 60 2025     (H) 2025    VLDL 29 2025    HGBA1C 6.40 (H) 2025                                                                                               Health  Risk Assessment    Smoking Status:  Social History     Tobacco Use   Smoking Status Never    Passive exposure: Never   Smokeless Tobacco Never     Alcohol Consumption:  Social History     Substance and Sexual Activity   Alcohol Use Yes    Comment: once a year       Fall Risk Screen  STEADI Fall Risk Assessment was completed, and patient is at LOW risk for falls.Assessment completed on:2025    Depression Screening   Little interest or pleasure in doing things? Not at all   Feeling down, depressed, or hopeless? Not at all   PHQ-2 Total Score 0      Health Habits and Functional and Cognitive Screenin/22/2025    10:30 AM   Functional & Cognitive Status   Do you have difficulty preparing food and eating? No   Do you have difficulty bathing yourself, getting dressed or grooming " yourself? No   Do you have difficulty using the toilet? No   Do you have difficulty moving around from place to place? No   Do you have trouble with steps or getting out of a bed or a chair? No   Current Diet Limited Junk Food   Dental Exam Up to date   Eye Exam Up to date   Exercise (times per week) 3 times per week   Current Exercises Include Gardening;House Cleaning;Stationary Bicycling/Spin Class;Yard Work   Do you need help using the phone?  No   Are you deaf or do you have serious difficulty hearing?  No   Do you need help to go to places out of walking distance? No   Do you need help shopping? No   Do you need help preparing meals?  No   Do you need help with housework?  No   Do you need help with laundry? No   Do you need help taking your medications? No   Do you need help managing money? No   Do you ever drive or ride in a car without wearing a seat belt? No   Have you felt unusual fatigue (could be tiredness), stress, anger or loneliness in the last month? No   Who do you live with? Spouse   If you need help, do you have trouble finding someone available to you? No   Have you been bothered in the last four weeks by sexual problems? No   Do you have difficulty concentrating, remembering or making decisions? No           Age-appropriate Screening Schedule:  Refer to the list below for future screening recommendations based on patient's age, sex and/or medical conditions. Orders for these recommended tests are listed in the plan section. The patient has been provided with a written plan.    Health Maintenance List  Health Maintenance   Topic Date Due    DIABETIC FOOT EXAM  Never done    TDAP/TD VACCINES (1 - Tdap) Never done    ZOSTER VACCINE (1 of 2) Never done    COVID-19 Vaccine (1) 01/23/2026 (Originally 1/24/1965)    INFLUENZA VACCINE  10/01/2025    HEMOGLOBIN A1C  10/30/2025    DIABETIC EYE EXAM  03/01/2026    DXA SCAN  03/15/2026    LIPID PANEL  04/30/2026    URINE MICROALBUMIN-CREATININE RATIO (uACR)   04/30/2026    ANNUAL WELLNESS VISIT  07/23/2026    MAMMOGRAM  05/22/2027    COLORECTAL CANCER SCREENING  05/11/2033    HEPATITIS C SCREENING  Completed    Pneumococcal Vaccine 50+  Completed                                                                                                                                                CMS Preventative Services Quick Reference  Risk Factors Identified During Encounter  Immunizations Discussed/Encouraged: Pneumococcal 23, Prevnar 20 (Pneumococcal 20-valent conjugate), Shingrix, COVID19, and RSV (Respiratory Syncytial Virus)  Dental Screening Recommended  Vision Screening Recommended    The above risks/problems have been discussed with the patient.  Pertinent information has been shared with the patient in the After Visit Summary.  An After Visit Summary and PPPS were made available to the patient.    Follow Up:   Next Medicare Wellness visit to be scheduled in 1 year.     Assessment & Plan  Medicare annual wellness visit, subsequent  Counseling was provided on nutrition, physical activity, development, and injury prevention, dental health, and safe sex practices patient verbalizes understanding no additional questions were asked.         Environmental and seasonal allergies  Stable, over-the-counter medication as needed.         Primary hypertension  Hypertension is stable and controlled  Continue current treatment regimen.  Blood pressure will be reassessed in 6 months.         Controlled type 2 diabetes mellitus with complication, without long-term current use of insulin  Diabetes is stable.   Continue current treatment regimen.  Diabetes will be reassessed in 6 months         Hypothyroidism, unspecified type  Stable with levothryoxine         Gastroesophageal reflux disease without esophagitis  Stable with Protonix follows gastroenterologist.         Polyarticular psoriatic arthritis  Stable follows rheumatologist              Follow Up:   Return in about 4 months  (around 12/5/2025) for Recheck.

## 2025-07-29 RX ORDER — EMPAGLIFLOZIN 10 MG/1
10 TABLET, FILM COATED ORAL DAILY
Qty: 90 TABLET | Refills: 1 | Status: SHIPPED | OUTPATIENT
Start: 2025-07-29

## 2025-08-07 RX ORDER — ALLOPURINOL 100 MG/1
100 TABLET ORAL DAILY
Qty: 90 TABLET | Refills: 1 | Status: SHIPPED | OUTPATIENT
Start: 2025-08-07

## (undated) DEVICE — KT ORCA ORCAPOD DISP STRL

## (undated) DEVICE — DEV COND GAS LAP INSUFLOW W/LUER CONN

## (undated) DEVICE — SUT SILK 2/0 TIES 18IN A185H

## (undated) DEVICE — TUBING, SUCTION, 1/4" X 10', STRAIGHT: Brand: MEDLINE

## (undated) DEVICE — THE TORRENT IRRIGATION SCOPE CONNECTOR IS USED WITH THE TORRENT IRRIGATION TUBING TO PROVIDE IRRIGATION FLUIDS SUCH AS STERILE WATER DURING GASTROINTESTINAL ENDOSCOPIC PROCEDURES WHEN USED IN CONJUNCTION WITH AN IRRIGATION PUMP (OR ELECTROSURGICAL UNIT).: Brand: TORRENT

## (undated) DEVICE — SUT SILK 2/0 SH CR8 18IN CR8 C012D

## (undated) DEVICE — HYBRID TUBING/CAP SET FOR OLYMPUS® SCOPES: Brand: ERBE

## (undated) DEVICE — JACKSON-PRATT 100CC BULB RESERVOIR: Brand: CARDINAL HEALTH

## (undated) DEVICE — DRSNG WND BORDR/ADHS NONADHR/GZ LF 4X4IN STRL

## (undated) DEVICE — SAFELINER SUCTION CANISTER 1000CC: Brand: DEROYAL

## (undated) DEVICE — SKIN PREP TRAY W/CHG: Brand: MEDLINE INDUSTRIES, INC.

## (undated) DEVICE — WOUND RETRACTOR AND PROTECTOR: Brand: ALEXIS WOUND PROTECTOR-RETRACTOR

## (undated) DEVICE — FRCP BX RADJAW4 NDL 2.8 240CM LG OG BX40

## (undated) DEVICE — SUT VIC 5/0 PS2 18IN J495H

## (undated) DEVICE — ELECTRD BLD EZ CLN MOD XLNG 2.75IN

## (undated) DEVICE — CANN O2 ETCO2 FITS ALL CONN CO2 SMPL A/ 7IN DISP LF

## (undated) DEVICE — SUT PROLN 2/0 SH 36IN 8523H

## (undated) DEVICE — ENDOPATH XCEL UNIVERSAL TROCAR STABLILITY SLEEVES: Brand: ENDOPATH XCEL

## (undated) DEVICE — BITEBLOCK OMNI BLOC

## (undated) DEVICE — DRSNG WND BORDR/ADHS NONADHR/GZ LF 2X2IN STRL

## (undated) DEVICE — ENDOPATH XCEL BLADELESS TROCARS WITH STABILITY SLEEVES: Brand: ENDOPATH XCEL

## (undated) DEVICE — CANN NASL CO2 TRULINK W/O2 A/

## (undated) DEVICE — SUT SILK 3/0 TIES 18IN A184H

## (undated) DEVICE — Device: Brand: DEFENDO AIR/WATER/SUCTION AND BIOPSY VALVE

## (undated) DEVICE — SENSR O2 OXIMAX FNGR A/ 18IN NONSTR

## (undated) DEVICE — ADAPT CLN BIOGUARD AIR/H2O DISP

## (undated) DEVICE — Device

## (undated) DEVICE — TBG PENCL TELESCP MEGADYNE SMOKE EVAC 10FT

## (undated) DEVICE — ENDOCUT SCISSOR TIP, DISPOSABLE: Brand: RENEW

## (undated) DEVICE — VISUALIZATION SYSTEM: Brand: CLEARIFY

## (undated) DEVICE — DISPOSABLE GRASPER CARTRIDGE: Brand: DIRECT DRIVE REPOSABLE GRASPERS

## (undated) DEVICE — GLV SURG PREMIERPRO ORTHO LTX PF SZ7.5 BRN

## (undated) DEVICE — ENDOPATH PNEUMONEEDLE INSUFFLATION NEEDLES WITH LUER LOCK CONNECTORS 120MM: Brand: ENDOPATH

## (undated) DEVICE — BLCK/BITE BLOX W/DENTL/RIM W/STRAP 54F

## (undated) DEVICE — GOWN ,SIRUS,NONREINFORCED SMALL: Brand: MEDLINE

## (undated) DEVICE — LOU LAP SIGMOID COLON: Brand: MEDLINE INDUSTRIES, INC.

## (undated) DEVICE — CLIP LIGAT VASC HORIZON TI LG ORNG 6CT: Type: IMPLANTABLE DEVICE | Site: ABDOMEN | Status: NON-FUNCTIONAL

## (undated) DEVICE — ECHELON FLEX 60 ARTICULATING ENDOSCOPIC LINEAR CUTTER (NO CARTRIDGE): Brand: ECHELON FLEX ENDOPATH

## (undated) DEVICE — LAPAROSCOPIC SMOKE ELIMINATION DEVICE: Brand: PNEUVIEW XE

## (undated) DEVICE — SOL NACL 0.9PCT 1000ML

## (undated) DEVICE — TOTAL TRAY, 16FR 10ML SIL FOLEY, URN: Brand: MEDLINE

## (undated) DEVICE — VIAL FORMALIN CAP 10P 40ML

## (undated) DEVICE — SUT PDS 0 CT1 36IN Z346H

## (undated) DEVICE — TRAP FLD MINIVAC MEGADYNE 100ML

## (undated) DEVICE — SUT VIC 0 TN 27IN DYED JTN0G

## (undated) DEVICE — HARMONIC ACE +7 LAPAROSCOPIC SHEARS ADVANCED HEMOSTASIS 5MM DIAMETER 36CM SHAFT LENGTH  FOR USE WITH GRAY HAND PIECE ONLY: Brand: HARMONIC ACE

## (undated) DEVICE — BW-412T DISP COMBO CLEANING BRUSH: Brand: SINGLE USE COMBINATION CLEANING BRUSH

## (undated) DEVICE — PATIENT RETURN ELECTRODE, SINGLE-USE, CONTACT QUALITY MONITORING, ADULT, WITH 9FT CORD, FOR PATIENTS WEIGING OVER 33LBS. (15KG): Brand: MEGADYNE

## (undated) DEVICE — SUT SILK 0 TIES 18IN SA66G

## (undated) DEVICE — SOL IRR H2O BTL 1000ML STRL

## (undated) DEVICE — ENDOPOUCH RETRIEVER SPECIMEN RETRIEVAL BAGS: Brand: ENDOPOUCH RETRIEVER

## (undated) DEVICE — LN SMPL CO2 SHTRM SD STREAM W/M LUER

## (undated) DEVICE — GLV SURG BIOGEL LTX PF 6